# Patient Record
Sex: FEMALE | Race: WHITE | NOT HISPANIC OR LATINO | Employment: OTHER | URBAN - NONMETROPOLITAN AREA
[De-identification: names, ages, dates, MRNs, and addresses within clinical notes are randomized per-mention and may not be internally consistent; named-entity substitution may affect disease eponyms.]

---

## 2017-02-22 ENCOUNTER — AMBULATORY - GICH (OUTPATIENT)
Dept: LAB | Facility: OTHER | Age: 77
End: 2017-02-22

## 2017-02-22 DIAGNOSIS — Z79.899 OTHER LONG TERM (CURRENT) DRUG THERAPY: ICD-10-CM

## 2017-03-02 LAB — Lab: 75 NG/ML

## 2017-10-06 ENCOUNTER — AMBULATORY - GICH (OUTPATIENT)
Dept: LAB | Facility: OTHER | Age: 77
End: 2017-10-06

## 2017-10-06 ENCOUNTER — AMBULATORY - GICH (OUTPATIENT)
Dept: FAMILY MEDICINE | Facility: OTHER | Age: 77
End: 2017-10-06

## 2017-10-06 DIAGNOSIS — Z79.899 OTHER LONG TERM (CURRENT) DRUG THERAPY: ICD-10-CM

## 2017-10-06 DIAGNOSIS — Z23 ENCOUNTER FOR IMMUNIZATION: ICD-10-CM

## 2017-10-11 LAB — Lab: 67 NG/ML

## 2018-01-06 ENCOUNTER — COMMUNICATION - GICH (OUTPATIENT)
Dept: FAMILY MEDICINE | Facility: OTHER | Age: 78
End: 2018-01-06

## 2018-01-06 DIAGNOSIS — E03.9 HYPOTHYROIDISM: ICD-10-CM

## 2018-01-19 ENCOUNTER — COMMUNICATION - GICH (OUTPATIENT)
Dept: FAMILY MEDICINE | Facility: OTHER | Age: 78
End: 2018-01-19

## 2018-01-19 DIAGNOSIS — E78.5 HYPERLIPIDEMIA: ICD-10-CM

## 2018-01-24 ENCOUNTER — DOCUMENTATION ONLY (OUTPATIENT)
Dept: FAMILY MEDICINE | Facility: OTHER | Age: 78
End: 2018-01-24

## 2018-01-24 PROBLEM — E03.9 HYPOTHYROIDISM: Status: ACTIVE | Noted: 2018-01-24

## 2018-01-24 PROBLEM — J30.9 ALLERGIC RHINITIS: Status: ACTIVE | Noted: 2018-01-24

## 2018-01-24 PROBLEM — N95.2 VAGINITIS, ATROPHIC: Status: ACTIVE | Noted: 2018-01-24

## 2018-01-24 PROBLEM — F34.1 DYSTHYMIA: Status: ACTIVE | Noted: 2018-01-24

## 2018-01-24 PROBLEM — M89.9 DISORDER OF BONE AND CARTILAGE: Status: ACTIVE | Noted: 2018-01-24

## 2018-01-24 PROBLEM — M94.9 DISORDER OF BONE AND CARTILAGE: Status: ACTIVE | Noted: 2018-01-24

## 2018-01-24 PROBLEM — N95.1 SYMPTOMATIC MENOPAUSAL OR FEMALE CLIMACTERIC STATES: Status: ACTIVE | Noted: 2018-01-24

## 2018-01-24 PROBLEM — R73.09 ABNORMAL GLUCOSE: Status: ACTIVE | Noted: 2018-01-24

## 2018-01-24 PROBLEM — H91.90 HEARING LOSS: Status: ACTIVE | Noted: 2018-01-24

## 2018-01-24 PROBLEM — H34.9 RETINAL VASCULAR OCCLUSION: Status: ACTIVE | Noted: 2018-01-24

## 2018-01-24 PROBLEM — F32.A DEPRESSION: Status: ACTIVE | Noted: 2018-01-24

## 2018-01-24 RX ORDER — LEVOTHYROXINE SODIUM 75 UG/1
1 TABLET ORAL DAILY
COMMUNITY
Start: 2018-01-08 | End: 2018-05-10

## 2018-01-24 RX ORDER — SIMVASTATIN 10 MG
1 TABLET ORAL DAILY
COMMUNITY
Start: 2016-10-27 | End: 2018-04-23

## 2018-01-24 RX ORDER — NORTRIPTYLINE HCL 25 MG
CAPSULE ORAL
COMMUNITY
End: 2019-03-11

## 2018-01-24 RX ORDER — NORTRIPTYLINE HCL 25 MG
1 CAPSULE ORAL 2 TIMES DAILY
COMMUNITY
Start: 2016-08-20

## 2018-02-12 NOTE — TELEPHONE ENCOUNTER
Patient Information     Patient Name MRN Sex Princess Lizarraga [id number] Female 1940      Telephone Encounter by Mone Joy RN at 2018  2:49 PM     Author:  Mone Joy RN Service:  (none) Author Type:  NURS- Registered Nurse     Filed:  2018  2:52 PM Encounter Date:  2018 Status:  Signed     :  Mone Joy RN (NURS- Registered Nurse)            Hypothyroidism  Office visit in the past 12 months or per provider note.  Last visit with CADEN LEE was on: 10/27/2016 in [facility]  Next visit with CADEN LEE is on: No future appointment listed with this provider  Next visit with Family Practice is on: No future appointment listed in this department    Lab testing requirements:  TSH annually  TSH (uIU/mL)    Date Value   10/31/2016 1.60   Max refill for 12 months from last office visit or per provider note.  Due for exam.  Limited refill per protocol and letter mailed.  Mone Joy RN ........   2018    2:50 PM

## 2018-02-13 NOTE — TELEPHONE ENCOUNTER
Patient Information     Patient Name MRN Princess Glass [id number] Female 1940      Telephone Encounter by Lew Kingsley RN at 2018  3:43 PM     Author:  Lew Kingsley RN Service:  (none) Author Type:  NURS- Registered Nurse     Filed:  2018  3:50 PM Encounter Date:  2018 Status:  Signed     :  Lew Kingsley RN (NURS- Registered Nurse)            Statins    Office visit in the past 12 months.    Last visit with CADEN LEE was on: 10/27/2016 in [facility]  Next visit with CADEN LEE is on: No future appointment listed with this provider  Next visit with Family Practice is on: No future appointment listed in this department    Lab testing requirements:  Lipids annually.  Repeat lipids 6-8 weeks after dosage or drug change.    Last Lipids:  Chol: 160    10/31/2016  T    10/31/2016  HDL:   54    10/31/2016  LDL:  89    10/31/2016  LDL DIRECT:  No results found in past 5 years.    Max refills 12 months from last office visit.      Chart review shows that patient is overdue for an office visit with PCP. Was last seen by PCP on 10/27/16, and was sent a reminder letter/MyChart message with refill encounter dated 18. MyChart message was read by patient on 18, and no appointment is noted to be scheduled at this time. Writer will not send an additional reminder letter/MyChart message at this time, but will refill rx as requested for a limited supply.    Prescription refilled per RN Medication Refill Policy.................... Lew Kingsley RN ....................  2018   3:49 PM

## 2018-02-26 ENCOUNTER — OFFICE VISIT (OUTPATIENT)
Dept: FAMILY MEDICINE | Facility: OTHER | Age: 78
End: 2018-02-26
Attending: FAMILY MEDICINE
Payer: MEDICARE

## 2018-02-26 VITALS
WEIGHT: 171 LBS | SYSTOLIC BLOOD PRESSURE: 126 MMHG | DIASTOLIC BLOOD PRESSURE: 80 MMHG | HEART RATE: 76 BPM | BODY MASS INDEX: 31.47 KG/M2 | HEIGHT: 62 IN

## 2018-02-26 DIAGNOSIS — E55.9 VITAMIN D DEFICIENCY: ICD-10-CM

## 2018-02-26 DIAGNOSIS — Z12.31 ENCOUNTER FOR SCREENING MAMMOGRAM FOR HIGH-RISK PATIENT: ICD-10-CM

## 2018-02-26 DIAGNOSIS — E78.5 HYPERLIPIDEMIA, UNSPECIFIED HYPERLIPIDEMIA TYPE: ICD-10-CM

## 2018-02-26 DIAGNOSIS — Z82.79 FAMILY HISTORY OF BICUSPID AORTIC VALVE: ICD-10-CM

## 2018-02-26 DIAGNOSIS — E03.9 HYPOTHYROIDISM, UNSPECIFIED TYPE: Primary | ICD-10-CM

## 2018-02-26 LAB
ALBUMIN SERPL-MCNC: 4 G/DL (ref 3.5–5.7)
ALP SERPL-CCNC: 64 U/L (ref 34–104)
ALT SERPL W P-5'-P-CCNC: 18 U/L (ref 7–52)
ANION GAP SERPL CALCULATED.3IONS-SCNC: 8 MMOL/L (ref 3–14)
AST SERPL W P-5'-P-CCNC: 20 U/L (ref 13–39)
BILIRUB SERPL-MCNC: 0.5 MG/DL (ref 0.3–1)
BUN SERPL-MCNC: 16 MG/DL (ref 7–25)
CALCIUM SERPL-MCNC: 9.6 MG/DL (ref 8.6–10.3)
CHLORIDE SERPL-SCNC: 105 MMOL/L (ref 98–107)
CHOLEST SERPL-MCNC: 172 MG/DL
CO2 SERPL-SCNC: 28 MMOL/L (ref 21–31)
CREAT SERPL-MCNC: 1.16 MG/DL (ref 0.6–1.2)
DEPRECATED CALCIDIOL+CALCIFEROL SERPL-MC: 35.9 NG/ML
GFR SERPL CREATININE-BSD FRML MDRD: 45 ML/MIN/1.7M2
GLUCOSE SERPL-MCNC: 96 MG/DL (ref 70–105)
HDLC SERPL-MCNC: 51 MG/DL (ref 23–92)
LDLC SERPL CALC-MCNC: 98 MG/DL
NONHDLC SERPL-MCNC: 121 MG/DL
POTASSIUM SERPL-SCNC: 4.4 MMOL/L (ref 3.5–5.1)
PROT SERPL-MCNC: 8.1 G/DL (ref 6.4–8.9)
SODIUM SERPL-SCNC: 141 MMOL/L (ref 134–144)
TRIGL SERPL-MCNC: 115 MG/DL
TSH SERPL DL<=0.05 MIU/L-ACNC: 4.11 IU/ML (ref 0.34–5.6)

## 2018-02-26 PROCEDURE — 36415 COLL VENOUS BLD VENIPUNCTURE: CPT | Performed by: FAMILY MEDICINE

## 2018-02-26 PROCEDURE — 99213 OFFICE O/P EST LOW 20 MIN: CPT | Performed by: FAMILY MEDICINE

## 2018-02-26 PROCEDURE — 80061 LIPID PANEL: CPT | Performed by: FAMILY MEDICINE

## 2018-02-26 PROCEDURE — 84443 ASSAY THYROID STIM HORMONE: CPT | Performed by: FAMILY MEDICINE

## 2018-02-26 PROCEDURE — G0463 HOSPITAL OUTPT CLINIC VISIT: HCPCS

## 2018-02-26 PROCEDURE — 80053 COMPREHEN METABOLIC PANEL: CPT | Performed by: FAMILY MEDICINE

## 2018-02-26 PROCEDURE — 82306 VITAMIN D 25 HYDROXY: CPT | Performed by: FAMILY MEDICINE

## 2018-02-26 RX ORDER — ARIPIPRAZOLE 10 MG/1
5 TABLET ORAL DAILY
Qty: 30 TABLET | Refills: 1 | COMMUNITY
Start: 2018-02-26

## 2018-02-26 ASSESSMENT — ENCOUNTER SYMPTOMS
SHORTNESS OF BREATH: 0
FEVER: 0
UNEXPECTED WEIGHT CHANGE: 0
FATIGUE: 0

## 2018-02-26 NOTE — PROGRESS NOTES
SUBJECTIVE:   Princess Leone is a 77 year old female who presents to clinic today for the following health issues:    HPI   Her son had an operative to replace his aortic valve due to it being bicuspid.  Her son's son also has a bicuspid aortic valve, but hasn't had his replaced at all.  She had an echo in 2011 and was noted to have a trileaflet aortic valve.    She had been taking Vitamin D 5000 International Units daily for a while.  Her last supplement she picked up was a smaller dose (?1000 International Units).  She would like to have her level rechecked.    Patient Active Problem List    Diagnosis Date Noted     Allergic rhinitis 01/24/2018     Priority: Medium     Retinal vascular occlusion 01/24/2018     Priority: Medium     Overview:   Left eye subtotal central retinal vein occlusion.       Depression 01/24/2018     Priority: Medium     Dysthymia 01/24/2018     Priority: Medium     Hearing loss 01/24/2018     Priority: Medium     Overview:   Mild       Hypothyroidism 01/24/2018     Priority: Medium     Symptomatic menopausal or female climacteric states 01/24/2018     Priority: Medium     Disorder of bone and cartilage 01/24/2018     Priority: Medium     Abnormal glucose 01/24/2018     Priority: Medium     Vaginitis, atrophic 01/24/2018     Priority: Medium     CKD (chronic kidney disease) 11/02/2016     Priority: Medium     Vitamin D deficiency 03/19/2015     Priority: Medium     Cerebral lesion 05/17/2012     Priority: Medium     Intermittent atrial fibrillation (H) 05/17/2012     Priority: Medium     Other nonspecific abnormal result of function study of brain and central nervous system 04/24/2012     Priority: Medium     Cerebral artery occlusion with cerebral infarction (H) 04/06/2012     Priority: Medium     Viral upper respiratory tract infection 01/20/2012     Priority: Medium     Abnormal electrocardiogram 06/09/2011     Priority: Medium     Hyperlipidemia 05/03/2010     Priority: Medium      Carpal tunnel syndrome 2003     Priority: Medium     Past Medical History:   Diagnosis Date     Cerebral infarction (H)     12,left parietal CVA with expressive aphasia.  MRI of brain after head CT showed multiple enhancing lesions, concerning for metastatic illness.  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  Referred to ENT and oncology 12.     Hypothyroidism          Personal history of other medical treatment (CODE)     G3, P3-0-0-3 status post three NSVDs      Past Surgical History:   Procedure Laterality Date     COLONOSCOPY      approximately  in the [facility] - was normal.     OTHER SURGICAL HISTORY      .0,(IA) SURGICAL PROCEDURE,N/A     OTHER SURGICAL HISTORY      01782.0,PAST SURGICAL HISTORY,None.     Family History   Problem Relation Age of Onset     HEART DISEASE Mother      Heart Disease,D & C at 91/Heart Disease,pacemaker     Hypertension Mother      Hypertension     Other - See Comments Father       at 76.  He had peripheral vascular disease. Bypass./COPD     HEART DISEASE Brother      Heart Disease, carotid disease and has gone through stenting     Other - See Comments Brother      tinnitis     HEART DISEASE Brother      Heart Disease,CABG x 3     Breast Cancer No family hx of      Cancer-breast     Social History   Substance Use Topics     Smoking status: Never Smoker     Smokeless tobacco: Never Used     Alcohol use No     Social History     Social History Narrative    Retired from  teaching    3 grown kids and 5 grandkids    L[facility] with  in [facility]    Cafene 1    Calcium good    Water some    She is retired.  She and her  relocated to the [facility] from [place], [place].      Kirk Son, 4/10/64      Kyle    tiffanie Daughter, 67      Karen Daughter, 3/9/69       Brian -   "    Current Outpatient Prescriptions   Medication Sig Dispense Refill     ARIPiprazole (ABILIFY) 10 MG tablet Take 0.5 tablets (5 mg) by mouth daily 30 tablet 1     Aspirin Buf,CaCarb-MgCarb-MgO, (BUFFERED ASPIRIN) 325 MG TABS Take 1 tablet by mouth daily       cholecalciferol (D 5000) 5000 UNITS CAPS Take one by mouth daily.  Have lab level of vitamin d rechecked after completion of 90 days.       levothyroxine (SYNTHROID/LEVOTHROID) 75 MCG tablet Take 1 tablet by mouth daily       nortriptyline (PAMELOR) 25 MG capsule Take 1 capsule by mouth 2 times daily       nortriptyline (PAMELOR) 25 MG capsule        simvastatin (ZOCOR) 10 MG tablet Take 1 tablet by mouth daily       No Known Allergies    Review of Systems   Constitutional: Negative for fatigue, fever and unexpected weight change.   Respiratory: Negative for shortness of breath.    Cardiovascular: Negative for chest pain.   Psychiatric/Behavioral: Negative for mood changes.        OBJECTIVE:     /80 (BP Location: Right arm, Patient Position: Sitting, Cuff Size: Adult Large)  Pulse 76  Ht 5' 2\" (1.575 m)  Wt 171 lb (77.6 kg)  BMI 31.28 kg/m2  Body mass index is 31.28 kg/(m^2).  Physical Exam   Constitutional: She appears well-developed.   HENT:   Head: Normocephalic.   Eyes: Pupils are equal, round, and reactive to light.   Neck: Normal range of motion. Neck supple. No thyromegaly present.   Cardiovascular: Normal rate, regular rhythm and normal heart sounds.    No murmur heard.  Pulmonary/Chest: Effort normal and breath sounds normal. No respiratory distress. She has no wheezes. She has no rales.   Musculoskeletal: She exhibits no edema.   Lymphadenopathy:     She has no cervical adenopathy.   Psychiatric: She has a normal mood and affect.       PHQ-9 SCORE 3/19/2015 10/27/2016 2/26/2018   Total Score 0 0 0           Diagnostic Test Results:  Results for orders placed or performed in visit on 02/26/18 (from the past 24 hour(s))   Vitamin D Total GH "   Result Value Ref Range    Vitamin D Total 35.9 ng/mL   Lipid Profile (Chol, Trig, HDL, LDL calc) - NON FASTING   Result Value Ref Range    Cholesterol 172 <200 mg/dL    Triglycerides 115 <150 mg/dL    HDL Cholesterol 51 23 - 92 mg/dL    LDL Cholesterol Calculated 98 <100 mg/dL    Non HDL Cholesterol 121 <130 mg/dL   Comprehensive metabolic panel   Result Value Ref Range    Sodium 141 134 - 144 mmol/L    Potassium 4.4 3.5 - 5.1 mmol/L    Chloride 105 98 - 107 mmol/L    Carbon Dioxide 28 21 - 31 mmol/L    Anion Gap 8 3 - 14 mmol/L    Glucose 96 70 - 105 mg/dL    Urea Nitrogen 16 7 - 25 mg/dL    Creatinine 1.16 0.60 - 1.20 mg/dL    GFR Estimate 45 (L) >60 mL/min/1.7m2    GFR Estimate If Black 55 (L) >60 mL/min/1.7m2    Calcium 9.6 8.6 - 10.3 mg/dL    Bilirubin Total 0.5 0.3 - 1.0 mg/dL    Albumin 4.0 3.5 - 5.7 g/dL    Protein Total 8.1 6.4 - 8.9 g/dL    Alkaline Phosphatase 64 34 - 104 U/L    ALT 18 7 - 52 U/L    AST 20 13 - 39 U/L   TSH GH   Result Value Ref Range    Thyrotropin 4.11 0.34 - 5.60 IU/mL       ASSESSMENT/PLAN:       ICD-10-CM    1. Hypothyroidism, unspecified type E03.9 TSH GH   2. Vitamin D deficiency E55.9 Vitamin D Total GH   3. Hyperlipidemia, unspecified hyperlipidemia type E78.5 Lipid Profile (Chol, Trig, HDL, LDL calc) - NON FASTING     Comprehensive metabolic panel   4. Encounter for screening mammogram for high-risk patient Z12.31 MA Screening Digital Bilateral     1.  TSH today was normal as noted above.  She continues on levothyroxine.  2.  Recheck of vitamin D was in normal range.  3.  Nonfasting lipid profile today was normal.  She continues on simvastatin.  4.  Screening mammogram ordered.    Linda Moreno MD  [facility]

## 2018-02-26 NOTE — PATIENT INSTRUCTIONS
You had an echocardiogram in 2011.  They noted that your aortic valve had the normal 3 leaflets (not 2 like your son).

## 2018-02-26 NOTE — LETTER
February 26, 2018      Princess eLone  [address] [address]        Dear Princess,     Your labs showed that your vitamin D level was in good range.  Your lipids also were normal.  Your Comprehensive Metabolic Profile showed a mild decrease in your GFR, which reflects a mild decrease in your renal function.  This is stable.  Your TSH (thyroid) was normal.  If you have questions, please call [phone].      Sincerely,        Linda Moreno MD      Resulted Orders   Vitamin D Total GH   Result Value Ref Range    Vitamin D Total 35.9 ng/mL      Comment:      Comments:  Deficiency:             <10 ng/mL  Insufficiency:        10-29 ng/mL  Sufficiency:          ng/mL  Possible Toxicity:     >100 ng/mL     Lipid Profile (Chol, Trig, HDL, LDL calc) - NON FASTING   Result Value Ref Range    Cholesterol 172 <200 mg/dL    Triglycerides 115 <150 mg/dL    HDL Cholesterol 51 23 - 92 mg/dL    LDL Cholesterol Calculated 98 <100 mg/dL      Comment:      Desirable:       <100 mg/dl    Non HDL Cholesterol 121 <130 mg/dL   Comprehensive metabolic panel   Result Value Ref Range    Sodium 141 134 - 144 mmol/L    Potassium 4.4 3.5 - 5.1 mmol/L    Chloride 105 98 - 107 mmol/L    Carbon Dioxide 28 21 - 31 mmol/L    Anion Gap 8 3 - 14 mmol/L    Glucose 96 70 - 105 mg/dL    Urea Nitrogen 16 7 - 25 mg/dL    Creatinine 1.16 0.60 - 1.20 mg/dL    GFR Estimate 45 (L) >60 mL/min/1.7m2    GFR Estimate If Black 55 (L) >60 mL/min/1.7m2    Calcium 9.6 8.6 - 10.3 mg/dL    Bilirubin Total 0.5 0.3 - 1.0 mg/dL    Albumin 4.0 3.5 - 5.7 g/dL    Protein Total 8.1 6.4 - 8.9 g/dL    Alkaline Phosphatase 64 34 - 104 U/L    ALT 18 7 - 52 U/L    AST 20 13 - 39 U/L   TSH GH   Result Value Ref Range    Thyrotropin 4.11 0.34 - 5.60 IU/mL

## 2018-02-26 NOTE — MR AVS SNAPSHOT
"              After Visit Summary   2/26/2018    Princess Leone    MRN: [id number]           Patient Information     Date Of Birth          1940        Visit Information        Provider Department      2/26/2018 1:15 PM Linda Moreno MD [facility]        Today's Diagnoses     Hypothyroidism, unspecified type    -  1    Vitamin D deficiency        Hyperlipidemia, unspecified hyperlipidemia type        Encounter for screening mammogram for high-risk patient          Care Instructions    You had an echocardiogram in 2011.  They noted that your aortic valve had the normal 3 leaflets (not 2 like your son).          Follow-ups after your visit        Future tests that were ordered for you today     Open Future Orders        Priority Expected Expires Ordered    MA Screening Digital Bilateral Routine  2/26/2019 2/26/2018            Who to contact     If you have questions or need follow up information about today's clinic visit or your schedule please contact [facility] [place] [facility] directly at [phone].  Normal or non-critical lab and imaging results will be communicated to you by TV TubeXhart, letter or phone within 4 business days after the clinic has received the results. If you do not hear from us within 7 days, please contact the clinic through TV TubeXhart or phone. If you have a critical or abnormal lab result, we will notify you by phone as soon as possible.  Submit refill requests through Arch[employer] or call your pharmacy and they will forward the refill request to us. Please allow 3 business days for your refill to be completed.          Additional Information About Your Visit        MyChart Information     Archy lets you send messages to your doctor, view your test results, renew your prescriptions, schedule appointments and more. To sign up, go to [email or web address] . Click on \"Log in\" on the left side of the screen, which will take you to the Welcome page. Then " "click on \"Sign up Now\" on the right side of the page.     You will be asked to enter the access code listed below, as well as some personal information. Please follow the directions to create your username and password.     Your access code is: [email or web address]  Expires: 2018  2:17 PM     Your access code will  in 90 days. If you need help or a new code, please call your [facility] clinic or [phone].        Care EveryWhere ID     This is your Care EveryWhere ID. This could be used by other organizations to access your [facility] medical records  [id number]        Your Vitals Were     Pulse Height BMI (Body Mass Index)             76 5' 2\" (1.575 m) 31.28 kg/m2          Blood Pressure from Last 3 Encounters:   18 126/80   10/27/16 128/64   16 142/80    Weight from Last 3 Encounters:   18 171 lb (77.6 kg)   10/27/16 170 lb (77.1 kg)   16 165 lb (74.8 kg)              We Performed the Following     Comprehensive metabolic panel     Lipid Profile (Chol, Trig, HDL, LDL calc) - NON FASTING     TSH GH     Vitamin D Total GH        Primary Care Provider Office Phone # Fax #    Linda Juliette Moreno [phone] [address] [facility] [address]        Equal Access to Services     [facility]: Hadii alba tubbs hadasho Sowalter, waaxda luqadaha, qaybta kaalmada danielle, carol ann russo . So [facility] [phone].    ATENCIÓN: Si habla español, tiene a frazier disposición servicios gratuitos de asistencia lingüística. Llame al [phone].    We comply with applicable federal civil rights laws and [place] laws. We do not discriminate on the basis of race, color, national origin, age, disability, sex, sexual orientation, or gender identity.            Thank you!     Thank you for choosing [facility] AND hospitals  for your care. Our goal is always to provide you with excellent care. Hearing back from our patients is one way we can " continue to improve our services. Please take a few minutes to complete the written survey that you may receive in the mail after your visit with us. Thank you!             Your Updated Medication List - Protect others around you: Learn how to safely use, store and throw away your medicines at www.disposemymeds.org.          This list is accurate as of 2/26/18  2:17 PM.  Always use your most recent med list.                   Brand Name Dispense Instructions for use Diagnosis    ARIPiprazole 10 MG tablet    ABILIFY    30 tablet    Take 0.5 tablets (5 mg) by mouth daily        Buffered Aspirin 325 MG Tabs      Take 1 tablet by mouth daily        D 5000 5000 UNITS Caps   Generic drug:  cholecalciferol      Take one by mouth daily.  Have lab level of vitamin d rechecked after completion of 90 days.        levothyroxine 75 MCG tablet    SYNTHROID/LEVOTHROID     Take 1 tablet by mouth daily        * nortriptyline 25 MG capsule    PAMELOR          * nortriptyline 25 MG capsule    PAMELOR     Take 1 capsule by mouth 2 times daily        simvastatin 10 MG tablet    ZOCOR     Take 1 tablet by mouth daily        * Notice:  This list has 2 medication(s) that are the same as other medications prescribed for you. Read the directions carefully, and ask your doctor or other care provider to review them with you.

## 2018-02-27 ASSESSMENT — PATIENT HEALTH QUESTIONNAIRE - PHQ9: SUM OF ALL RESPONSES TO PHQ QUESTIONS 1-9: 0

## 2018-03-19 ENCOUNTER — HOSPITAL ENCOUNTER (OUTPATIENT)
Dept: MAMMOGRAPHY | Facility: OTHER | Age: 78
Discharge: HOME | End: 2018-03-19
Attending: FAMILY MEDICINE | Admitting: FAMILY MEDICINE
Payer: MEDICARE

## 2018-03-19 DIAGNOSIS — Z12.31 ENCOUNTER FOR SCREENING MAMMOGRAM FOR HIGH-RISK PATIENT: ICD-10-CM

## 2018-03-19 PROCEDURE — 77067 SCR MAMMO BI INCL CAD: CPT

## 2018-04-23 DIAGNOSIS — E78.5 HYPERLIPIDEMIA, UNSPECIFIED HYPERLIPIDEMIA TYPE: Primary | ICD-10-CM

## 2018-04-25 RX ORDER — SIMVASTATIN 10 MG
TABLET ORAL
Qty: 90 TABLET | Refills: 2 | Status: SHIPPED | OUTPATIENT
Start: 2018-04-25 | End: 2019-02-01

## 2018-04-25 NOTE — TELEPHONE ENCOUNTER
Zocor 10 mg  LOV and labs 02/26/2018  Prescription refilled per RN Medication RefillPolicy.................... Mone Joy ....................  4/25/2018   3:55 PM

## 2018-05-10 DIAGNOSIS — E03.9 HYPOTHYROIDISM, UNSPECIFIED TYPE: Primary | ICD-10-CM

## 2018-05-11 RX ORDER — LEVOTHYROXINE SODIUM 75 UG/1
TABLET ORAL
Qty: 90 TABLET | Refills: 2 | Status: SHIPPED | OUTPATIENT
Start: 2018-05-11 | End: 2019-03-11

## 2018-05-11 NOTE — TELEPHONE ENCOUNTER
Synthroid  LOV and TSH- 02/26/2018  Prescription refilled per RN Medication RefillPolkierany.................... Mone Joy ....................  5/11/2018   11:14 AM

## 2018-07-02 ENCOUNTER — TRANSFERRED RECORDS (OUTPATIENT)
Dept: HEALTH INFORMATION MANAGEMENT | Facility: OTHER | Age: 78
End: 2018-07-02

## 2018-07-24 NOTE — PROGRESS NOTES
Patient Information     Patient Name  Princess Leone MRN  [id number] Sex  Female   1940      Letter by Linda Moreno MD at      Author:  Linda Moreno MD Service:  (none) Author Type:  (none)    Filed:   Encounter Date:  2018 Status:  (Other)           Princess Leone  [address] [place] [facility] [address]          2018    Dear Ms. Leone:    A 90 day refill of Levothyroxine (SYNTHROID) 75 mcg tablet has been called into your pharmacy.    Additional refills require annual labs and an office visit with Linda Moreno MD.   Please call the clinic at [phone] to schedule your appointment.    If you should require additional refills before your scheduled appointment, please contact your pharmacy and we will refill your medication until that date.      Thank you,    The Refill Nurse  [facility]

## 2018-09-13 ENCOUNTER — MEDICAL CORRESPONDENCE (OUTPATIENT)
Dept: LAB | Facility: OTHER | Age: 78
End: 2018-09-13

## 2018-10-10 ENCOUNTER — ALLIED HEALTH/NURSE VISIT (OUTPATIENT)
Dept: FAMILY MEDICINE | Facility: OTHER | Age: 78
End: 2018-10-10
Attending: FAMILY MEDICINE
Payer: MEDICARE

## 2018-10-10 DIAGNOSIS — Z79.899 NEED FOR PROPHYLACTIC CHEMOTHERAPY: Primary | ICD-10-CM

## 2018-10-10 DIAGNOSIS — Z23 NEED FOR PROPHYLACTIC VACCINATION AND INOCULATION AGAINST INFLUENZA: Primary | ICD-10-CM

## 2018-10-10 PROCEDURE — 90662 IIV NO PRSV INCREASED AG IM: CPT

## 2018-10-10 PROCEDURE — 80335 ANTIDEPRESSANT TRICYCLIC 1/2: CPT

## 2018-10-10 PROCEDURE — 36415 COLL VENOUS BLD VENIPUNCTURE: CPT

## 2018-10-10 PROCEDURE — G0008 ADMIN INFLUENZA VIRUS VAC: HCPCS

## 2018-10-10 NOTE — PROGRESS NOTES

## 2018-10-10 NOTE — MR AVS SNAPSHOT
"              After Visit Summary   10/10/2018    Princess Leone    MRN: [id number]           Patient Information     Date Of Birth          1940        Visit Information        Provider Department      10/10/2018 2:00 PM GH FLU [facility]        Today's Diagnoses     Need for prophylactic vaccination and inoculation against influenza    -  1       Follow-ups after your visit        Who to contact     If you have questions or need follow up information about today's clinic visit or your schedule please contact [facility] directly at [phone].  Normal or non-critical lab and imaging results will be communicated to you by Head Held Highhart, letter or phone within 4 business days after the clinic has received the results. If you do not hear from us within 7 days, please contact the clinic through [employer] or phone. If you have a critical or abnormal lab result, we will notify you by phone as soon as possible.  Submit refill requests through [employer] or call your pharmacy and they will forward the refill request to us. Please allow 3 business days for your refill to be completed.          Additional Information About Your Visit        MyChart Information     [employer] lets you send messages to your doctor, view your test results, renew your prescriptions, schedule appointments and more. To sign up, go to [email or web address] . Click on \"Log in\" on the left side of the screen, which will take you to the Welcome page. Then click on \"Sign up Now\" on the right side of the page.     You will be asked to enter the access code listed below, as well as some personal information. Please follow the directions to create your username and password.     Your access code is: [email or web address]  Expires: 2019  3:23 PM     Your access code will  in 90 days. If you need help or a new code, please call your [facility] clinic or [phone].        Care EveryWhere ID     This is your Care " EveryWhere ID. This could be used by other organizations to access your [facility] medical records  [id number]         Blood Pressure from Last 3 Encounters:   02/26/18 126/80   10/27/16 128/64   04/08/16 142/80    Weight from Last 3 Encounters:   02/26/18 171 lb (77.6 kg)   10/27/16 170 lb (77.1 kg)   04/08/16 165 lb (74.8 kg)              We Performed the Following     HC FLU VACCINE, INCREASED ANTIGEN, PRESV FREE        Primary Care Provider Office Phone # Fax #    Linda Juliette Moreno [phone]       [address] [facility] [address]        Equal Access to Services     [facility]AL[facility] : Hadii alba Wooten, waaxda luvasuadaha, qaybta kaalmada adeyawyaleana, carol ann russo . So [facility] [phone].    ATENCIÓN: Si habla español, tiene a frazier disposición servicios gratuitos de asistencia lingüística. [facility] [phone].    We comply with applicable federal civil rights laws and [place] laws. We do not discriminate on the basis of race, color, national origin, age, disability, sex, sexual orientation, or gender identity.            Thank you!     Thank you for choosing [facility]  for your care. Our goal is always to provide you with excellent care. Hearing back from our patients is one way we can continue to improve our services. Please take a few minutes to complete the written survey that you may receive in the mail after your visit with us. Thank you!             Your Updated Medication List - Protect others around you: Learn how to safely use, store and throw away your medicines at www.disposemymeds.org.          This list is accurate as of 10/10/18  3:23 PM.  Always use your most recent med list.                   Brand Name Dispense Instructions for use Diagnosis    ARIPiprazole 10 MG tablet    ABILIFY    30 tablet    Take 0.5 tablets (5 mg) by mouth daily        Buffered Aspirin 325 MG Tabs      Take 1 tablet by mouth daily         D 5000 5000 units Caps   Generic drug:  cholecalciferol      Take one by mouth daily.  Have lab level of vitamin d rechecked after completion of 90 days.        levothyroxine 75 MCG tablet    SYNTHROID/LEVOTHROID    90 tablet    TAKE ONE TABLET BY MOUTH ONCE DAILY    Hypothyroidism, unspecified type       * nortriptyline 25 MG capsule    PAMELOR          * nortriptyline 25 MG capsule    PAMELOR     Take 1 capsule by mouth 2 times daily        simvastatin 10 MG tablet    ZOCOR    90 tablet    TAKE ONE TABLET BY MOUTH ONCE DAILY    Hyperlipidemia, unspecified hyperlipidemia type       * Notice:  This list has 2 medication(s) that are the same as other medications prescribed for you. Read the directions carefully, and ask your doctor or other care provider to review them with you.

## 2018-10-15 LAB — NORTRIP SERPL-MCNC: 77 NG/ML

## 2019-01-21 ENCOUNTER — DOCUMENTATION ONLY (OUTPATIENT)
Dept: OTHER | Facility: CLINIC | Age: 79
End: 2019-01-21

## 2019-02-01 DIAGNOSIS — E78.5 HYPERLIPIDEMIA, UNSPECIFIED HYPERLIPIDEMIA TYPE: ICD-10-CM

## 2019-02-01 RX ORDER — SIMVASTATIN 10 MG
TABLET ORAL
Qty: 90 TABLET | Refills: 0 | Status: SHIPPED | OUTPATIENT
Start: 2019-02-01 | End: 2019-03-11

## 2019-02-01 NOTE — TELEPHONE ENCOUNTER
"Requested Prescriptions   Pending Prescriptions Disp Refills     simvastatin (ZOCOR) 10 MG tablet [Pharmacy Med Name: SIMVASTATIN 10MG    TAB] 90 tablet 2     Sig: TAKE 1 TABLET BY MOUTH ONCE DAILY    Statins Protocol Passed - 2/1/2019 11:25 AM       Passed - LDL on file in past 12 months    Recent Labs   Lab Test 02/26/18  1424   LDL 98            Passed - No abnormal creatine kinase in past 12 months    No lab results found.            Passed - Recent (12 mo) or future (30 days) visit within the authorizing provider's specialty    Patient had office visit in the last 12 months or has a visit in the next 30 days with authorizing provider or within the authorizing provider's specialty.  See \"Patient Info\" tab in inbasket, or \"Choose Columns\" in Meds & Orders section of the refill encounter.             Passed - Medication is active on med list       Passed - Patient is age 18 or older       Passed - No active pregnancy on record       Passed - No positive pregnancy test in past 12 months        LOV-02/26/2018    Due for exam.  Limited refill per protocol and letter mailed.  Mone Joy ........   2/1/2019    11:28 AM      "

## 2019-02-01 NOTE — LETTER
February 1, 2019      Princess Leone  [address] [place] [address]      Dear Princess,     This is to remind you that you are coming due for your annual office visit.  Your last visit was on 02/26/2018.     Additional refills of your medication require you to complete this visit.    Please call [phone] to schedule your appointment.    Thank you for choosing [facility] and [facility] for your health care needs.    Sincerely,      Refill RN  [facility]

## 2019-02-12 ENCOUNTER — TRANSFERRED RECORDS (OUTPATIENT)
Dept: HEALTH INFORMATION MANAGEMENT | Facility: OTHER | Age: 79
End: 2019-02-12

## 2019-03-11 ENCOUNTER — OFFICE VISIT (OUTPATIENT)
Dept: FAMILY MEDICINE | Facility: OTHER | Age: 79
End: 2019-03-11
Attending: FAMILY MEDICINE
Payer: COMMERCIAL

## 2019-03-11 VITALS
HEART RATE: 88 BPM | DIASTOLIC BLOOD PRESSURE: 80 MMHG | WEIGHT: 180 LBS | HEIGHT: 63 IN | SYSTOLIC BLOOD PRESSURE: 130 MMHG | TEMPERATURE: 97.8 F | BODY MASS INDEX: 31.89 KG/M2 | RESPIRATION RATE: 22 BRPM

## 2019-03-11 DIAGNOSIS — Z23 NEED FOR SHINGLES VACCINE: ICD-10-CM

## 2019-03-11 DIAGNOSIS — E03.9 HYPOTHYROIDISM, UNSPECIFIED TYPE: Primary | ICD-10-CM

## 2019-03-11 DIAGNOSIS — Z13.6 SCREENING FOR AAA (ABDOMINAL AORTIC ANEURYSM): ICD-10-CM

## 2019-03-11 DIAGNOSIS — Z00.00 HEALTH CARE MAINTENANCE: ICD-10-CM

## 2019-03-11 DIAGNOSIS — E78.5 HYPERLIPIDEMIA, UNSPECIFIED HYPERLIPIDEMIA TYPE: ICD-10-CM

## 2019-03-11 DIAGNOSIS — Z78.0 POSTMENOPAUSAL STATUS: ICD-10-CM

## 2019-03-11 DIAGNOSIS — F32.A DEPRESSION, UNSPECIFIED DEPRESSION TYPE: ICD-10-CM

## 2019-03-11 DIAGNOSIS — Z13.0 SCREENING, ANEMIA, DEFICIENCY, IRON: ICD-10-CM

## 2019-03-11 PROCEDURE — G0463 HOSPITAL OUTPT CLINIC VISIT: HCPCS

## 2019-03-11 PROCEDURE — 99214 OFFICE O/P EST MOD 30 MIN: CPT | Performed by: FAMILY MEDICINE

## 2019-03-11 RX ORDER — ARIPIPRAZOLE 10 MG/1
5 TABLET ORAL DAILY
Qty: 45 TABLET | Refills: 3 | Status: CANCELLED | OUTPATIENT
Start: 2019-03-11

## 2019-03-11 RX ORDER — LEVOTHYROXINE SODIUM 75 UG/1
75 TABLET ORAL DAILY
Qty: 90 TABLET | Refills: 3 | Status: SHIPPED | OUTPATIENT
Start: 2019-03-11 | End: 2019-03-25

## 2019-03-11 RX ORDER — SIMVASTATIN 10 MG
10 TABLET ORAL DAILY
Qty: 90 TABLET | Refills: 3 | Status: SHIPPED | OUTPATIENT
Start: 2019-03-11 | End: 2020-06-09

## 2019-03-11 RX ORDER — NORTRIPTYLINE HCL 25 MG
25 CAPSULE ORAL 2 TIMES DAILY
Qty: 180 CAPSULE | Refills: 3 | Status: CANCELLED | OUTPATIENT
Start: 2019-03-11

## 2019-03-11 ASSESSMENT — ENCOUNTER SYMPTOMS
COUGH: 0
NERVOUS/ANXIOUS: 0
UNEXPECTED WEIGHT CHANGE: 0
FATIGUE: 0

## 2019-03-11 ASSESSMENT — PAIN SCALES - GENERAL: PAINLEVEL: NO PAIN (0)

## 2019-03-11 ASSESSMENT — PATIENT HEALTH QUESTIONNAIRE - PHQ9: SUM OF ALL RESPONSES TO PHQ QUESTIONS 1-9: 0

## 2019-03-11 ASSESSMENT — MIFFLIN-ST. JEOR: SCORE: 1265.6

## 2019-03-11 NOTE — PROGRESS NOTES
SUBJECTIVE:   There are no exam notes on file for this visit.    Princess Leone is a 78 year old female who presents to clinic today for refill of medications.  She has been doing well.  Her depression has been well controlled on Abilify and Nortriptyline.  She continues to follow with Dr. Cooper every 6 months.      HPI    I personally reviewed medications/allergies/history listed below:    Patient Active Problem List    Diagnosis Date Noted     Allergic rhinitis 01/24/2018     Priority: Medium     Retinal vascular occlusion 01/24/2018     Priority: Medium     Overview:   Left eye subtotal central retinal vein occlusion.       Depression 01/24/2018     Priority: Medium     Dysthymia 01/24/2018     Priority: Medium     Hearing loss 01/24/2018     Priority: Medium     Overview:   Mild       Hypothyroidism 01/24/2018     Priority: Medium     Symptomatic menopausal or female climacteric states 01/24/2018     Priority: Medium     Disorder of bone and cartilage 01/24/2018     Priority: Medium     Abnormal glucose 01/24/2018     Priority: Medium     Vaginitis, atrophic 01/24/2018     Priority: Medium     CKD (chronic kidney disease) 11/02/2016     Priority: Medium     Vitamin D deficiency 03/19/2015     Priority: Medium     Cerebral lesion 05/17/2012     Priority: Medium     Intermittent atrial fibrillation (H) 05/17/2012     Priority: Medium     Other nonspecific abnormal result of function study of brain and central nervous system 04/24/2012     Priority: Medium     Cerebral artery occlusion with cerebral infarction (H) 04/06/2012     Priority: Medium     Viral upper respiratory tract infection 01/20/2012     Priority: Medium     Abnormal electrocardiogram 06/09/2011     Priority: Medium     Hyperlipidemia 05/03/2010     Priority: Medium     Carpal tunnel syndrome 11/24/2003     Priority: Medium     Past Medical History:   Diagnosis Date     Cerebral infarction (H)     4/5/12,left parietal CVA with expressive  aphasia.  MRI of brain after head CT showed multiple enhancing lesions, concerning for metastatic illness.  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  Referred to ENT and oncology 12.     Hypothyroidism          Personal history of other medical treatment (CODE)     G3, P3-0-0-3 status post three NSVDs      Past Surgical History:   Procedure Laterality Date     COLONOSCOPY      approximately  in the [facility] - was normal.     OTHER SURGICAL HISTORY      .0,(IA) SURGICAL PROCEDURE,N/A     OTHER SURGICAL HISTORY      63075.0,PAST SURGICAL HISTORY,None.     Family History   Problem Relation Age of Onset     Heart Disease Mother         Heart Disease,D & C at 91/Heart Disease,pacemaker     Hypertension Mother         Hypertension     Other - See Comments Father          at 76.  He had peripheral vascular disease. Bypass./COPD     Heart Disease Brother         Heart Disease, carotid disease and has gone through stenting     Other - See Comments Brother         tinnitis     Heart Disease Brother         Heart Disease,CABG x 3     Heart Surgery Son         Aortic Valve replacement due to bicuspid aortic valve.     Breast Cancer No family hx of         Cancer-breast     Social History     Tobacco Use     Smoking status: Never Smoker     Smokeless tobacco: Never Used   Substance Use Topics     Alcohol use: No     Social History     Social History Narrative    Retired from  teaching    3 grown kids and 5 grandkids    Lives with  in [facility]    Calcium good    Water some    She is retired.  She and her  relocated to the [facility] from [place], [place].      Kirk Son, 4/10/64      Kyle    tiffanie Daughter, 67      Karen Daughter, 3/9/69       Brian -      Current Outpatient Medications   Medication Sig  "Dispense Refill     ARIPiprazole (ABILIFY) 10 MG tablet Take 0.5 tablets (5 mg) by mouth daily 30 tablet 1     Aspirin Buf,CaCarb-MgCarb-MgO, (BUFFERED ASPIRIN) 325 MG TABS Take 1 tablet by mouth daily       cholecalciferol (D 5000) 5000 UNITS CAPS Take one by mouth daily.  Have lab level of vitamin d rechecked after completion of 90 days.       levothyroxine (SYNTHROID/LEVOTHROID) 75 MCG tablet Take 1 tablet (75 mcg) by mouth daily 90 tablet 3     nortriptyline (PAMELOR) 25 MG capsule Take 1 capsule by mouth 2 times daily       other medical supplies Shingrix.  Diagnosis:  Z23. 1 each 1     simvastatin (ZOCOR) 10 MG tablet Take 1 tablet (10 mg) by mouth daily 90 tablet 3     No Known Allergies    Review of Systems   Constitutional: Negative for fatigue and unexpected weight change.   Respiratory: Negative for cough.    Cardiovascular: Negative for peripheral edema.   Psychiatric/Behavioral: Negative for mood changes. The patient is not nervous/anxious.         OBJECTIVE:     /80 (BP Location: Right arm, Patient Position: Sitting, Cuff Size: Adult Large)   Pulse 88   Temp 97.8  F (36.6  C) (Tympanic)   Resp 22   Ht 1.6 m (5' 3\")   Wt 81.6 kg (180 lb)   BMI 31.89 kg/m    Body mass index is 31.89 kg/m .  Physical Exam   Constitutional: She is oriented to person, place, and time. She appears well-developed and well-nourished. No distress.   HENT:   Head: Normocephalic.   Right Ear: Tympanic membrane and external ear normal.   Left Ear: Tympanic membrane and external ear normal.   Nose: Nose normal.   Mouth/Throat: Oropharynx is clear and moist. No oropharyngeal exudate.   Eyes: Conjunctivae are normal. Pupils are equal, round, and reactive to light. Right eye exhibits no discharge. Left eye exhibits no discharge.   Neck: Neck supple. No tracheal deviation present. No thyromegaly present.   Cardiovascular: Normal rate, regular rhythm, S1 normal, S2 normal, normal heart sounds, intact distal pulses and normal " pulses. Exam reveals no gallop, no S3, no S4 and no friction rub.   No murmur heard.  Pulmonary/Chest: Effort normal and breath sounds normal. No respiratory distress. She has no wheezes. She has no rales.   Breast exam:  Declined per patient.   Abdominal: Soft. Bowel sounds are normal. She exhibits no distension and no mass. There is no hepatosplenomegaly. There is no tenderness.   Genitourinary: No breast tenderness or discharge.   Genitourinary Comments: Pelvic/Rectal exams deferred per patient.   Musculoskeletal: Normal range of motion. She exhibits no edema.   Lymphadenopathy:     She has no cervical adenopathy.   Neurological: She is alert and oriented to person, place, and time. She has normal strength and normal reflexes. She exhibits normal muscle tone.   Skin: Skin is warm and dry. No rash noted.   Psychiatric: She has a normal mood and affect. Judgment and thought content normal. Cognition and memory are normal.         PHQ-9 SCORE 10/27/2016 2/26/2018 3/11/2019   PHQ-9 Total Score 0 0 0       I personally reviewed results withpatient as listed below:   Diagnostic Test Results:  none     ASSESSMENT/PLAN:       ICD-10-CM    1. Hypothyroidism, unspecified type E03.9 levothyroxine (SYNTHROID/LEVOTHROID) 75 MCG tablet     Thyrotropin GH   2. Hyperlipidemia, unspecified hyperlipidemia type E78.5 simvastatin (ZOCOR) 10 MG tablet     Comprehensive Metabolic Panel     Lipid Panel   3. Depression, unspecified depression type F32.9    4. Screening, anemia, deficiency, iron Z13.0 CBC and Differential   5. Postmenopausal status Z78.0 DX Hip/Pelvis/Spine   6. Need for shingles vaccine Z23 other medical supplies   7. Screening for AAA (abdominal aortic aneurysm) Z13.6  Aorta Medicare AAA Screening   8. Health care maintenance Z00.00        1.  Levothyroxine refilled as above.  TSH as above.  2.  Simvastatin refilled.  Labs as above.  3.  Stable.  Follows with psychiatry as above.  4.  Complete Blood Count ordered as  noted.  5.  DEXA ordered as above.  6.  Prescription given to obtain Shingrix at outside pharmacy.  7.  Ultrasound to screen for AAA ordered.  8.  DEXA and Aortic ultrasound as above.  She will call to schedule mammogram.  No further colonoscopy or Pap Smear due to age.  Vaccines are otherwise up to date.    Linda Moreno MD  [facility]

## 2019-03-20 ENCOUNTER — HOSPITAL ENCOUNTER (OUTPATIENT)
Dept: BONE DENSITY | Facility: OTHER | Age: 79
End: 2019-03-20
Attending: FAMILY MEDICINE
Payer: MEDICARE

## 2019-03-20 ENCOUNTER — HOSPITAL ENCOUNTER (OUTPATIENT)
Dept: ULTRASOUND IMAGING | Facility: OTHER | Age: 79
Discharge: HOME | End: 2019-03-20
Attending: FAMILY MEDICINE | Admitting: FAMILY MEDICINE
Payer: MEDICARE

## 2019-03-20 DIAGNOSIS — Z13.6 SCREENING FOR AAA (ABDOMINAL AORTIC ANEURYSM): ICD-10-CM

## 2019-03-20 DIAGNOSIS — E03.9 HYPOTHYROIDISM, UNSPECIFIED TYPE: ICD-10-CM

## 2019-03-20 DIAGNOSIS — E78.5 HYPERLIPIDEMIA, UNSPECIFIED HYPERLIPIDEMIA TYPE: ICD-10-CM

## 2019-03-20 DIAGNOSIS — Z78.0 POSTMENOPAUSAL STATUS: ICD-10-CM

## 2019-03-20 DIAGNOSIS — Z13.0 SCREENING, ANEMIA, DEFICIENCY, IRON: ICD-10-CM

## 2019-03-20 LAB
ALBUMIN SERPL-MCNC: 4.1 G/DL (ref 3.5–5.7)
ALP SERPL-CCNC: 62 U/L (ref 34–104)
ALT SERPL W P-5'-P-CCNC: 20 U/L (ref 7–52)
ANION GAP SERPL CALCULATED.3IONS-SCNC: 6 MMOL/L (ref 3–14)
AST SERPL W P-5'-P-CCNC: 23 U/L (ref 13–39)
BASOPHILS # BLD AUTO: 0.1 10E9/L (ref 0–0.2)
BASOPHILS NFR BLD AUTO: 1 %
BILIRUB SERPL-MCNC: 0.5 MG/DL (ref 0.3–1)
BUN SERPL-MCNC: 15 MG/DL (ref 7–25)
CALCIUM SERPL-MCNC: 9.4 MG/DL (ref 8.6–10.3)
CHLORIDE SERPL-SCNC: 104 MMOL/L (ref 98–107)
CHOLEST SERPL-MCNC: 164 MG/DL
CO2 SERPL-SCNC: 29 MMOL/L (ref 21–31)
CREAT SERPL-MCNC: 1.12 MG/DL (ref 0.6–1.2)
DIFFERENTIAL METHOD BLD: NORMAL
EOSINOPHIL # BLD AUTO: 0.2 10E9/L (ref 0–0.7)
EOSINOPHIL NFR BLD AUTO: 3.5 %
ERYTHROCYTE [DISTWIDTH] IN BLOOD BY AUTOMATED COUNT: 12.7 % (ref 10–15)
GFR SERPL CREATININE-BSD FRML MDRD: 47 ML/MIN/{1.73_M2}
GLUCOSE SERPL-MCNC: 115 MG/DL (ref 70–105)
HCT VFR BLD AUTO: 44.6 % (ref 35–47)
HDLC SERPL-MCNC: 51 MG/DL (ref 23–92)
HGB BLD-MCNC: 14.8 G/DL (ref 11.7–15.7)
IMM GRANULOCYTES # BLD: 0 10E9/L (ref 0–0.4)
IMM GRANULOCYTES NFR BLD: 0.1 %
LDLC SERPL CALC-MCNC: 94 MG/DL
LYMPHOCYTES # BLD AUTO: 2.9 10E9/L (ref 0.8–5.3)
LYMPHOCYTES NFR BLD AUTO: 41.3 %
MCH RBC QN AUTO: 30.3 PG (ref 26.5–33)
MCHC RBC AUTO-ENTMCNC: 33.2 G/DL (ref 31.5–36.5)
MCV RBC AUTO: 91 FL (ref 78–100)
MONOCYTES # BLD AUTO: 0.5 10E9/L (ref 0–1.3)
MONOCYTES NFR BLD AUTO: 7.1 %
NEUTROPHILS # BLD AUTO: 3.3 10E9/L (ref 1.6–8.3)
NEUTROPHILS NFR BLD AUTO: 47 %
NONHDLC SERPL-MCNC: 113 MG/DL
PLATELET # BLD AUTO: 279 10E9/L (ref 150–450)
POTASSIUM SERPL-SCNC: 4.1 MMOL/L (ref 3.5–5.1)
PROT SERPL-MCNC: 7.8 G/DL (ref 6.4–8.9)
RBC # BLD AUTO: 4.89 10E12/L (ref 3.8–5.2)
SODIUM SERPL-SCNC: 139 MMOL/L (ref 134–144)
TRIGL SERPL-MCNC: 95 MG/DL
TSH SERPL DL<=0.05 MIU/L-ACNC: 5.61 IU/ML (ref 0.34–5.6)
WBC # BLD AUTO: 6.9 10E9/L (ref 4–11)

## 2019-03-20 PROCEDURE — 85025 COMPLETE CBC W/AUTO DIFF WBC: CPT | Performed by: FAMILY MEDICINE

## 2019-03-20 PROCEDURE — 77080 DXA BONE DENSITY AXIAL: CPT

## 2019-03-20 PROCEDURE — 76706 US ABDL AORTA SCREEN AAA: CPT

## 2019-03-20 PROCEDURE — 84443 ASSAY THYROID STIM HORMONE: CPT | Performed by: FAMILY MEDICINE

## 2019-03-20 PROCEDURE — 80053 COMPREHEN METABOLIC PANEL: CPT | Performed by: FAMILY MEDICINE

## 2019-03-20 PROCEDURE — 80061 LIPID PANEL: CPT | Performed by: FAMILY MEDICINE

## 2019-03-20 PROCEDURE — 36415 COLL VENOUS BLD VENIPUNCTURE: CPT | Performed by: FAMILY MEDICINE

## 2019-03-25 DIAGNOSIS — E03.9 HYPOTHYROIDISM, UNSPECIFIED TYPE: ICD-10-CM

## 2019-03-25 RX ORDER — LEVOTHYROXINE SODIUM 88 UG/1
88 TABLET ORAL DAILY
Qty: 90 TABLET | Refills: 3 | Status: SHIPPED | OUTPATIENT
Start: 2019-03-25 | End: 2020-06-09

## 2019-04-09 ENCOUNTER — TRANSFERRED RECORDS (OUTPATIENT)
Dept: HEALTH INFORMATION MANAGEMENT | Facility: OTHER | Age: 79
End: 2019-04-09

## 2019-06-24 ENCOUNTER — OFFICE VISIT (OUTPATIENT)
Dept: FAMILY MEDICINE | Facility: OTHER | Age: 79
End: 2019-06-24
Attending: FAMILY MEDICINE
Payer: MEDICARE

## 2019-06-24 VITALS
RESPIRATION RATE: 20 BRPM | SYSTOLIC BLOOD PRESSURE: 120 MMHG | DIASTOLIC BLOOD PRESSURE: 80 MMHG | OXYGEN SATURATION: 93 % | HEIGHT: 63 IN | HEART RATE: 88 BPM | BODY MASS INDEX: 32.07 KG/M2 | TEMPERATURE: 97.6 F | WEIGHT: 181 LBS

## 2019-06-24 DIAGNOSIS — M85.80 OSTEOPENIA, UNSPECIFIED LOCATION: ICD-10-CM

## 2019-06-24 DIAGNOSIS — E03.9 HYPOTHYROIDISM, UNSPECIFIED TYPE: Primary | ICD-10-CM

## 2019-06-24 PROBLEM — M94.9 DISORDER OF BONE AND CARTILAGE: Status: RESOLVED | Noted: 2018-01-24 | Resolved: 2019-06-24

## 2019-06-24 PROBLEM — M89.9 DISORDER OF BONE AND CARTILAGE: Status: RESOLVED | Noted: 2018-01-24 | Resolved: 2019-06-24

## 2019-06-24 LAB — TSH SERPL DL<=0.05 MIU/L-ACNC: 4.32 IU/ML (ref 0.34–5.6)

## 2019-06-24 PROCEDURE — 36415 COLL VENOUS BLD VENIPUNCTURE: CPT | Mod: ZL | Performed by: FAMILY MEDICINE

## 2019-06-24 PROCEDURE — G0463 HOSPITAL OUTPT CLINIC VISIT: HCPCS

## 2019-06-24 PROCEDURE — 84443 ASSAY THYROID STIM HORMONE: CPT | Mod: ZL | Performed by: FAMILY MEDICINE

## 2019-06-24 PROCEDURE — 99213 OFFICE O/P EST LOW 20 MIN: CPT | Performed by: FAMILY MEDICINE

## 2019-06-24 ASSESSMENT — ANXIETY QUESTIONNAIRES

## 2019-06-24 ASSESSMENT — MIFFLIN-ST. JEOR: SCORE: 1265.14

## 2019-06-24 ASSESSMENT — ENCOUNTER SYMPTOMS
DIARRHEA: 0
NERVOUS/ANXIOUS: 0
CONSTIPATION: 0
FATIGUE: 0
FEVER: 0

## 2019-06-24 ASSESSMENT — PATIENT HEALTH QUESTIONNAIRE - PHQ9
SUM OF ALL RESPONSES TO PHQ QUESTIONS 1-9: 0
5. POOR APPETITE OR OVEREATING: NOT AT ALL

## 2019-06-24 ASSESSMENT — PAIN SCALES - GENERAL: PAINLEVEL: NO PAIN (0)

## 2019-06-24 NOTE — LETTER
Princess Leone  [address] [place] [address]    6/24/2019      Dear Ms. Leone,      I wanted to let you know about your recent results.  Your result are normal. Please contact us at [phone] with any questions or concerns that you have.    I attached your lab results for your records.        Sincerely,         Linda Moreno     Resulted Orders   TSH Reflex GH   Result Value Ref Range    TSH Reflex 4.32 0.34 - 5.60 IU/mL

## 2019-06-24 NOTE — NURSING NOTE
"Chief Complaint   Patient presents with     Recheck Medication       Initial /80 (BP Location: Right arm, Patient Position: Sitting, Cuff Size: Adult Regular)   Pulse 88   Temp 97.6  F (36.4  C) (Tympanic)   Resp 20   Ht 1.6 m (5' 3\")   Wt 82.1 kg (181 lb)   SpO2 93%   BMI 32.06 kg/m   Estimated body mass index is 32.06 kg/m  as calculated from the following:    Height as of this encounter: 1.6 m (5' 3\").    Weight as of this encounter: 82.1 kg (181 lb).  Medication Reconciliation: complete    Cely Wiseman LPN  "

## 2019-06-24 NOTE — PROGRESS NOTES
"  SUBJECTIVE:   Nursing Notes:   Cely Wiseman LPN  6/24/2019  9:08 AM  Sign at exiting of workspace  Chief Complaint   Patient presents with     Recheck Medication       Initial /80 (BP Location: Right arm, Patient Position: Sitting, Cuff Size: Adult Regular)   Pulse 88   Temp 97.6  F (36.4  C) (Tympanic)   Resp 20   Ht 1.6 m (5' 3\")   Wt 82.1 kg (181 lb)   SpO2 93%   BMI 32.06 kg/m    Estimated body mass index is 32.06 kg/m  as calculated from the following:    Height as of this encounter: 1.6 m (5' 3\").    Weight as of this encounter: 82.1 kg (181 lb).  Medication Reconciliation: complete    Cely Wiseman LPN    Princess Leone is a 79 year old female who presents to clinic today for follow up of her hypothyroidism.  She had been to see me in March.  She had a TSH at that time, which was mildly increased at 5.61.  We had increased her Levothyroxine to 88 mcg daily and recommended follow up for repeat lab.    She had a DEXA scan in March as well, which showed she had osteopenia.  Her risks were 17.3% and 5.6% for other fractures and hip fractures over the next 10 years respectively.      HPI    I personally reviewed medications/allergies/history listed below:    Patient Active Problem List    Diagnosis Date Noted     Osteopenia, unspecified location 06/24/2019     Priority: Medium     Allergic rhinitis 01/24/2018     Priority: Medium     Retinal vascular occlusion 01/24/2018     Priority: Medium     Overview:   Left eye subtotal central retinal vein occlusion.       Depression 01/24/2018     Priority: Medium     Dysthymia 01/24/2018     Priority: Medium     Hearing loss 01/24/2018     Priority: Medium     Overview:   Mild       Hypothyroidism 01/24/2018     Priority: Medium     Symptomatic menopausal or female climacteric states 01/24/2018     Priority: Medium     Abnormal glucose 01/24/2018     Priority: Medium     Vaginitis, atrophic 01/24/2018     Priority: Medium     CKD (chronic kidney " disease) 2016     Priority: Medium     Vitamin D deficiency 2015     Priority: Medium     Cerebral lesion 2012     Priority: Medium     Intermittent atrial fibrillation (H) 2012     Priority: Medium     Other nonspecific abnormal result of function study of brain and central nervous system 2012     Priority: Medium     Cerebral artery occlusion with cerebral infarction (H) 2012     Priority: Medium     Viral upper respiratory tract infection 2012     Priority: Medium     Abnormal electrocardiogram 2011     Priority: Medium     Hyperlipidemia 2010     Priority: Medium     Carpal tunnel syndrome 2003     Priority: Medium     Past Medical History:   Diagnosis Date     Cerebral infarction (H)     12,left parietal CVA with expressive aphasia.  MRI of brain after head CT showed multiple enhancing lesions, concerning for metastatic illness.  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  Referred to ENT and oncology 12.     Disorder of bone and cartilage 2018     Hypothyroidism     1993     Personal history of other medical treatment (CODE)     G3, P3-0-0-3 status post three NSVDs      Past Surgical History:   Procedure Laterality Date     COLONOSCOPY      approximately  in the [facility] - was normal.     OTHER SURGICAL HISTORY      .0,(IA) SURGICAL PROCEDURE,N/A     OTHER SURGICAL HISTORY      61870.0,PAST SURGICAL HISTORY,None.     Family History   Problem Relation Age of Onset     Heart Disease Mother         Heart Disease,D & C at 91/Heart Disease,pacemaker     Hypertension Mother         Hypertension     Other - See Comments Father          at 76.  He had peripheral vascular disease. Bypass./COPD     Heart Disease Brother         Heart Disease, carotid disease and has gone through  stenting     Other - See Comments Brother         tinnitis     Heart Disease Brother         Heart Disease,CABG x 3     Heart Surgery Son         Aortic Valve replacement due to bicuspid aortic valve.     Breast Cancer No family hx of         Cancer-breast     Social History     Tobacco Use     Smoking status: Never Smoker     Smokeless tobacco: Never Used   Substance Use Topics     Alcohol use: No     Social History     Social History Narrative    Retired from [employer]    3 grown kids and 5 grandkids    Lives with  in [facility]    [place] [address]    Calcium good    Water some    She is retired.  She and her  relocated to the [facility] from [place], [place].      Kirk Son, 4/10/64      Sh    tiffanie Daughter, 9/8/67      Karen Daughter, 3/9/69       Brian -      Current Outpatient Medications   Medication Sig Dispense Refill     ARIPiprazole (ABILIFY) 10 MG tablet Take 0.5 tablets (5 mg) by mouth daily 30 tablet 1     Aspirin Buf,CaCarb-MgCarb-MgO, (BUFFERED ASPIRIN) 325 MG TABS Take 1 tablet by mouth daily       cholecalciferol (D 5000) 5000 UNITS CAPS Take one by mouth daily.  Have lab level of vitamin d rechecked after completion of 90 days.       levothyroxine (SYNTHROID/LEVOTHROID) 88 MCG tablet Take 1 tablet (88 mcg) by mouth daily 90 tablet 3     nortriptyline (PAMELOR) 25 MG capsule Take 1 capsule by mouth 2 times daily       other medical supplies Shingrix.  Diagnosis:  Z23. 1 each 1     simvastatin (ZOCOR) 10 MG tablet Take 1 tablet (10 mg) by mouth daily 90 tablet 3     No Known Allergies    Review of Systems   Constitutional: Negative for fatigue and fever.   Gastrointestinal: Negative for constipation and diarrhea.   Endocrine: Negative for cold intolerance and heat intolerance.   Psychiatric/Behavioral: Negative for mood changes. The patient is not nervous/anxious.         OBJECTIVE:     /80 (BP Location: Right arm, Patient Position: Sitting, Cuff Size: Adult  "Regular)   Pulse 88   Temp 97.6  F (36.4  C) (Tympanic)   Resp 20   Ht 1.6 m (5' 3\")   Wt 82.1 kg (181 lb)   SpO2 93%   BMI 32.06 kg/m    Body mass index is 32.06 kg/m .  Physical Exam   Constitutional: She appears well-developed.   HENT:   Head: Normocephalic.   Eyes: Pupils are equal, round, and reactive to light.   Neck: Normal range of motion. Neck supple. No thyromegaly present.   Cardiovascular: Normal rate, regular rhythm, normal heart sounds and intact distal pulses.   No murmur heard.  Pulmonary/Chest: Effort normal and breath sounds normal. She has no wheezes. She has no rales.   Musculoskeletal: She exhibits no edema.   Lymphadenopathy:     She has no cervical adenopathy.         PHQ-9 SCORE 2/26/2018 3/11/2019 6/24/2019   PHQ-9 Total Score 0 0 0         ANGIE-7 SCORE 6/24/2019   Total Score 0             I personally reviewed results withpatient as listed below:   Diagnostic Test Results:  Results for orders placed or performed in visit on 06/24/19 (from the past 24 hour(s))   TSH Reflex GH   Result Value Ref Range    TSH Reflex 4.32 0.34 - 5.60 IU/mL       ASSESSMENT/PLAN:       ICD-10-CM    1. Hypothyroidism, unspecified type E03.9 TSH Reflex GH     TSH Reflex GH   2. Osteopenia, unspecified location M85.80        1.  TSH was completed today as above and is now in normal range.  Continue current dose of levothyroxine 88 mcg daily.  Follow-up as needed.  2.  Discussed risks and benefits of treatment with bisphosphonate medication such as Fosamax.  For the time being, she wishes to hold off on treatment with medication such as this.  Continue following her DEXA scans periodically.  She will be next due in 2021.    Linda Moreno MD  [facility]  "

## 2019-06-25 ASSESSMENT — ANXIETY QUESTIONNAIRES: GAD7 TOTAL SCORE: 0

## 2019-10-28 ENCOUNTER — HOSPITAL ENCOUNTER (OUTPATIENT)
Dept: MAMMOGRAPHY | Facility: OTHER | Age: 79
Discharge: HOME | End: 2019-10-28
Attending: FAMILY MEDICINE | Admitting: FAMILY MEDICINE
Payer: MEDICARE

## 2019-10-28 DIAGNOSIS — Z12.31 VISIT FOR SCREENING MAMMOGRAM: ICD-10-CM

## 2019-10-28 PROCEDURE — 77063 BREAST TOMOSYNTHESIS BI: CPT

## 2020-02-29 ENCOUNTER — MYC MEDICAL ADVICE (OUTPATIENT)
Dept: FAMILY MEDICINE | Facility: OTHER | Age: 80
End: 2020-02-29

## 2020-03-11 ENCOUNTER — HEALTH MAINTENANCE LETTER (OUTPATIENT)
Age: 80
End: 2020-03-11

## 2020-03-11 ENCOUNTER — MYC MEDICAL ADVICE (OUTPATIENT)
Dept: FAMILY MEDICINE | Facility: OTHER | Age: 80
End: 2020-03-11

## 2020-03-12 NOTE — TELEPHONE ENCOUNTER
See question below. It appears in last note it said to make a yearly appointment soon. Please advise. Cely Wiseman LPN ....................3/12/2020  8:23 AM

## 2020-05-18 ENCOUNTER — TRANSFERRED RECORDS (OUTPATIENT)
Dept: HEALTH INFORMATION MANAGEMENT | Facility: OTHER | Age: 80
End: 2020-05-18

## 2020-06-07 DIAGNOSIS — E78.5 HYPERLIPIDEMIA, UNSPECIFIED HYPERLIPIDEMIA TYPE: ICD-10-CM

## 2020-06-08 DIAGNOSIS — E03.9 HYPOTHYROIDISM, UNSPECIFIED TYPE: ICD-10-CM

## 2020-06-09 RX ORDER — LEVOTHYROXINE SODIUM 88 UG/1
TABLET ORAL
Qty: 90 TABLET | Refills: 0 | Status: SHIPPED | OUTPATIENT
Start: 2020-06-09 | End: 2020-07-16

## 2020-06-09 RX ORDER — SIMVASTATIN 10 MG
TABLET ORAL
Qty: 90 TABLET | Refills: 0 | Status: SHIPPED | OUTPATIENT
Start: 2020-06-09 | End: 2020-07-16

## 2020-06-09 NOTE — TELEPHONE ENCOUNTER
Simvastatin (ZOCOR) 10 MG tablet  Last Written Prescription Date: 03/11/2019  Last Fill Quantity: 90,   # refills: 3  Last Office Visit: 06/24/2019  Future Office visit:    Next 5 appointments (look out 90 days)    Jul 16, 2020  2:00 PM CDT  PHYSICAL with Linda Moreno MD  [facility] ([facility]) [address]  [place] [id number] [phone]           Routing refill request to provider for review/approval because:  Due for annual lipids.      Lab Test 03/20/19  0922   LDL 94       Unable to complete prescription refill per RNMedication Refill Policy.................... Mone Joy RN ....................  6/9/2020   11:21 AM

## 2020-06-09 NOTE — TELEPHONE ENCOUNTER
Levothyroxine (SYNTHROID/LEVOTHROID) 88 MCG tablet   Last Written Prescription Date: 03/25/2019  Last Fill Quantity: 90,   # refills: 3  Last Office Visit: 06/24/2019  Future Office visit:    Next 5 appointments (look out 90 days)    Jul 16, 2020  2:00 PM CDT  PHYSICAL with Linda Moreno MD  [facility] ([facility]) [address]  [place] [address]  [phone]           Routing refill request to provider for review/approval because:  Due for annual labs.     Unable to complete prescription refill per RNMedication Refill Policy.................... Mone Joy RN ....................  6/9/2020   11:58 AM

## 2020-07-16 ENCOUNTER — OFFICE VISIT (OUTPATIENT)
Dept: FAMILY MEDICINE | Facility: OTHER | Age: 80
End: 2020-07-16
Attending: FAMILY MEDICINE
Payer: COMMERCIAL

## 2020-07-16 VITALS
WEIGHT: 180.2 LBS | HEIGHT: 64 IN | HEART RATE: 108 BPM | OXYGEN SATURATION: 100 % | SYSTOLIC BLOOD PRESSURE: 140 MMHG | BODY MASS INDEX: 30.77 KG/M2 | DIASTOLIC BLOOD PRESSURE: 78 MMHG | TEMPERATURE: 98.7 F | RESPIRATION RATE: 16 BRPM

## 2020-07-16 DIAGNOSIS — Z13.0 SCREENING FOR DEFICIENCY ANEMIA: ICD-10-CM

## 2020-07-16 DIAGNOSIS — E78.5 HYPERLIPIDEMIA, UNSPECIFIED HYPERLIPIDEMIA TYPE: ICD-10-CM

## 2020-07-16 DIAGNOSIS — Z23 NEED FOR SHINGLES VACCINE: ICD-10-CM

## 2020-07-16 DIAGNOSIS — Z00.00 ENCOUNTER FOR MEDICARE ANNUAL WELLNESS EXAM: Primary | ICD-10-CM

## 2020-07-16 DIAGNOSIS — E03.9 HYPOTHYROIDISM, UNSPECIFIED TYPE: ICD-10-CM

## 2020-07-16 LAB
ALBUMIN SERPL-MCNC: 4.2 G/DL (ref 3.5–5.7)
ALP SERPL-CCNC: 56 U/L (ref 34–104)
ALT SERPL W P-5'-P-CCNC: 18 U/L (ref 7–52)
ANION GAP SERPL CALCULATED.3IONS-SCNC: 6 MMOL/L (ref 3–14)
AST SERPL W P-5'-P-CCNC: 18 U/L (ref 13–39)
BASOPHILS # BLD AUTO: 0.1 10E9/L (ref 0–0.2)
BASOPHILS NFR BLD AUTO: 0.8 %
BILIRUB SERPL-MCNC: 0.5 MG/DL (ref 0.3–1)
BUN SERPL-MCNC: 16 MG/DL (ref 7–25)
CALCIUM SERPL-MCNC: 9.5 MG/DL (ref 8.6–10.3)
CHLORIDE SERPL-SCNC: 105 MMOL/L (ref 98–107)
CHOLEST SERPL-MCNC: 158 MG/DL
CO2 SERPL-SCNC: 29 MMOL/L (ref 21–31)
CREAT SERPL-MCNC: 1.2 MG/DL (ref 0.6–1.2)
DIFFERENTIAL METHOD BLD: NORMAL
EOSINOPHIL # BLD AUTO: 0.2 10E9/L (ref 0–0.7)
EOSINOPHIL NFR BLD AUTO: 3.1 %
ERYTHROCYTE [DISTWIDTH] IN BLOOD BY AUTOMATED COUNT: 12.4 % (ref 10–15)
GFR SERPL CREATININE-BSD FRML MDRD: 43 ML/MIN/{1.73_M2}
GLUCOSE SERPL-MCNC: 128 MG/DL (ref 70–105)
HCT VFR BLD AUTO: 46 % (ref 35–47)
HDLC SERPL-MCNC: 50 MG/DL (ref 23–92)
HGB BLD-MCNC: 14.7 G/DL (ref 11.7–15.7)
IMM GRANULOCYTES # BLD: 0 10E9/L (ref 0–0.4)
IMM GRANULOCYTES NFR BLD: 0.3 %
LDLC SERPL CALC-MCNC: 82 MG/DL
LYMPHOCYTES # BLD AUTO: 2.7 10E9/L (ref 0.8–5.3)
LYMPHOCYTES NFR BLD AUTO: 36.6 %
MCH RBC QN AUTO: 30.1 PG (ref 26.5–33)
MCHC RBC AUTO-ENTMCNC: 32 G/DL (ref 31.5–36.5)
MCV RBC AUTO: 94 FL (ref 78–100)
MONOCYTES # BLD AUTO: 0.6 10E9/L (ref 0–1.3)
MONOCYTES NFR BLD AUTO: 8.2 %
NEUTROPHILS # BLD AUTO: 3.8 10E9/L (ref 1.6–8.3)
NEUTROPHILS NFR BLD AUTO: 51 %
NONHDLC SERPL-MCNC: 108 MG/DL
PLATELET # BLD AUTO: 293 10E9/L (ref 150–450)
POTASSIUM SERPL-SCNC: 4 MMOL/L (ref 3.5–5.1)
PROT SERPL-MCNC: 7.5 G/DL (ref 6.4–8.9)
RBC # BLD AUTO: 4.89 10E12/L (ref 3.8–5.2)
SODIUM SERPL-SCNC: 140 MMOL/L (ref 134–144)
TRIGL SERPL-MCNC: 128 MG/DL
TSH SERPL DL<=0.05 MIU/L-ACNC: 4.91 IU/ML (ref 0.34–5.6)
WBC # BLD AUTO: 7.5 10E9/L (ref 4–11)

## 2020-07-16 PROCEDURE — G0463 HOSPITAL OUTPT CLINIC VISIT: HCPCS

## 2020-07-16 PROCEDURE — G0439 PPPS, SUBSEQ VISIT: HCPCS | Performed by: FAMILY MEDICINE

## 2020-07-16 PROCEDURE — 80061 LIPID PANEL: CPT | Mod: ZL | Performed by: FAMILY MEDICINE

## 2020-07-16 PROCEDURE — 85025 COMPLETE CBC W/AUTO DIFF WBC: CPT | Mod: ZL | Performed by: FAMILY MEDICINE

## 2020-07-16 PROCEDURE — 36415 COLL VENOUS BLD VENIPUNCTURE: CPT | Mod: ZL | Performed by: FAMILY MEDICINE

## 2020-07-16 PROCEDURE — 84443 ASSAY THYROID STIM HORMONE: CPT | Mod: ZL | Performed by: FAMILY MEDICINE

## 2020-07-16 PROCEDURE — 80053 COMPREHEN METABOLIC PANEL: CPT | Mod: ZL | Performed by: FAMILY MEDICINE

## 2020-07-16 RX ORDER — SIMVASTATIN 10 MG
10 TABLET ORAL DAILY
Qty: 90 TABLET | Refills: 3 | Status: SHIPPED | OUTPATIENT
Start: 2020-07-16 | End: 2021-10-18

## 2020-07-16 RX ORDER — LEVOTHYROXINE SODIUM 88 UG/1
88 TABLET ORAL DAILY
Qty: 90 TABLET | Refills: 3 | Status: SHIPPED | OUTPATIENT
Start: 2020-07-16 | End: 2022-01-26

## 2020-07-16 ASSESSMENT — PAIN SCALES - GENERAL: PAINLEVEL: NO PAIN (0)

## 2020-07-16 ASSESSMENT — MIFFLIN-ST. JEOR: SCORE: 1268.89

## 2020-07-16 ASSESSMENT — PATIENT HEALTH QUESTIONNAIRE - PHQ9: SUM OF ALL RESPONSES TO PHQ QUESTIONS 1-9: 0

## 2020-07-16 NOTE — PROGRESS NOTES
"SUBJECTIVE:   Princess Leone is a 80 year old female who presents for Preventive Visit.    Are you in the first 12 months of your Medicare Part B coverage?  No    Physical Health:    In general, how would you rate your overall physical health? excellent    Outside of work, how many days during the week do you exercise? 2-3 days/week    Outside of work, approximately how many minutes a day do you exercise?15-30 minutes    If you drink alcohol do you typically have >3 drinks per day or >7 drinks per week? No    Do you usually eat at least 4 servings of fruit and vegetables a day, include whole grains & fiber and avoid regularly eating high fat or \"junk\" foods? No    Do you have any problems taking medications regularly?  No    Do you have any side effects from medications? none    Needs assistance for the following daily activities: no assistance needed    Which of the following safety concerns are present in your home?  throw rugs in the hallway and lack of grab bars in the bathroom     Hearing impairment: No    In the past 6 months, have you been bothered by leaking of urine? no    Mental Health:    In general, how would you rate your overall mental or emotional health? excellent  PHQ-2 Score: 0    Do you feel safe in your environment? Yes    Have you ever done Advance Care Planning? (For example, a Health Directive, POLST, or a discussion with a medical provider or your loved ones about your wishes): Yes, advance care planning is on file.    Additional concerns to address?  No    Fall risk:   No falls.     Cognitive Screenin) Repeat 3 items (Leader, Season, Table)    2) Clock draw: NORMAL  3) 3 item recall: Recalls 1 object   Results: NORMAL clock, 1-2 items recalled: COGNITIVE IMPAIRMENT LESS LIKELY    Mini-CogTM Copyright NISHI Carlton. Licensed by the author for use in [facility]; reprinted with permission (kirby@.[facility]). All rights reserved.      Do you have sleep apnea, excessive snoring or " "daytime drowsiness?: no    Reviewed and updated as needed this visit by clinical staff  Tobacco  Allergies  Meds         Reviewed and updated as needed this visit by Provider        Social History     Tobacco Use     Smoking status: Never Smoker     Smokeless tobacco: Never Used   Substance Use Topics     Alcohol use: No                           Current providers sharing in care for this patient include:   Patient Care Team:  Linda Moreno MD as PCP - General (Family Practice)  Linda Moreno MD as Assigned PCP    The following health maintenance items are reviewed in Epic and correct as of today:  Health Maintenance   Topic Date Due     MEDICARE ANNUAL WELLNESS VISIT  04/02/2005     PHQ-9  12/24/2019     FALL RISK ASSESSMENT  06/24/2020     ZOSTER IMMUNIZATION (3 of 3) 11/01/2019     INFLUENZA VACCINE (1) 09/01/2020     DTAP/TDAP/TD IMMUNIZATION (2 - Tdap) 06/09/2021     ADVANCE CARE PLANNING  01/21/2024     LIPID  03/20/2024     DEXA  Completed     DEPRESSION ACTION PLAN  Completed     PNEUMOCOCCAL IMMUNIZATION 65+ LOW/MEDIUM RISK  Completed     IPV IMMUNIZATION  Aged Out     MENINGITIS IMMUNIZATION  Aged Out     HEPATITIS B IMMUNIZATION  Aged Out     Lab work is in process      ROS:  Constitutional, HEENT, cardiovascular, pulmonary, gi and gu systems are negative, except as otherwise noted.    OBJECTIVE:   BP (!) 140/78 (BP Location: Right arm, Patient Position: Sitting, Cuff Size: Adult Regular)   Pulse 108   Temp 98.7  F (37.1  C) (Tympanic)   Resp 16   Ht 1.62 m (5' 3.78\")   Wt 81.7 kg (180 lb 3.2 oz)   SpO2 100%   BMI 31.15 kg/m   Estimated body mass index is 31.15 kg/m  as calculated from the following:    Height as of this encounter: 1.62 m (5' 3.78\").    Weight as of this encounter: 81.7 kg (180 lb 3.2 oz).  EXAM:   GENERAL APPEARANCE: healthy, alert and no distress  EYES: Eyes grossly normal to inspection, PERRL and conjunctivae and sclerae normal  HENT: ear canals and " TM's normal, nose and mouth without ulcers or lesions, oropharynx clear and oral mucous membranes moist  NECK: no adenopathy, no asymmetry, masses, or scars and thyroid normal to palpation  RESP: lungs clear to auscultation - no rales, rhonchi or wheezes  BREAST: deferred per patient.  CV: regular rate and rhythm, normal S1 S2, no S3 or S4, no murmur, click or rub, no peripheral edema and peripheral pulses strong  ABDOMEN: soft, nontender, no hepatosplenomegaly, no masses and bowel sounds normal   (female): deferred per patient.  MS: no musculoskeletal defects are noted and gait is age appropriate without ataxia  SKIN: no suspicious lesions or rashes  NEURO: Normal strength and tone, sensory exam grossly normal, mentation intact and speech normal  PSYCH: mentation appears normal and affect normal/bright    Diagnostic Test Results:  Labs reviewed in Epic    ASSESSMENT / PLAN:       ICD-10-CM    1. Encounter for Medicare annual wellness exam  Z00.00    2. Hypothyroidism, unspecified type  E03.9 levothyroxine (SYNTHROID/LEVOTHROID) 88 MCG tablet     TSH Reflex GH     TSH Reflex GH   3. Hyperlipidemia, unspecified hyperlipidemia type  E78.5 simvastatin (ZOCOR) 10 MG tablet     Lipid Panel     Comprehensive metabolic panel     Comprehensive metabolic panel     Lipid Panel   4. Need for shingles vaccine  Z23 other medical supplies   5. Screening for deficiency anemia  Z13.0 CBC and Differential     CBC and Differential     1.  Mammogram last completed on 10/28/2019 and was normal.  Last DEXA scan was on 3/8/2019 and showed osteopenia.  Colonoscopy no longer indicated due to age.  Pap also not indicated due to age.  She had screening for abdominal aortic aneurysm on 3/20/2019.  Ultrasound was normal at that time.  Vaccines reviewed.  Other than Shingrix No. 2, she is otherwise up-to-date on her vaccines.  2.  Levothyroxine refilled.  TSH obtained as above.  3.  Simvastatin refilled.  Labs obtained as above.  She wanted  "to do these today while she is here.  She is nonfasting.  4.  Prescription given to obtain Shingrix No. 2 at outside pharmacy.  5.  CBC ordered as above.     COUNSELING:  Reviewed preventive health counseling, as reflected in patient instructions       Regular exercise       Healthy diet/nutrition       Vision screening       Hearing screening       Fall risk prevention       Osteoporosis Prevention/Bone Health    Estimated body mass index is 31.15 kg/m  as calculated from the following:    Height as of this encounter: 1.62 m (5' 3.78\").    Weight as of this encounter: 81.7 kg (180 lb 3.2 oz).    Weight management plan: Discussed healthy diet and exercise guidelines     reports that she has never smoked. She has never used smokeless tobacco.      Appropriate preventive services were discussed with this patient, including applicable screening as appropriate for cardiovascular disease, diabetes, osteopenia/osteoporosis, and glaucoma.  As appropriate for age/gender, discussed screening for colorectal cancer, prostate cancer, breast cancer, and cervical cancer. Checklist reviewing preventive services available has been given to the patient.    Reviewed patients plan of care and provided an AVS. The Basic Care Plan (routine screening as documented in Health Maintenance) for Princess meets the Care Plan requirement. This Care Plan has been established and reviewed with the Patient.    Counseling Resources:  ATP IV Guidelines  Pooled Cohorts Equation Calculator  Breast Cancer Risk Calculator  FRAX Risk Assessment  ICSI Preventive Guidelines  Dietary Guidelines for Americans, 2010  USDA's MyPlate  ASA Prophylaxis  Lung CA Screening    Linda Moreno MD  [facility] AND [facility]  "

## 2020-07-16 NOTE — NURSING NOTE
Patient here for yearly physical.   Medication Reconciliation: complete    Aneta Garcia LPN  7/16/2020 2:04 PM

## 2020-07-16 NOTE — PATIENT INSTRUCTIONS
Patient Education   Personalized Prevention Plan  You are due for the preventive services outlined below.  Your care team is available to assist you in scheduling these services.  If you have already completed any of these items, please share that information with your care team to update in your medical record.  Health Maintenance Due   Topic Date Due     Annual Wellness Visit  04/02/2005     Depression Assessment  12/24/2019     FALL RISK ASSESSMENT  06/24/2020     Zoster (Shingles) Vaccine (3 of 3) 11/01/2019

## 2020-09-01 ENCOUNTER — OFFICE VISIT (OUTPATIENT)
Dept: FAMILY MEDICINE | Facility: OTHER | Age: 80
End: 2020-09-01
Attending: FAMILY MEDICINE
Payer: MEDICARE

## 2020-09-01 ENCOUNTER — HOSPITAL ENCOUNTER (OUTPATIENT)
Dept: GENERAL RADIOLOGY | Facility: OTHER | Age: 80
End: 2020-09-01
Attending: FAMILY MEDICINE
Payer: MEDICARE

## 2020-09-01 VITALS
BODY MASS INDEX: 31.28 KG/M2 | TEMPERATURE: 97.9 F | HEART RATE: 86 BPM | SYSTOLIC BLOOD PRESSURE: 130 MMHG | RESPIRATION RATE: 16 BRPM | WEIGHT: 181 LBS | OXYGEN SATURATION: 93 % | DIASTOLIC BLOOD PRESSURE: 86 MMHG

## 2020-09-01 DIAGNOSIS — M75.41 IMPINGEMENT SYNDROME, SHOULDER, RIGHT: Primary | ICD-10-CM

## 2020-09-01 DIAGNOSIS — G89.29 CHRONIC RIGHT SHOULDER PAIN: ICD-10-CM

## 2020-09-01 DIAGNOSIS — M25.511 CHRONIC RIGHT SHOULDER PAIN: ICD-10-CM

## 2020-09-01 PROCEDURE — G0463 HOSPITAL OUTPT CLINIC VISIT: HCPCS | Mod: 25

## 2020-09-01 PROCEDURE — 73030 X-RAY EXAM OF SHOULDER: CPT | Mod: RT

## 2020-09-01 PROCEDURE — 99213 OFFICE O/P EST LOW 20 MIN: CPT | Performed by: FAMILY MEDICINE

## 2020-09-01 PROCEDURE — G0463 HOSPITAL OUTPT CLINIC VISIT: HCPCS

## 2020-09-01 ASSESSMENT — PAIN SCALES - GENERAL: PAINLEVEL: NO PAIN (0)

## 2020-09-01 ASSESSMENT — ENCOUNTER SYMPTOMS
COUGH: 0
FEVER: 0
ARTHRALGIAS: 1
CHILLS: 0
NERVOUS/ANXIOUS: 0

## 2020-09-01 NOTE — NURSING NOTE
"Patient here for pain in right arm. She states she has felt this for a month now. She states any time she tries to lift or do things it \"gives me problems.\" She states it is more in the shoulder area. She states the pain is not constant but comes and goes.   She denies injuring it.    Medication Reconciliation: varghese Garcia LPN  9/1/2020 1:04 PM  "

## 2020-09-01 NOTE — PATIENT INSTRUCTIONS
For your right shoulder pain, I referred you to Physical Therapy.  You may also try icing it 2-3 times per day.  You may also take over the counter aleve 1-2 tablets (220 mg each) twice daily x 1-2 weeks.    If your shoulder does not improve with Physical Therapy, I would recommend an MRI as a next step.

## 2020-09-01 NOTE — PROGRESS NOTES
"  SUBJECTIVE:   Nursing Notes:   Aneta Garcia LPN  9/1/2020  1:06 PM  Sign at exiting of workspace  Patient here for pain in right arm. She states she has felt this for a month now. She states any time she tries to lift or do things it \"gives me problems.\" She states it is more in the shoulder area. She states the pain is not constant but comes and goes.   She denies injuring it.    Medication Reconciliation: varghese Garcia LPN  9/1/2020 1:04 PM    Princess Leone is a 80 year old female who presents to clinic today for right shoulder and upper arm pain.  Hurts to reach overhead.  Hurts to lift anything in front of her.  No injuries or recent overuse activities.  Hurts to reach behind her or to brush her hair.  Has to use her left hand to brush hair.  She is left handed.  Doesn't hurt to lay on her right side.  Doesn't wake her at night.    HPI    I personally reviewed medications/allergies/history listed below:    Patient Active Problem List    Diagnosis Date Noted     Osteopenia, unspecified location 06/24/2019     Priority: Medium     Allergic rhinitis 01/24/2018     Priority: Medium     Retinal vascular occlusion 01/24/2018     Priority: Medium     Overview:   Left eye subtotal central retinal vein occlusion.       Depression 01/24/2018     Priority: Medium     Dysthymia 01/24/2018     Priority: Medium     Hearing loss 01/24/2018     Priority: Medium     Overview:   Mild       Hypothyroidism 01/24/2018     Priority: Medium     Symptomatic menopausal or female climacteric states 01/24/2018     Priority: Medium     Abnormal glucose 01/24/2018     Priority: Medium     Vaginitis, atrophic 01/24/2018     Priority: Medium     CKD (chronic kidney disease) 11/02/2016     Priority: Medium     Vitamin D deficiency 03/19/2015     Priority: Medium     Cerebral lesion 05/17/2012     Priority: Medium     Intermittent atrial fibrillation (H) 05/17/2012     Priority: Medium     Other nonspecific " abnormal result of function study of brain and central nervous system 2012     Priority: Medium     Cerebral artery occlusion with cerebral infarction (H) 2012     Priority: Medium     Viral upper respiratory tract infection 2012     Priority: Medium     Abnormal electrocardiogram 2011     Priority: Medium     Hyperlipidemia 2010     Priority: Medium     Carpal tunnel syndrome 2003     Priority: Medium     Past Medical History:   Diagnosis Date     Cerebral infarction (H)     12,left parietal CVA with expressive aphasia.  MRI of brain after head CT showed multiple enhancing lesions, concerning for metastatic illness.  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  Referred to ENT and oncology 12.     Disorder of bone and cartilage 2018     Hypothyroidism          Personal history of other medical treatment (CODE)     G3, P3-0-0-3 status post three NSVDs      Past Surgical History:   Procedure Laterality Date     COLONOSCOPY      approximately  in the [facility] - was normal.     OTHER SURGICAL HISTORY      .0,(IA) SURGICAL PROCEDURE,N/A     OTHER SURGICAL HISTORY      06446.0,PAST SURGICAL HISTORY,None.     Family History   Problem Relation Age of Onset     Heart Disease Mother         Heart Disease,D & C at 91/Heart Disease,pacemaker     Hypertension Mother         Hypertension     Other - See Comments Father          at 76.  He had peripheral vascular disease. Bypass./COPD     Heart Disease Brother         Heart Disease, carotid disease and has gone through stenting     Other - See Comments Brother         tinnitis     Heart Disease Brother         Heart Disease,CABG x 3     Heart Surgery Son         Aortic Valve replacement due to bicuspid aortic valve.     Breast Cancer No family hx of          Cancer-breast     Social History     Tobacco Use     Smoking status: Never Smoker     Smokeless tobacco: Never Used   Substance Use Topics     Alcohol use: No     Social History     Social History Narrative    Retired from  teaching    3 grown kids and 5 grandkids    Lives with  in [facility]    Caffiene 1    Calcium good    Water some    She is retired.  She and her  relocated to the [facility] from [place], [place].      Kirk Son, 4/10/64      Sh    tiffanie Daughter, 9/8/67      Karen Daughter, 3/9/69       Brian -      Current Outpatient Medications   Medication Sig Dispense Refill     ARIPiprazole (ABILIFY) 10 MG tablet Take 0.5 tablets (5 mg) by mouth daily 30 tablet 1     Aspirin Buf,CaCarb-MgCarb-MgO, (BUFFERED ASPIRIN) 325 MG TABS Take 1 tablet by mouth daily       cholecalciferol (D 5000) 5000 UNITS CAPS Take one by mouth daily.  Have lab level of vitamin d rechecked after completion of 90 days.       levothyroxine (SYNTHROID/LEVOTHROID) 88 MCG tablet Take 1 tablet (88 mcg) by mouth daily 90 tablet 3     nortriptyline (PAMELOR) 25 MG capsule Take 1 capsule by mouth 2 times daily       simvastatin (ZOCOR) 10 MG tablet Take 1 tablet (10 mg) by mouth daily 90 tablet 3     other medical supplies Shingrix.  Diagnosis:  Z23. 1 each 0     No Known Allergies    Review of Systems   Constitutional: Negative for chills and fever.   Respiratory: Negative for cough.    Musculoskeletal: Positive for arthralgias.   Psychiatric/Behavioral: Negative for mood changes. The patient is not nervous/anxious.         OBJECTIVE:     /86 (BP Location: Right arm, Patient Position: Sitting, Cuff Size: Adult Large)   Pulse 86   Temp 97.9  F (36.6  C) (Tympanic)   Resp 16   Wt 82.1 kg (181 lb)   SpO2 93%   BMI 31.28 kg/m    Body mass index is 31.28 kg/m .  Physical Exam  Constitutional:       Appearance: Normal appearance.   HENT:      Head: Normocephalic.   Eyes:      Pupils: Pupils are  equal, round, and reactive to light.   Musculoskeletal:      Comments: Right shoulder:  She can only abduct her shoulder to 90 degrees both actively and passively.  Positive Neer's and Hawkin's signs.  Empty can test negative.  Limited range of motion with internal and external rotation.   Neurological:      Mental Status: She is alert.   Psychiatric:         Mood and Affect: Mood normal.             PHQ-2 Score:     PHQ-2 ( 1999 Pfizer) 9/1/2020 7/16/2020   Q1: Little interest or pleasure in doing things 0 0   Q2: Feeling down, depressed or hopeless 0 0   PHQ-2 Score 0 0       ANGIE-7 SCORE 6/24/2019   Total Score 0           I personally reviewed results withpatient as listed below:   Diagnostic Test Results:  Results for orders placed or performed during the hospital encounter of 09/01/20   XR Shoulder Right G/E 3 Views     Status: None    Narrative    PROCEDURE:  XR SHOULDER RT G/E 3 VW    HISTORY: Chronic right shoulder pain; Chronic right shoulder pain    COMPARISON:  None.    TECHNIQUE:  3 views of the right shoulder were obtained.    FINDINGS:  No fracture or dislocation is identified. The joint spaces  are preserved.  Clavicle scapula and proximal humerus are intact.      Impression    IMPRESSION: Normal right shoulder      KB LAINEZ MD        ASSESSMENT/PLAN:       ICD-10-CM    1. Impingement syndrome, shoulder, right  M75.41 PHYSICAL THERAPY REFERRAL   2. Chronic right shoulder pain  M25.511 XR Shoulder Right G/E 3 Views    G89.29        1.  Minimal degenerative changes noted on x-ray.  Offered trial of Physical Therapy vs MRI.  She is interested in trying Physical Therapy to see if she has any benefit.  See other recommendations as noted below.  2.  See #1.    For your right shoulder pain, I referred you to Physical Therapy.  You may also try icing it 2-3 times per day.  You may also take over the counter aleve 1-2 tablets (220 mg each) twice daily x 1-2 weeks.    If your shoulder does not improve  with Physical Therapy, I would recommend an MRI as a next step.      Linda Moreno MD  [facility] AND [facility]    Portions of this dictation were created using the Dragon Nuance voice recognition system. Proofreading was completed but there may be errors in text.

## 2020-09-23 ENCOUNTER — HOSPITAL ENCOUNTER (OUTPATIENT)
Dept: PHYSICAL THERAPY | Facility: OTHER | Age: 80
Setting detail: THERAPIES SERIES
End: 2020-09-23
Attending: FAMILY MEDICINE
Payer: MEDICARE

## 2020-09-23 DIAGNOSIS — M75.41 IMPINGEMENT SYNDROME, SHOULDER, RIGHT: ICD-10-CM

## 2020-09-23 PROCEDURE — 97110 THERAPEUTIC EXERCISES: CPT | Mod: GP

## 2020-09-23 PROCEDURE — 97161 PT EVAL LOW COMPLEX 20 MIN: CPT | Mod: GP

## 2020-09-23 NOTE — PROGRESS NOTES
[facility]          OUTPATIENT PHYSICAL THERAPY ORTHOPEDIC EVALUATION  PLAN OF TREATMENT FOR OUTPATIENT REHABILITATION  (COMPLETE FOR INITIAL CLAIMS ONLY)  Patient's Last Name, First Name, M.I.  YOB: 1940  Princess Leone    Provider s Name:  [facility]   Medical Record No.  [id number]   Start of Care Date:  09/23/20   Onset Date:  08/01/20   Type:     _X__PT   ___OT   ___SLP Medical Diagnosis:  (P) Impingement syndrome, shoulder, right M75.41      PT Diagnosis:  (P) Impingement syndrome, shoulder, right M75.41    Visits from SOC:  1      _________________________________________________________________________________  Plan of Treatment/Functional Goals:  joint mobilization, manual therapy, neuromuscular re-education, ROM, strengthening, stretching     Ultrasound     Goals  Goal Identifier: (P) Pain   Goal Description: (P) Report pain at 2/10 or less to allow lifting a coffee cup without difficulty   Target Date: (P) 11/18/20    Goal Identifier: (P) ROM   Goal Description: (P) Demonstrate right shoulder flexion to 140 degrees to allow reaching to an overhead shelf without difficulty.   Target Date: (P) 11/18/20    Goal Identifier: (P) Weakness  Goal Description: (P) Improve strength to 4/5 shoulder flexion to allow lifting a one pound object to an overhead shelf  Target Date: (P) 11/18/20                                                           Therapy Frequency:  (P) (16 visits )  Predicted Duration of Therapy Intervention:  (P) 8 weeks     Roe Rasheed, PT                 I CERTIFY THE NEED FOR THESE SERVICES FURNISHED UNDER        THIS PLAN OF TREATMENT AND WHILE UNDER MY CARE     (Physician co-signature of this document indicates review and certification of the therapy plan).                       Certification Date From:  (P) 09/23/20   Certification Date To:  (P) 11/18/20    Referring Provider:  Dr. Moreno    Initial Assessment        See Epic  Evaluation Start of Care Date: 09/23/20

## 2020-09-29 ENCOUNTER — HOSPITAL ENCOUNTER (OUTPATIENT)
Dept: PHYSICAL THERAPY | Facility: OTHER | Age: 80
Setting detail: THERAPIES SERIES
End: 2020-09-29
Attending: FAMILY MEDICINE
Payer: MEDICARE

## 2020-09-29 PROCEDURE — 97140 MANUAL THERAPY 1/> REGIONS: CPT | Mod: GP

## 2020-09-29 PROCEDURE — 97110 THERAPEUTIC EXERCISES: CPT | Mod: GP

## 2020-10-05 ENCOUNTER — HOSPITAL ENCOUNTER (OUTPATIENT)
Dept: PHYSICAL THERAPY | Facility: OTHER | Age: 80
Setting detail: THERAPIES SERIES
End: 2020-10-05
Attending: FAMILY MEDICINE
Payer: MEDICARE

## 2020-10-05 PROCEDURE — 97110 THERAPEUTIC EXERCISES: CPT | Mod: GP

## 2020-10-05 PROCEDURE — 97140 MANUAL THERAPY 1/> REGIONS: CPT | Mod: GP

## 2020-10-07 ENCOUNTER — HOSPITAL ENCOUNTER (OUTPATIENT)
Dept: PHYSICAL THERAPY | Facility: OTHER | Age: 80
Setting detail: THERAPIES SERIES
End: 2020-10-07
Attending: FAMILY MEDICINE
Payer: MEDICARE

## 2020-10-07 PROCEDURE — 97140 MANUAL THERAPY 1/> REGIONS: CPT | Mod: GP

## 2020-10-07 PROCEDURE — 97110 THERAPEUTIC EXERCISES: CPT | Mod: GP

## 2020-10-21 ENCOUNTER — MYC MEDICAL ADVICE (OUTPATIENT)
Dept: FAMILY MEDICINE | Facility: OTHER | Age: 80
End: 2020-10-21

## 2020-10-22 NOTE — TELEPHONE ENCOUNTER
This patient needs to be seen for evaluation.  Please call them to schedule an appointment.  Linda Moreno MD

## 2020-10-22 NOTE — TELEPHONE ENCOUNTER
Notified patient of response per Linda Moreno MD and she verbally understood.  Assisted in scheduling office visit for tomorrow 10/23/20 at 1:40 to be seen for dizziness.    Katie Raman LPN............10/22/2020 3:06 PM

## 2020-10-23 ENCOUNTER — OFFICE VISIT (OUTPATIENT)
Dept: FAMILY MEDICINE | Facility: OTHER | Age: 80
End: 2020-10-23
Attending: FAMILY MEDICINE
Payer: MEDICARE

## 2020-10-23 VITALS
HEIGHT: 64 IN | WEIGHT: 169.5 LBS | DIASTOLIC BLOOD PRESSURE: 84 MMHG | SYSTOLIC BLOOD PRESSURE: 132 MMHG | BODY MASS INDEX: 28.94 KG/M2 | TEMPERATURE: 97.8 F | RESPIRATION RATE: 16 BRPM | HEART RATE: 100 BPM

## 2020-10-23 DIAGNOSIS — R42 DIZZINESS: Primary | ICD-10-CM

## 2020-10-23 DIAGNOSIS — Z23 NEEDS FLU SHOT: ICD-10-CM

## 2020-10-23 DIAGNOSIS — M25.511 RIGHT SHOULDER PAIN, UNSPECIFIED CHRONICITY: ICD-10-CM

## 2020-10-23 DIAGNOSIS — R29.6 RECURRENT FALLS: ICD-10-CM

## 2020-10-23 LAB
ALBUMIN SERPL-MCNC: 4 G/DL (ref 3.5–5.7)
ALP SERPL-CCNC: 59 U/L (ref 34–104)
ALT SERPL W P-5'-P-CCNC: 15 U/L (ref 7–52)
ANION GAP SERPL CALCULATED.3IONS-SCNC: 7 MMOL/L (ref 3–14)
AST SERPL W P-5'-P-CCNC: 17 U/L (ref 13–39)
BASOPHILS # BLD AUTO: 0 10E9/L (ref 0–0.2)
BASOPHILS NFR BLD AUTO: 0.5 %
BILIRUB SERPL-MCNC: 0.4 MG/DL (ref 0.3–1)
BUN SERPL-MCNC: 16 MG/DL (ref 7–25)
CALCIUM SERPL-MCNC: 9.9 MG/DL (ref 8.6–10.3)
CHLORIDE SERPL-SCNC: 104 MMOL/L (ref 98–107)
CO2 SERPL-SCNC: 30 MMOL/L (ref 21–31)
CREAT SERPL-MCNC: 1.15 MG/DL (ref 0.6–1.2)
DIFFERENTIAL METHOD BLD: NORMAL
EOSINOPHIL # BLD AUTO: 0.2 10E9/L (ref 0–0.7)
EOSINOPHIL NFR BLD AUTO: 2.5 %
ERYTHROCYTE [DISTWIDTH] IN BLOOD BY AUTOMATED COUNT: 12.4 % (ref 10–15)
GFR SERPL CREATININE-BSD FRML MDRD: 45 ML/MIN/{1.73_M2}
GLUCOSE SERPL-MCNC: 113 MG/DL (ref 70–105)
HCT VFR BLD AUTO: 45.2 % (ref 35–47)
HGB BLD-MCNC: 14.7 G/DL (ref 11.7–15.7)
IMM GRANULOCYTES # BLD: 0 10E9/L (ref 0–0.4)
IMM GRANULOCYTES NFR BLD: 0.2 %
LYMPHOCYTES # BLD AUTO: 3.1 10E9/L (ref 0.8–5.3)
LYMPHOCYTES NFR BLD AUTO: 36.7 %
MCH RBC QN AUTO: 30.1 PG (ref 26.5–33)
MCHC RBC AUTO-ENTMCNC: 32.5 G/DL (ref 31.5–36.5)
MCV RBC AUTO: 92 FL (ref 78–100)
MONOCYTES # BLD AUTO: 0.7 10E9/L (ref 0–1.3)
MONOCYTES NFR BLD AUTO: 8.2 %
NEUTROPHILS # BLD AUTO: 4.4 10E9/L (ref 1.6–8.3)
NEUTROPHILS NFR BLD AUTO: 51.9 %
PLATELET # BLD AUTO: 315 10E9/L (ref 150–450)
POTASSIUM SERPL-SCNC: 4.1 MMOL/L (ref 3.5–5.1)
PROT SERPL-MCNC: 7.2 G/DL (ref 6.4–8.9)
RBC # BLD AUTO: 4.89 10E12/L (ref 3.8–5.2)
SODIUM SERPL-SCNC: 141 MMOL/L (ref 134–144)
TSH SERPL DL<=0.05 MIU/L-ACNC: 4.77 IU/ML (ref 0.34–5.6)
WBC # BLD AUTO: 8.4 10E9/L (ref 4–11)

## 2020-10-23 PROCEDURE — 36415 COLL VENOUS BLD VENIPUNCTURE: CPT | Mod: ZL | Performed by: FAMILY MEDICINE

## 2020-10-23 PROCEDURE — G0463 HOSPITAL OUTPT CLINIC VISIT: HCPCS | Mod: 25

## 2020-10-23 PROCEDURE — 90662 IIV NO PRSV INCREASED AG IM: CPT

## 2020-10-23 PROCEDURE — 99214 OFFICE O/P EST MOD 30 MIN: CPT | Performed by: FAMILY MEDICINE

## 2020-10-23 PROCEDURE — 85025 COMPLETE CBC W/AUTO DIFF WBC: CPT | Mod: ZL | Performed by: FAMILY MEDICINE

## 2020-10-23 PROCEDURE — 80053 COMPREHEN METABOLIC PANEL: CPT | Mod: ZL | Performed by: FAMILY MEDICINE

## 2020-10-23 PROCEDURE — 84443 ASSAY THYROID STIM HORMONE: CPT | Mod: ZL | Performed by: FAMILY MEDICINE

## 2020-10-23 ASSESSMENT — ENCOUNTER SYMPTOMS
COUGH: 0
NERVOUS/ANXIOUS: 0
FEVER: 0
CHILLS: 0
SHORTNESS OF BREATH: 0

## 2020-10-23 ASSESSMENT — MIFFLIN-ST. JEOR: SCORE: 1215.91

## 2020-10-23 ASSESSMENT — PAIN SCALES - GENERAL: PAINLEVEL: MILD PAIN (3)

## 2020-10-23 NOTE — PROGRESS NOTES
SUBJECTIVE:   Nursing Notes:   Katie Raman LPN  10/23/2020  1:38 PM  Sign at exiting of workspace  Patient presents to clinic with her  experiencing dizziness for past 2 weeks.  This occurs when standing up from sitting position and rolling over in bed.  Also, right arm pain.  Medication Reconciliation: complete    Katie Raman LPN        Princess Leone is a 80 year old female who presents to clinic today for a complaint of dizziness.    Has been very dizzy for the past couple of weeks.  She had fallen and had hit her right arm.  Has been going to Physical Therapy for that.  Last week, she was dizzy again while in the bathroom on her right upper arm.  Had a bad bruise in this area.  She fell into the side of her bathtub and landed again on her right arm, in the same area that she had injured previously.  According to our scale, she has lost 12 lb since her last visit on 9/1/2020.  X-ray of her right shoulder at that time was negative.  Doesn't have any palpitations, chest pain or shortness of breath.  No loss of consciousness with her falls.  Has even rolled over in bed and felt dizzy at times.  Other position changes don't seem to precipitate the dizziness.  Sometimes when she stands up quickly, needs to wait a moment before walking.  She has been trying to watch her diet.    HPI    I personally reviewed medications/allergies/history listed below:    Patient Active Problem List    Diagnosis Date Noted     Osteopenia, unspecified location 06/24/2019     Priority: Medium     Allergic rhinitis 01/24/2018     Priority: Medium     Retinal vascular occlusion 01/24/2018     Priority: Medium     Overview:   Left eye subtotal central retinal vein occlusion.       Depression 01/24/2018     Priority: Medium     Dysthymia 01/24/2018     Priority: Medium     Hearing loss 01/24/2018     Priority: Medium     Overview:   Mild       Hypothyroidism 01/24/2018     Priority: Medium     Symptomatic menopausal or  female climacteric states 01/24/2018     Priority: Medium     Abnormal glucose 01/24/2018     Priority: Medium     Vaginitis, atrophic 01/24/2018     Priority: Medium     CKD (chronic kidney disease) 11/02/2016     Priority: Medium     Vitamin D deficiency 03/19/2015     Priority: Medium     Cerebral lesion 05/17/2012     Priority: Medium     Intermittent atrial fibrillation (H) 05/17/2012     Priority: Medium     Other nonspecific abnormal result of function study of brain and central nervous system 04/24/2012     Priority: Medium     Cerebral artery occlusion with cerebral infarction (H) 04/06/2012     Priority: Medium     Viral upper respiratory tract infection 01/20/2012     Priority: Medium     Abnormal electrocardiogram 06/09/2011     Priority: Medium     Hyperlipidemia 05/03/2010     Priority: Medium     Carpal tunnel syndrome 11/24/2003     Priority: Medium     Past Medical History:   Diagnosis Date     Cerebral infarction (H)     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  MRI of brain after head CT showed multiple enhancing lesions, concerning for metastatic illness.  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  Referred to ENT and oncology 5/1/12.     Disorder of bone and cartilage 1/24/2018     Hypothyroidism     1993     Personal history of other medical treatment (CODE)     G3, P3-0-0-3 status post three NSVDs      Past Surgical History:   Procedure Laterality Date     COLONOSCOPY      approximately 2005 in the [facility] - was normal.     OTHER SURGICAL HISTORY      .0,(IA) SURGICAL PROCEDURE,N/A     OTHER SURGICAL HISTORY      55818.0,PAST SURGICAL HISTORY,None.     Family History   Problem Relation Age of Onset     Heart Disease Mother         Heart Disease,D & C at 91/Heart Disease,pacemaker     Hypertension Mother         Hypertension      Other - See Comments Father          at 76.  He had peripheral vascular disease. Bypass./COPD     Heart Disease Brother         Heart Disease, carotid disease and has gone through stenting     Other - See Comments Brother         tinnitis     Heart Disease Brother         Heart Disease,CABG x 3     Heart Surgery Son         Aortic Valve replacement due to bicuspid aortic valve.     Breast Cancer No family hx of         Cancer-breast     Social History     Tobacco Use     Smoking status: Never Smoker     Smokeless tobacco: Never Used   Substance Use Topics     Alcohol use: No     Social History     Social History Narrative    Retired from  teaching    3 grown kids and 5 grandkids    Lives with  in [facility]    Caffiene 1    Calcium good    Water some    She is retired.  She and her  relocated to the [facility] from [place], [place].      Kirk Son, 4/10/64      Sh    tiffanie Daughter, 67      Karen Daughter, 3/9/69       Brian -      Current Outpatient Medications   Medication Sig Dispense Refill     ARIPiprazole (ABILIFY) 10 MG tablet Take 0.5 tablets (5 mg) by mouth daily 30 tablet 1     Aspirin Buf,CaCarb-MgCarb-MgO, (BUFFERED ASPIRIN) 325 MG TABS Take 1 tablet by mouth daily       cholecalciferol (D 5000) 5000 UNITS CAPS Take one by mouth daily.  Have lab level of vitamin d rechecked after completion of 90 days.       levothyroxine (SYNTHROID/LEVOTHROID) 88 MCG tablet Take 1 tablet (88 mcg) by mouth daily 90 tablet 3     nortriptyline (PAMELOR) 25 MG capsule Take 1 capsule by mouth 2 times daily       other medical supplies Shingrix.  Diagnosis:  Z23. 1 each 0     simvastatin (ZOCOR) 10 MG tablet Take 1 tablet (10 mg) by mouth daily 90 tablet 3     No Known Allergies    Review of Systems   Constitutional: Negative for chills and fever.   Respiratory: Negative for cough and shortness of breath.    Cardiovascular: Negative for peripheral edema.   Psychiatric/Behavioral:  "Negative for mood changes. The patient is not nervous/anxious.         OBJECTIVE:     /84 (BP Location: Right arm, Patient Position: Sitting, Cuff Size: Adult Regular)   Pulse 100   Temp 97.8  F (36.6  C) (Tympanic)   Resp 16   Ht 1.613 m (5' 3.5\")   Wt 76.9 kg (169 lb 8 oz)   LMP  (LMP Unknown)   Breastfeeding No   BMI 29.55 kg/m    Body mass index is 29.55 kg/m .  Physical Exam  Constitutional:       Appearance: Normal appearance.   HENT:      Head: Normocephalic and atraumatic.      Ears:      Comments: TMs are retracted bilaterally.  Eyes:      Extraocular Movements: Extraocular movements intact.      Pupils: Pupils are equal, round, and reactive to light.   Neck:      Musculoskeletal: Normal range of motion and neck supple.   Cardiovascular:      Rate and Rhythm: Normal rate and regular rhythm.      Pulses: Normal pulses.      Heart sounds: No murmur.   Pulmonary:      Effort: Pulmonary effort is normal.      Breath sounds: Normal breath sounds. No wheezing, rhonchi or rales.   Musculoskeletal:      Right lower leg: No edema.      Left lower leg: No edema.      Comments: Right shoulder:  There is bruising of her upper arm.  She is able to abduct to 90 degrees actively, 180 degrees passively.  Neer's negative, Hawkin's negative.     Lymphadenopathy:      Cervical: No cervical adenopathy.   Neurological:      Mental Status: She is alert.   Psychiatric:         Mood and Affect: Mood normal.           PHQ-9 SCORE 3/11/2019 6/24/2019 7/16/2020   PHQ-9 Total Score 0 0 0       PHQ-2 Score:     PHQ-2 ( 1999 [employer]) 9/1/2020 7/16/2020   Q1: Little interest or pleasure in doing things 0 0   Q2: Feeling down, depressed or hopeless 0 0   PHQ-2 Score 0 0       ANGIE-7 SCORE 6/24/2019   Total Score 0           I personally reviewed results withpatient as listed below:   Diagnostic Test Results:  none     ASSESSMENT/PLAN:       ICD-10-CM    1. Dizziness  R42 CTA Head Neck with Contrast     TSH Reflex GH     " Comprehensive metabolic panel     CBC with platelets differential     Echocardiogram Complete     Zio Patch Holter Adult Pediatric Greater than 48 hrs     TSH Reflex GH     Comprehensive metabolic panel     CBC with platelets differential     MR Brain w/o & w Contrast     CANCELED: MR Brain w/o Contrast   2. Recurrent falls  R29.6 Comprehensive metabolic panel     CBC with platelets differential     Echocardiogram Complete     Comprehensive metabolic panel     CBC with platelets differential   3. Right shoulder pain, unspecified chronicity  M25.511    4. Needs flu shot  Z23 GH IMM-  FLU VACCINE, INCREASED ANTIGEN, PRESV FREE       1.  Labs completed as above.  Zio patch, CT angiogram of the head and neck, MRI brain and echo ordered to evaluate for possible brain related and cardiac causes for her dizziness and falls.  Also could consider physical therapy for vestibular rehab as well.  2.  See #1.  3.  Shoulder appears to be bruised.  She has fairly good range of motion.  She is already going to physical therapy.  Continue with this.  Consider MRI if stalling in progress.  4.  High dose flu shot given today.    Linda Moreno MD  [facility]    Portions of this dictation were created using the Dragon Nuance voice recognition system. Proofreading was completed but there may be errors in text.

## 2020-10-23 NOTE — NURSING NOTE
Patient presents to clinic with her  experiencing dizziness for past 2 weeks.  This occurs when standing up from sitting position and rolling over in bed.  Also, right arm pain.  Medication Reconciliation: complete    Katie Raman LPN

## 2020-10-24 ENCOUNTER — HOSPITAL ENCOUNTER (OUTPATIENT)
Dept: MRI IMAGING | Facility: OTHER | Age: 80
Discharge: HOME | End: 2020-10-24
Attending: FAMILY MEDICINE | Admitting: FAMILY MEDICINE
Payer: MEDICARE

## 2020-10-24 DIAGNOSIS — R42 DIZZINESS: ICD-10-CM

## 2020-10-24 PROCEDURE — 70553 MRI BRAIN STEM W/O & W/DYE: CPT

## 2020-10-24 PROCEDURE — A9575 INJ GADOTERATE MEGLUMI 0.1ML: HCPCS | Performed by: FAMILY MEDICINE

## 2020-10-24 PROCEDURE — 255N000002 HC RX 255 OP 636: Performed by: FAMILY MEDICINE

## 2020-10-24 RX ADMIN — GADOTERATE MEGLUMINE 16 ML: 376.9 INJECTION INTRAVENOUS at 13:15

## 2020-10-26 ENCOUNTER — HOSPITAL ENCOUNTER (OUTPATIENT)
Dept: PHYSICAL THERAPY | Facility: OTHER | Age: 80
Setting detail: THERAPIES SERIES
End: 2020-10-26
Attending: FAMILY MEDICINE
Payer: MEDICARE

## 2020-10-26 PROCEDURE — 97110 THERAPEUTIC EXERCISES: CPT | Mod: GP

## 2020-10-28 ENCOUNTER — HOSPITAL ENCOUNTER (OUTPATIENT)
Dept: PHYSICAL THERAPY | Facility: OTHER | Age: 80
Setting detail: THERAPIES SERIES
End: 2020-10-28
Attending: FAMILY MEDICINE
Payer: MEDICARE

## 2020-10-28 PROCEDURE — 97110 THERAPEUTIC EXERCISES: CPT | Mod: GP

## 2020-10-29 ENCOUNTER — TELEPHONE (OUTPATIENT)
Dept: FAMILY MEDICINE | Facility: OTHER | Age: 80
End: 2020-10-29

## 2020-10-29 DIAGNOSIS — Z86.73 HISTORY OF CVA (CEREBROVASCULAR ACCIDENT): ICD-10-CM

## 2020-10-29 DIAGNOSIS — R29.6 RECURRENT FALLS: Primary | ICD-10-CM

## 2020-10-29 NOTE — TELEPHONE ENCOUNTER
The DX of dizziness is not a covered dx for the echo you ordered per her insurance.  Please send me a covered dx.     Thank You,  Samantha Del Rio on 10/29/2020 at 3:01 PM

## 2020-10-30 NOTE — TELEPHONE ENCOUNTER
She has had a history of CVA in the past.  I reordered the echocardiogram and looks like it went through with that diagnosis.  Linda Moreno MD

## 2020-11-03 ENCOUNTER — TELEPHONE (OUTPATIENT)
Dept: FAMILY MEDICINE | Facility: OTHER | Age: 80
End: 2020-11-03

## 2020-11-03 DIAGNOSIS — Z01.812 PRE-PROCEDURE LAB EXAM: Primary | ICD-10-CM

## 2020-11-03 NOTE — TELEPHONE ENCOUNTER
Patient has CT with contrast scheduled for 11/10. Unsure what else needs to be ordered. Patient just has labs completed on 10/23/20.     Rosa Reese LPN on 11/3/2020 at 2:04 PM

## 2020-11-04 ENCOUNTER — MEDICAL CORRESPONDENCE (OUTPATIENT)
Dept: HEALTH INFORMATION MANAGEMENT | Facility: OTHER | Age: 80
End: 2020-11-04

## 2020-11-04 DIAGNOSIS — Z79.899 NEED FOR PROPHYLACTIC CHEMOTHERAPY: Primary | ICD-10-CM

## 2020-11-04 DIAGNOSIS — Z01.812 PRE-PROCEDURE LAB EXAM: ICD-10-CM

## 2020-11-04 LAB
CREAT SERPL-MCNC: 1.18 MG/DL (ref 0.6–1.2)
GFR SERPL CREATININE-BSD FRML MDRD: 44 ML/MIN/{1.73_M2}

## 2020-11-04 PROCEDURE — 36415 COLL VENOUS BLD VENIPUNCTURE: CPT | Mod: ZL | Performed by: FAMILY MEDICINE

## 2020-11-04 PROCEDURE — 82565 ASSAY OF CREATININE: CPT | Mod: ZL | Performed by: FAMILY MEDICINE

## 2020-11-04 PROCEDURE — 80335 ANTIDEPRESSANT TRICYCLIC 1/2: CPT | Mod: ZL

## 2020-11-10 ENCOUNTER — HOSPITAL ENCOUNTER (OUTPATIENT)
Dept: CARDIOLOGY | Facility: OTHER | Age: 80
Discharge: HOME | End: 2020-11-10
Attending: FAMILY MEDICINE | Admitting: FAMILY MEDICINE
Payer: MEDICARE

## 2020-11-10 DIAGNOSIS — R42 DIZZINESS: ICD-10-CM

## 2020-11-10 PROCEDURE — 0296T LEADLESS EKG MONITOR 3 TO 14 DAYS: CPT

## 2020-11-10 PROCEDURE — 999N000157 HC STATISTIC RCP TIME EA 10 MIN

## 2020-11-10 PROCEDURE — 0298T LEADLESS EKG MONITOR 3 TO 14 DAYS: CPT | Performed by: INTERNAL MEDICINE

## 2020-11-10 NOTE — PATIENT INSTRUCTIONS
Patient instructed not to:   -take baths, swim, sauna   -remove device prior to 14 days   -use electric blankets   -shower or sweat excessively within first 24 hours of device application  Patient instructed to:   -shower as needed   -be carefull when toweling off and dressing   -press button when cardiac symptoms occur   -document symptoms in log book   -remove and return device (send via mail to [facility])   -Call [employer] with any questions

## 2020-11-11 LAB — NORTRIP SERPL-MCNC: 69 NG/ML (ref 50–150)

## 2020-12-07 ENCOUNTER — TELEPHONE (OUTPATIENT)
Dept: FAMILY MEDICINE | Facility: OTHER | Age: 80
End: 2020-12-07

## 2020-12-08 ENCOUNTER — HOSPITAL ENCOUNTER (OUTPATIENT)
Dept: PHYSICAL THERAPY | Facility: OTHER | Age: 80
Setting detail: THERAPIES SERIES
End: 2020-12-08
Attending: FAMILY MEDICINE
Payer: MEDICARE

## 2020-12-08 PROCEDURE — 97110 THERAPEUTIC EXERCISES: CPT | Mod: GP

## 2020-12-09 NOTE — PROGRESS NOTES
[facility]      OUTPATIENT PHYSICAL THERAPY  PLAN OF TREATMENT FOR OUTPATIENT REHABILITATION    Patient's Last Name, First Name, M.I.                YOB: 1940  Princess Leone                        Provider's Name  [facility] Medical Record No.  [id number]                               Onset Date: 8/1/2020   Start of Care Date: 9/23/2020   Type:     _X_PT   ___OT   ___SLP Medical Diagnosis: Impingement syndrome, shoulder, right M75.41                        PT Diagnosis: Impingement syndrome, shoulder, right M75.41       _________________________________________________________________________________  Plan of Treatment:    Frequency/Duration: 1-2 visits x 8 weeks     Goals:  Goal Identifier Pain    Goal Description Report pain at 2/10 or less to allow lifting a coffee cup without difficulty    Target Date 11/18/20   Date Met  12/08/20   Progress:  Goal Met       Goal Identifier ROM    Goal Description Demonstrate right shoulder flexion to 140 degrees to allow reaching to an overhead shelf without difficulty.    Target Date 11/18/20   Date Met   12/8/2020    Progress: Goal Met       Goal Identifier Weakness   Goal Description Improve strength to 4/5 shoulder flexion to allow lifting a one pound object to an overhead shelf   Target Date 11/18/20   Date Met  12/08/20   Progress: Goal met       Progress Toward Goals:   Progress this reporting period: Patient has currently met PT goals. She will proceed with independent HEP and management.     Plan:  Continue therapy per current plan of care.  She request to keep her chart open for 3 weeks, in case she requires follow up for HEP progression.       Certification date from 11/8/2020 to 1/3/2020 .    Roe Rasheed, PT          I CERTIFY THE NEED FOR THESE SERVICES FURNISHED UNDER        THIS PLAN OF TREATMENT AND WHILE UNDER MY CARE      (Physician co-signature of this document indicates review and certification of the therapy plan).                Referring Provider: Dr. Moreno

## 2020-12-09 NOTE — PROGRESS NOTES
Outpatient Physical Therapy Progress Note     Patient: Princess Leone  : 1940    Beginning/End Dates of Reporting Period:  2020 to 2020    Referring Provider: Dr. Moreno    Therapy Diagnosis: Impingement syndrome, shoulder, right M75.41      Client Self Report: Patient states the right shoulder is 80% better.  She reports that she can use the right shoulder for all ADL's and house tasks at this time.  She would liek to begin a trial of independent HEP and management.       Objective Measurements:  Objective Measure: Right shoulder pain   Details: 0/10     Objective Measure: Right shoulder ROM  Details:   Active flexion= 140,    Vptrciorw=670,    Reach behind the head to C7,   Reach behind the back to L1.     Objective Measure: Right shoulder strength  Details:   Flexion=4+/5,    Abduction=4/5,    IR=5/5,    ER=4+/5    Goals:  Goal Identifier Pain    Goal Description Report pain at 2/10 or less to allow lifting a coffee cup without difficulty    Target Date 20   Date Met  20   Progress:  Goal Met      Goal Identifier ROM    Goal Description Demonstrate right shoulder flexion to 140 degrees to allow reaching to an overhead shelf without difficulty.    Target Date 20   Date Met   2020    Progress: Goal Met      Goal Identifier Weakness   Goal Description Improve strength to 4/5 shoulder flexion to allow lifting a one pound object to an overhead shelf   Target Date 20   Date Met  20   Progress: Goal met      Progress Toward Goals:   Progress this reporting period: Patient has currently met PT goals. She will proceed with independent HEP and management.    Plan:  Continue therapy per current plan of care.  She request to keep her chart open for 3 weeks, in case she requires follow up for HEP progression.

## 2021-02-17 ENCOUNTER — HOSPITAL ENCOUNTER (OUTPATIENT)
Dept: MAMMOGRAPHY | Facility: OTHER | Age: 81
Discharge: HOME | End: 2021-02-17
Attending: FAMILY MEDICINE | Admitting: FAMILY MEDICINE
Payer: MEDICARE

## 2021-02-17 DIAGNOSIS — Z12.31 VISIT FOR SCREENING MAMMOGRAM: ICD-10-CM

## 2021-02-17 PROCEDURE — 77063 BREAST TOMOSYNTHESIS BI: CPT

## 2021-03-26 NOTE — PROGRESS NOTES
Outpatient Physical Therapy Discharge Note     Patient: Princess Leone  : 1940    Beginning/End Dates of Reporting Period:  2020 to 2020    Referring Provider: Dr. Moreno     Therapy Diagnosis: Impingement syndrome, shoulder, right M75.41      Client Self Report on 2020: Patient states the right shoulder is 80% better.  She reports that she can use the right shoulder for all ADL's and house tasks at this time.  She would liek to begin a trial of independent HEP and management.       Objective Measurements: see progress note on 2020 for status at time of final visit.     Goals: see progress note on 2020 for status at time of final visit.       Plan:  Discharge from therapy.    Discharge:    Reason for Discharge: Patient has met all goals.      Discharge Plan: Patient to continue home program.

## 2021-04-20 ENCOUNTER — TRANSFERRED RECORDS (OUTPATIENT)
Dept: HEALTH INFORMATION MANAGEMENT | Facility: OTHER | Age: 81
End: 2021-04-20

## 2021-06-02 ENCOUNTER — MYC MEDICAL ADVICE (OUTPATIENT)
Dept: FAMILY MEDICINE | Facility: OTHER | Age: 81
End: 2021-06-02

## 2021-06-02 ENCOUNTER — NURSE TRIAGE (OUTPATIENT)
Dept: FAMILY MEDICINE | Facility: OTHER | Age: 81
End: 2021-06-02

## 2021-06-02 NOTE — TELEPHONE ENCOUNTER
Call to patient, verified name/.     S-(situation): minimal rectal bleeding, about 4 months    B-(background):  80 yo female.   My chart message received from spouse.   Spouse and pt requesting a colonoscopy.     A-(assessment):   Onset: about 4 month, noticing red blood streaks on toilet tissue following most bowl movements.   BM is about 5 days or longer.     Baby ASA. (+)    Denies: abdominal pain, dizziness, CP, SOB,fever, blood clots, black tarry stool. Denies visible blood in toilet bowel, vomiting, diarrhea.   Denies history of hemorrhoids, AAA.     No medication for constipation on file.    R-(recommendations): per protocol, OV within 2 weeks. Pt agrees with plan. Transferred to scheduled.  Reviewed to contact clinic or go to ER if bleeding worsens, developed, dizziness, SOB, CP.     * Call from scheduling soonest appointment is 21, pt does not want to see anyone else. Pt added to schedule with encounter routed to provider for recommendations.       Reason for Disposition    Rectal bleeding is minimal (e.g., blood just on toilet paper, a few drops in toilet bowl)    Additional Information    Negative: Passed out (i.e., fainted, collapsed and was not responding)    Negative: Shock suspected (e.g., cold/pale/clammy skin, too weak to stand, low BP, rapid pulse)    Negative: Vomiting red blood or black (coffee ground) material    Negative: Sounds like a life-threatening emergency to the triager    Negative: Diarrhea is the main symptom    Negative: Rectal symptoms    Negative: SEVERE rectal bleeding (large blood clots; on and off, or constant bleeding)    Negative: SEVERE dizziness (e.g., unable to stand, requires support to walk, feels like passing out now)    Negative: MODERATE rectal bleeding (small blood clots, passing blood without stool, or toilet water turns red) more than once a day    Negative: Bloody, black, or tarry bowel movements (Exception: chronic-unchanged  black-grey bowel movements and  "is taking iron pills or Pepto-bismol)    Negative: High-risk adult (e.g., prior surgery on aorta, abdominal aortic aneurysm)    Negative: Rectal foreign body (inserted or swallowed)    Negative: SEVERE abdominal pain (e.g., excruciating)    Negative: Constant abdominal pain lasting > 2 hours    Negative: Pale skin (pallor) of new onset or worsening    Negative: Patient sounds very sick or weak to the triager    Negative: MODERATE rectal bleeding (small blood clots, passing blood without stool, or toilet water turns red)    Negative: Taking Coumadin (warfarin) or other strong blood thinner, or known bleeding disorder (e.g., thrombocytopenia)    Negative: Colonoscopy in past 72 hours    Negative: Known cirrhosis of the liver (or history of liver failure or ascites)    Negative: Patient wants to be seen    Negative: MILD rectal bleeding (more than just a few drops or streaks)    Negative: Cancer of rectum or intestines (colon)    Negative: Radiation therapy to lower abdomen or pelvis    Negative: Normal formed BM with a few streaks or drops of blood on surface of BM    Answer Assessment - Initial Assessment Questions  1. APPEARANCE of BLOOD: \"What color is it?\" \"Is it passed separately, on the surface of the stool, or mixed in with the stool?\"       Wiping, toilet tissue.   2. AMOUNT: \"How much blood was passed?\"       Streaks on toilet tissue.    3. FREQUENCY: \"How many times has blood been passed with the stools?\"       After bowel movement, -just about every BM  4. ONSET: \"When was the blood first seen in the stools?\" (Days or weeks)       About 4 months ago.   5. DIARRHEA: \"Is there also some diarrhea?\" If so, ask: \"How many diarrhea stools were passed in past 24 hours?        denies  6. CONSTIPATION: \"Do you have constipation?\" If so, \"How bad is it?\"      Denies straining, 5 days between BM- or longer   7. RECURRENT SYMPTOMS: \"Have you had blood in your stools before?\" If so, ask: \"When was the last time?\" and " "\"What happened that time?\"       Just started noticing.   8. BLOOD THINNERS: \"Do you take any blood thinners?\" (e.g., Coumadin/warfarin, Pradaxa/dabigatran, aspirin)      Aspirin, Baby ASA   9. OTHER SYMPTOMS: \"Do you have any other symptoms?\"  (e.g., abdominal pain, vomiting, dizziness, fever, CP, SOB)      denies  10. PREGNANCY: \"Is there any chance you are pregnant?\" \"When was your last menstrual period?\"        n/a    Protocols used: RECTAL BLEEDING-ANUM-MALDONADO Rodrigues RN ,....................  6/2/2021   11:07 AM    "

## 2021-06-02 NOTE — TELEPHONE ENCOUNTER
Refer to triage encounter, same day.  Hodan Rodrigues RN ,....................  6/2/2021   10:57 AM

## 2021-06-03 NOTE — TELEPHONE ENCOUNTER
Noted.  Appointment is scheduled on 6/28/2021.  You may offer her a hold spot if she would like to be seen sooner.  Linda Moreno MD

## 2021-06-03 NOTE — TELEPHONE ENCOUNTER
Will see patient on June 8 th at 11:20 am 20 minutes for rectal bleeding . Cely Wiseman LPN ....................6/3/2021  1:41 PM

## 2021-06-08 ENCOUNTER — OFFICE VISIT (OUTPATIENT)
Dept: FAMILY MEDICINE | Facility: OTHER | Age: 81
End: 2021-06-08
Attending: FAMILY MEDICINE
Payer: MEDICARE

## 2021-06-08 VITALS
DIASTOLIC BLOOD PRESSURE: 74 MMHG | RESPIRATION RATE: 16 BRPM | WEIGHT: 176.8 LBS | SYSTOLIC BLOOD PRESSURE: 130 MMHG | OXYGEN SATURATION: 98 % | HEART RATE: 94 BPM | BODY MASS INDEX: 30.83 KG/M2 | TEMPERATURE: 97.8 F

## 2021-06-08 DIAGNOSIS — K64.4 EXTERNAL HEMORRHOIDS: ICD-10-CM

## 2021-06-08 DIAGNOSIS — F32.A DEPRESSION, UNSPECIFIED DEPRESSION TYPE: ICD-10-CM

## 2021-06-08 DIAGNOSIS — K62.5 RECTAL BLEEDING: Primary | ICD-10-CM

## 2021-06-08 DIAGNOSIS — R94.31 ABNORMAL ELECTROCARDIOGRAM: Primary | ICD-10-CM

## 2021-06-08 DIAGNOSIS — E03.9 HYPOTHYROIDISM, UNSPECIFIED TYPE: ICD-10-CM

## 2021-06-08 DIAGNOSIS — K62.9 RECTAL LESION: ICD-10-CM

## 2021-06-08 LAB
ANION GAP SERPL CALCULATED.3IONS-SCNC: 8 MMOL/L (ref 3–14)
BASOPHILS # BLD AUTO: 0.1 10E9/L (ref 0–0.2)
BASOPHILS NFR BLD AUTO: 1 %
BUN SERPL-MCNC: 18 MG/DL (ref 7–25)
CALCIUM SERPL-MCNC: 9.3 MG/DL (ref 8.6–10.3)
CHLORIDE SERPL-SCNC: 106 MMOL/L (ref 98–107)
CO2 SERPL-SCNC: 27 MMOL/L (ref 21–31)
CREAT SERPL-MCNC: 1.24 MG/DL (ref 0.6–1.2)
DIFFERENTIAL METHOD BLD: NORMAL
EOSINOPHIL # BLD AUTO: 0.2 10E9/L (ref 0–0.7)
EOSINOPHIL NFR BLD AUTO: 3.4 %
ERYTHROCYTE [DISTWIDTH] IN BLOOD BY AUTOMATED COUNT: 12.5 % (ref 10–15)
GFR SERPL CREATININE-BSD FRML MDRD: 42 ML/MIN/{1.73_M2}
GLUCOSE SERPL-MCNC: 116 MG/DL (ref 70–105)
HCT VFR BLD AUTO: 44.8 % (ref 35–47)
HGB BLD-MCNC: 14.8 G/DL (ref 11.7–15.7)
IMM GRANULOCYTES # BLD: 0 10E9/L (ref 0–0.4)
IMM GRANULOCYTES NFR BLD: 0.3 %
INR PPP: 0.97 (ref 0.86–1.14)
INTERPRETATION ECG - MUSE: NORMAL
LYMPHOCYTES # BLD AUTO: 2.7 10E9/L (ref 0.8–5.3)
LYMPHOCYTES NFR BLD AUTO: 37.3 %
MCH RBC QN AUTO: 30.8 PG (ref 26.5–33)
MCHC RBC AUTO-ENTMCNC: 33 G/DL (ref 31.5–36.5)
MCV RBC AUTO: 93 FL (ref 78–100)
MONOCYTES # BLD AUTO: 0.5 10E9/L (ref 0–1.3)
MONOCYTES NFR BLD AUTO: 6.6 %
NEUTROPHILS # BLD AUTO: 3.7 10E9/L (ref 1.6–8.3)
NEUTROPHILS NFR BLD AUTO: 51.4 %
PLATELET # BLD AUTO: 301 10E9/L (ref 150–450)
POTASSIUM SERPL-SCNC: 3.8 MMOL/L (ref 3.5–5.1)
RBC # BLD AUTO: 4.81 10E12/L (ref 3.8–5.2)
SODIUM SERPL-SCNC: 141 MMOL/L (ref 134–144)
TSH SERPL DL<=0.05 MIU/L-ACNC: 5.33 IU/ML (ref 0.34–5.6)
WBC # BLD AUTO: 7.2 10E9/L (ref 4–11)

## 2021-06-08 PROCEDURE — 99214 OFFICE O/P EST MOD 30 MIN: CPT | Performed by: FAMILY MEDICINE

## 2021-06-08 PROCEDURE — G0463 HOSPITAL OUTPT CLINIC VISIT: HCPCS

## 2021-06-08 PROCEDURE — 93010 ELECTROCARDIOGRAM REPORT: CPT | Performed by: INTERNAL MEDICINE

## 2021-06-08 PROCEDURE — 999N001035 HC STATISTIC THROMBIN TIME NC: Mod: ZL | Performed by: FAMILY MEDICINE

## 2021-06-08 PROCEDURE — 85732 THROMBOPLASTIN TIME PARTIAL: CPT | Mod: ZL | Performed by: FAMILY MEDICINE

## 2021-06-08 PROCEDURE — 84443 ASSAY THYROID STIM HORMONE: CPT | Mod: ZL | Performed by: FAMILY MEDICINE

## 2021-06-08 PROCEDURE — 85610 PROTHROMBIN TIME: CPT | Mod: ZL | Performed by: FAMILY MEDICINE

## 2021-06-08 PROCEDURE — 80048 BASIC METABOLIC PNL TOTAL CA: CPT | Mod: ZL | Performed by: FAMILY MEDICINE

## 2021-06-08 PROCEDURE — 85025 COMPLETE CBC W/AUTO DIFF WBC: CPT | Mod: ZL | Performed by: FAMILY MEDICINE

## 2021-06-08 PROCEDURE — 36415 COLL VENOUS BLD VENIPUNCTURE: CPT | Mod: ZL | Performed by: FAMILY MEDICINE

## 2021-06-08 PROCEDURE — 93005 ELECTROCARDIOGRAM TRACING: CPT

## 2021-06-08 ASSESSMENT — ENCOUNTER SYMPTOMS
FEVER: 0
ANAL BLEEDING: 1
ABDOMINAL PAIN: 0
FATIGUE: 0
CONSTIPATION: 0
DIARRHEA: 0
HEMATOCHEZIA: 0
COUGH: 0
SHORTNESS OF BREATH: 0
RECTAL PAIN: 0

## 2021-06-08 ASSESSMENT — PAIN SCALES - GENERAL: PAINLEVEL: NO PAIN (0)

## 2021-06-08 ASSESSMENT — PATIENT HEALTH QUESTIONNAIRE - PHQ9: SUM OF ALL RESPONSES TO PHQ QUESTIONS 1-9: 3

## 2021-06-08 NOTE — NURSING NOTE
"Patient presents to clinic for follow up of rectal bleeding.  Chief Complaint   Patient presents with     RECHECK     Rectal bleeding       Initial /74 (BP Location: Right arm, Patient Position: Sitting, Cuff Size: Adult Regular)   Pulse 94   Temp 97.8  F (36.6  C) (Tympanic)   Resp 16   Wt 80.2 kg (176 lb 12.8 oz)   SpO2 98%   Breastfeeding No   BMI 30.83 kg/m   Estimated body mass index is 30.83 kg/m  as calculated from the following:    Height as of 10/23/20: 1.613 m (5' 3.5\").    Weight as of this encounter: 80.2 kg (176 lb 12.8 oz).  Medication Reconciliation: complete    Silva Noland LPN    "

## 2021-06-08 NOTE — PROGRESS NOTES
SUBJECTIVE:   There are no exam notes on file for this visit.    Princess Leone is a 81 year old female who presents to clinic today for discussion of rectal bleeding.  The bleeding is happening after bowel movements.  Only happens with wiping.  No blood coming out in the toilet.  Had hemorrhoids years ago after childbirth, but doesn't think she has had these lately.  No belly pain.  No constipation or diarrhea.  Normal appetite.  No nausea/vomiting.  No weight loss.  She has noticed this with every bowel movement.  This has been going on for 3-4 months.    She had seen her Psychiatrist, Dr. Cooper recently.  Due to the medications she is on, he had wanted her to have an EKG and have the results faxed to him.    HPI    I personally reviewed medications/allergies/history listed below:    Patient Active Problem List    Diagnosis Date Noted     Osteopenia, unspecified location 06/24/2019     Priority: Medium     Allergic rhinitis 01/24/2018     Priority: Medium     Retinal vascular occlusion 01/24/2018     Priority: Medium     Overview:   Left eye subtotal central retinal vein occlusion.       Depression 01/24/2018     Priority: Medium     Dysthymia 01/24/2018     Priority: Medium     Hearing loss 01/24/2018     Priority: Medium     Overview:   Mild       Hypothyroidism 01/24/2018     Priority: Medium     Symptomatic menopausal or female climacteric states 01/24/2018     Priority: Medium     Abnormal glucose 01/24/2018     Priority: Medium     Vaginitis, atrophic 01/24/2018     Priority: Medium     CKD (chronic kidney disease) 11/02/2016     Priority: Medium     Vitamin D deficiency 03/19/2015     Priority: Medium     Cerebral lesion 05/17/2012     Priority: Medium     Intermittent atrial fibrillation (H) 05/17/2012     Priority: Medium     Other nonspecific abnormal result of function study of brain and central nervous system 04/24/2012     Priority: Medium     Cerebral artery occlusion with cerebral infarction  (H) 2012     Priority: Medium     Viral upper respiratory tract infection 2012     Priority: Medium     Abnormal electrocardiogram 2011     Priority: Medium     Hyperlipidemia 2010     Priority: Medium     Carpal tunnel syndrome 2003     Priority: Medium     Past Medical History:   Diagnosis Date     Cerebral infarction (H)     12,left parietal CVA with expressive aphasia.  MRI of brain after head CT showed multiple enhancing lesions, concerning for metastatic illness.  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  Referred to ENT and oncology 12.     Disorder of bone and cartilage 2018     Hypothyroidism          Personal history of other medical treatment (CODE)     G3, P3-0-0-3 status post three NSVDs      Past Surgical History:   Procedure Laterality Date     [place] [facility] [place]     OTHER SURGICAL HISTORY      .0,(IA) SURGICAL PROCEDURE,N/A     OTHER SURGICAL HISTORY      22869.0,PAST SURGICAL HISTORY,None.     Family History   Problem Relation Age of Onset     Heart Disease Mother         Heart Disease,D & C at 91/Heart Disease,pacemaker     Hypertension Mother         Hypertension     Other - See Comments Father          at 76.  He had peripheral vascular disease. Bypass./COPD     Heart Disease Brother         Heart Disease, carotid disease and has gone through stenting     Other - See Comments Brother         tinnitis     Heart Disease Brother         Heart Disease,CABG x 3     Heart Surgery Son         Aortic Valve replacement due to bicuspid aortic valve.     Breast Cancer No family hx of         Cancer-breast     Social History     Tobacco Use     Smoking status: Never Smoker     Smokeless tobacco: Never Used   Substance Use Topics     Alcohol use: No      Social History     Social History Narrative    Retired from  teaching    3 grown kids and 5 grandkids    Lives with  in [facility]    Caffiene 1    Calcium good    Water some    She is retired.  She and her  relocated to the [facility] from [place], [place].      Kirk Son, 4/10/64      Sh    tiffanie Daughter, 9/8/67      Karen Daughter, 3/9/69       Brian -      Current Outpatient Medications   Medication Sig Dispense Refill     ARIPiprazole (ABILIFY) 10 MG tablet Take 0.5 tablets (5 mg) by mouth daily 30 tablet 1     Aspirin Buf,CaCarb-MgCarb-MgO, (BUFFERED ASPIRIN) 325 MG TABS Take 1 tablet by mouth daily       cholecalciferol (D 5000) 5000 UNITS CAPS Take one by mouth daily.  Have lab level of vitamin d rechecked after completion of 90 days.       levothyroxine (SYNTHROID/LEVOTHROID) 88 MCG tablet Take 1 tablet (88 mcg) by mouth daily 90 tablet 3     nortriptyline (PAMELOR) 25 MG capsule Take 1 capsule by mouth 2 times daily       other medical supplies Shingrix.  Diagnosis:  Z23. 1 each 0     simvastatin (ZOCOR) 10 MG tablet Take 1 tablet (10 mg) by mouth daily 90 tablet 3     No Known Allergies    Review of Systems   Constitutional: Negative for fatigue and fever.   Respiratory: Negative for cough and shortness of breath.    Cardiovascular: Negative for peripheral edema.   Gastrointestinal: Positive for anal bleeding. Negative for abdominal pain, constipation, diarrhea, hematochezia and rectal pain.        OBJECTIVE:     /74 (BP Location: Right arm, Patient Position: Sitting, Cuff Size: Adult Regular)   Pulse 94   Temp 97.8  F (36.6  C) (Tympanic)   Resp 16   Wt 80.2 kg (176 lb 12.8 oz)   SpO2 98%   Breastfeeding No   BMI 30.83 kg/m    Body mass index is 30.83 kg/m .  Physical Exam  Constitutional:       Appearance: Normal appearance.   HENT:      Head: Normocephalic.   Eyes:      Extraocular Movements: Extraocular movements intact.      Pupils: Pupils are  equal, round, and reactive to light.   Neck:      Musculoskeletal: Normal range of motion and neck supple.   Cardiovascular:      Rate and Rhythm: Normal rate and regular rhythm.      Pulses: Normal pulses.      Heart sounds: Normal heart sounds. No murmur.   Pulmonary:      Effort: Pulmonary effort is normal.      Breath sounds: Normal breath sounds. No wheezing, rhonchi or rales.   Abdominal:      General: Abdomen is flat. Bowel sounds are normal.      Tenderness: There is no abdominal tenderness. There is no guarding or rebound.   Genitourinary:     Comments: Anus:  She does have extensive external hemorrhoids and looks to have a possible fissure around 11:00.  In addition, she does have a red lesion at about 12-1:00 protruding slightly from the anus.  Musculoskeletal:      Right lower leg: No edema.   Lymphadenopathy:      Cervical: No cervical adenopathy.   Neurological:      Mental Status: She is alert.           PHQ-9 SCORE 6/24/2019 7/16/2020 6/8/2021   PHQ-9 Total Score 0 0 3         ANGIE-7 SCORE 6/24/2019   Total Score 0         I personally reviewed results withpatient as listed below:   Diagnostic Test Results:  none     ASSESSMENT/PLAN:       ICD-10-CM    1. Rectal bleeding  K62.5 CBC with platelets differential     PTT mixing studies     INR     Basic Metabolic Panel     GASTROENTEROLOGY ADULT REF PROCEDURE ONLY     Basic Metabolic Panel     INR     PTT mixing studies     CBC with platelets differential   2. External hemorrhoids  K64.4    3. Rectal lesion  K62.9 GASTROENTEROLOGY ADULT REF PROCEDURE ONLY   4. Depression, unspecified depression type  F32.9 EKG 12-lead, tracing only (Same Day)   5. Hypothyroidism, unspecified type  E03.9 TSH Reflex GH     TSH Reflex GH       1.  She vale have some hemorrhoids and possible fissure as well.  In addition, there is a red lesion at the anus.  Will refer to general surgery for colonoscopy/further evaluation.  2.  See #1.  3.  See #1.  4.  EKG completed today as  above and will be faxed to Dr. Cooper.  5.  TSH today as above.    Linda Moreno MD  [facility]

## 2021-06-09 ENCOUNTER — TELEPHONE (OUTPATIENT)
Dept: SURGERY | Facility: OTHER | Age: 81
End: 2021-06-09

## 2021-06-09 DIAGNOSIS — K62.5 RECTAL BLEEDING: Primary | ICD-10-CM

## 2021-06-09 LAB
APTT IMM NP PPP: 45 SEC (ref 31–45)
APTT IMM NP PPP: NORMAL SEC (ref 31–45)

## 2021-06-09 RX ORDER — POLYETHYLENE GLYCOL 3350, SODIUM CHLORIDE, SODIUM BICARBONATE, POTASSIUM CHLORIDE 420; 11.2; 5.72; 1.48 G/4L; G/4L; G/4L; G/4L
4000 POWDER, FOR SOLUTION ORAL ONCE
Qty: 4000 ML | Refills: 0 | Status: SHIPPED | OUTPATIENT
Start: 2021-06-09 | End: 2021-06-09

## 2021-06-09 RX ORDER — BISACODYL 5 MG
TABLET, DELAYED RELEASE (ENTERIC COATED) ORAL
Qty: 2 TABLET | Refills: 0 | Status: ON HOLD | OUTPATIENT
Start: 2021-06-09 | End: 2021-06-28

## 2021-06-09 NOTE — TELEPHONE ENCOUNTER
Screening Questions for the Scheduling of Screening Colonoscopies   (If Colonoscopy is diagnostic, Provider should review the chart before scheduling.)  Are you younger than 50 or older than 80?   YES   Do you take aspirin or fish oil?  YES - BOTH   (if yes, tell patient to stop 1 week prior to Colonoscopy)  Do you take warfarin (Coumadin), clopidogrel (Plavix), apixaban (Eliquis), dabigatram (Pradaxa), rivaroxaban (Xarelto) or any blood thinner? NO   Do you use oxygen at home?  NO   Do you have kidney disease? NO   Are you on dialysis? NO   Have you had a stroke or heart attack in the last year? NO   Have you had a stent in your heart or any blood vessel in the last year? NO   Have you had a transplant of any organ? NO   Have you had a colonoscopy or upper endoscopy (EGD) before? YES          When?  OVER 10 YRS   Date of scheduled Colonoscopy. 06/28/2021  Provider ERICH   Pharmacy [facility]

## 2021-06-09 NOTE — TELEPHONE ENCOUNTER
Patient referred by Dr. Rodolfo Stern for a diagnostic colonoscopy ,   Diagnosis is rectal bleeding and rectal lesion.   Please advise if ok to schedule.   Thank you.Selina Watson on 6/9/2021 at 8:21 AM

## 2021-06-22 PROBLEM — K62.5 RECTAL BLEEDING: Status: ACTIVE | Noted: 2021-06-22

## 2021-06-23 ENCOUNTER — OFFICE VISIT (OUTPATIENT)
Dept: INTERNAL MEDICINE | Facility: OTHER | Age: 81
End: 2021-06-23
Attending: FAMILY MEDICINE
Payer: COMMERCIAL

## 2021-06-23 VITALS
WEIGHT: 175 LBS | TEMPERATURE: 98.1 F | BODY MASS INDEX: 30.51 KG/M2 | HEART RATE: 72 BPM | SYSTOLIC BLOOD PRESSURE: 128 MMHG | DIASTOLIC BLOOD PRESSURE: 84 MMHG | RESPIRATION RATE: 18 BRPM | OXYGEN SATURATION: 98 %

## 2021-06-23 DIAGNOSIS — R94.31 ABNORMAL ELECTROCARDIOGRAM: Primary | ICD-10-CM

## 2021-06-23 DIAGNOSIS — E78.5 HYPERLIPIDEMIA, UNSPECIFIED HYPERLIPIDEMIA TYPE: ICD-10-CM

## 2021-06-23 PROCEDURE — G0463 HOSPITAL OUTPT CLINIC VISIT: HCPCS

## 2021-06-23 PROCEDURE — 99214 OFFICE O/P EST MOD 30 MIN: CPT | Performed by: INTERNAL MEDICINE

## 2021-06-23 ASSESSMENT — ENCOUNTER SYMPTOMS
CONSTITUTIONAL NEGATIVE: 1
ALLERGIC/IMMUNOLOGIC NEGATIVE: 1
ENDOCRINE NEGATIVE: 1
HEMATOLOGIC/LYMPHATIC NEGATIVE: 1

## 2021-06-23 ASSESSMENT — PAIN SCALES - GENERAL: PAINLEVEL: NO PAIN (0)

## 2021-06-23 NOTE — NURSING NOTE
"Chief Complaint   Patient presents with     Other     Consult Abnormal Results       Initial /84   Pulse 72   Temp 98.1  F (36.7  C) (Tympanic)   Resp 18   Wt 79.4 kg (175 lb)   SpO2 98%   Breastfeeding No   BMI 30.51 kg/m   Estimated body mass index is 30.51 kg/m  as calculated from the following:    Height as of 10/23/20: 1.613 m (5' 3.5\").    Weight as of this encounter: 79.4 kg (175 lb).  Medication Reconciliation: complete    Jean-Pierre Curiel LPN    "

## 2021-06-23 NOTE — PROGRESS NOTES
Chief Complaint:  Abnormal EKG.    HPI: Consultation is requested by Dr. Colby Stern.  This patient has an abnormal EKG.  This was done as part of routine monitoring of her psychiatric medications.  It showed a left axis deviation, possible left atrial enlargement, PVCs and a possible anterior infarct.  She tells me that she has never had any heart problems that she is aware of.  As I review her chart, her EKG is very little change compared to an EKG done in 2014.  In 2012 she had question of a possible stroke at which time an EKG was similar and an echocardiogram showed no wall motion abnormalities.    She has a history of treated depression as well as treated hypothyroidism.  She also has a history of hyperlipidemia and is on low intensity statin therapy.  She takes an aspirin daily.  She describes no episodes of chest pain, pressure, palpitations or dyspnea on exertion.    She does not smoke.  She is overweight.  She does not have diabetes.  Family history is as outlined below.  Recent Zio patch monitor showed some PVCs and nonsustained SVT otherwise unremarkable.    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  MRI of brain after head CT showed multiple enhancing lesions, concerning for metastatic illness.  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  Referred to ENT and oncology 5/1/12.     Depression      Hypothyroidism     1993     Other hyperlipidemia      Personal history of other medical treatment (CODE)     G3, P3-0-0-3 status post three NSVDs       Past Surgical History:   Procedure Laterality Date     COLONOSCOPY  2005    approximately 2005 in the [facility] - was normal.       No Known Allergies    Current Outpatient Medications   Medication Sig Dispense Refill     ARIPiprazole  (ABILIFY) 10 MG tablet Take 0.5 tablets (5 mg) by mouth daily 30 tablet 1     Aspirin Buf,CaCarb-MgCarb-MgO, (BUFFERED ASPIRIN) 325 MG TABS Take 1 tablet by mouth daily       bisacodyl (DULCOLAX) 5 MG EC tablet Use as directed per colonoscopy prep. 2 tablet 0     cholecalciferol (D 5000) 5000 UNITS CAPS Take one by mouth daily.  Have lab level of vitamin d rechecked after completion of 90 days.       levothyroxine (SYNTHROID/LEVOTHROID) 88 MCG tablet Take 1 tablet (88 mcg) by mouth daily 90 tablet 3     nortriptyline (PAMELOR) 25 MG capsule Take 1 capsule by mouth 2 times daily       simvastatin (ZOCOR) 10 MG tablet Take 1 tablet (10 mg) by mouth daily 90 tablet 3       Social History     Socioeconomic History     Marital status:      Spouse name: Not on file     Number of children: Not on file     Years of education: Not on file     Highest education level: Not on file   Occupational History     Not on file   Social Needs     Financial resource strain: Not on file     Food insecurity     Worry: Not on file     Inability: Not on file     Transportation needs     Medical: Not on file     Non-medical: Not on file   Tobacco Use     Smoking status: Never Smoker     Smokeless tobacco: Never Used   Substance and Sexual Activity     Alcohol use: No     Drug use: Never     Comment: Drug use: No     Sexual activity: Not Currently     Partners: Male     Comment: Birth control method: Birth control method: Menopausal   Lifestyle     Physical activity     Days per week: Not on file     Minutes per session: Not on file     Stress: Not on file   Relationships     Social connections     Talks on phone: Not on file     Gets together: Not on file     Attends Christianity service: Not on file     Active member of club or organization: Not on file     Attends meetings of clubs or organizations: Not on file     Relationship status: Not on file     Intimate partner violence     Fear of current or ex partner: Not on file      Emotionally abused: Not on file     Physically abused: Not on file     Forced sexual activity: Not on file   Other Topics Concern     Parent/sibling w/ CABG, MI or angioplasty before 65F 55M? Not Asked   Social History Narrative    Retired from  teaching    3 grown kids and 5 grandkids    Lives with  in [facility]    Caffiene 1    Calcium good    Water some    She is retired.  She and her  relocated to the [facility] from [place], [place].      Kirk Son, 4/10/64      Sh    tiffanie Daughter, 9/8/67      Karen Daughter, 3/9/69       Brian -        Review of Systems   Constitutional: Negative.    Endocrine: Negative.    Skin: Negative.    Allergic/Immunologic: Negative.    Hematological: Negative.        Physical Exam  Vitals signs and nursing note reviewed.   Constitutional:       General: She is not in acute distress.     Appearance: Normal appearance. She is not ill-appearing, toxic-appearing or diaphoretic.   Neck:      Vascular: No carotid bruit.   Cardiovascular:      Rate and Rhythm: Normal rate and regular rhythm.      Heart sounds: Normal heart sounds.   Pulmonary:      Effort: Pulmonary effort is normal.      Breath sounds: Normal breath sounds.   Abdominal:      Palpations: Abdomen is soft.      Tenderness: There is no abdominal tenderness.   Musculoskeletal:      Right lower leg: No edema.      Left lower leg: No edema.   Lymphadenopathy:      Cervical: No cervical adenopathy.   Skin:     General: Skin is warm and dry.   Neurological:      Mental Status: She is alert.         Assessment:      ICD-10-CM    1. Abnormal electrocardiogram  R94.31 NM Lexiscan stress test   2. Hyperlipidemia, unspecified hyperlipidemia type  E78.5        Plan: She has an abnormal EKG suggesting previous anterior infarct.  This is somewhat longstanding and I think it is probably more of variant than actual pathology.  Nonetheless, it would take further investigation to prove this.  I think it  is worth doing a stress test for further evaluation.  If this testing is normal, nothing further needs to be done.  She is in agreement.  Lexiscan stress test will be scheduled in the near future.

## 2021-06-24 ENCOUNTER — ALLIED HEALTH/NURSE VISIT (OUTPATIENT)
Dept: FAMILY MEDICINE | Facility: OTHER | Age: 81
End: 2021-06-24
Attending: SURGERY
Payer: MEDICARE

## 2021-06-24 DIAGNOSIS — K62.5 RECTAL BLEEDING: ICD-10-CM

## 2021-06-24 DIAGNOSIS — Z20.822 COVID-19 RULED OUT: Primary | ICD-10-CM

## 2021-06-24 LAB
LABORATORY COMMENT REPORT: NORMAL
SARS-COV-2 RNA RESP QL NAA+PROBE: NEGATIVE
SARS-COV-2 RNA RESP QL NAA+PROBE: NORMAL
SPECIMEN SOURCE: NORMAL
SPECIMEN SOURCE: NORMAL

## 2021-06-24 PROCEDURE — U0003 INFECTIOUS AGENT DETECTION BY NUCLEIC ACID (DNA OR RNA); SEVERE ACUTE RESPIRATORY SYNDROME CORONAVIRUS 2 (SARS-COV-2) (CORONAVIRUS DISEASE [COVID-19]), AMPLIFIED PROBE TECHNIQUE, MAKING USE OF HIGH THROUGHPUT TECHNOLOGIES AS DESCRIBED BY CMS-2020-01-R: HCPCS | Mod: ZL | Performed by: SURGERY

## 2021-06-24 PROCEDURE — C9803 HOPD COVID-19 SPEC COLLECT: HCPCS

## 2021-06-24 PROCEDURE — U0005 INFEC AGEN DETEC AMPLI PROBE: HCPCS | Mod: ZL | Performed by: SURGERY

## 2021-06-24 NOTE — NURSING NOTE
Patient swabbed for COVID-19 testing.  Andra Mcclelland LPN on 6/24/2021 at 11:52 AM    Pre-procedure

## 2021-06-28 ENCOUNTER — ANESTHESIA (OUTPATIENT)
Dept: SURGERY | Facility: OTHER | Age: 81
End: 2021-06-28
Payer: MEDICARE

## 2021-06-28 ENCOUNTER — HOSPITAL ENCOUNTER (OUTPATIENT)
Facility: OTHER | Age: 81
Discharge: HOME | End: 2021-06-28
Attending: SURGERY | Admitting: SURGERY
Payer: MEDICARE

## 2021-06-28 ENCOUNTER — ANESTHESIA EVENT (OUTPATIENT)
Dept: SURGERY | Facility: OTHER | Age: 81
End: 2021-06-28
Payer: MEDICARE

## 2021-06-28 ENCOUNTER — MYC MEDICAL ADVICE (OUTPATIENT)
Dept: FAMILY MEDICINE | Facility: OTHER | Age: 81
End: 2021-06-28

## 2021-06-28 ENCOUNTER — OFFICE VISIT (OUTPATIENT)
Dept: FAMILY MEDICINE | Facility: OTHER | Age: 81
End: 2021-06-28
Attending: FAMILY MEDICINE
Payer: MEDICARE

## 2021-06-28 VITALS
OXYGEN SATURATION: 94 % | RESPIRATION RATE: 20 BRPM | HEART RATE: 94 BPM | TEMPERATURE: 96.4 F | SYSTOLIC BLOOD PRESSURE: 120 MMHG | WEIGHT: 175 LBS | BODY MASS INDEX: 29.88 KG/M2 | HEIGHT: 64 IN | DIASTOLIC BLOOD PRESSURE: 80 MMHG

## 2021-06-28 VITALS
TEMPERATURE: 97.7 F | DIASTOLIC BLOOD PRESSURE: 95 MMHG | SYSTOLIC BLOOD PRESSURE: 144 MMHG | HEIGHT: 64 IN | BODY MASS INDEX: 29.88 KG/M2 | WEIGHT: 175 LBS | OXYGEN SATURATION: 97 % | HEART RATE: 98 BPM | RESPIRATION RATE: 20 BRPM

## 2021-06-28 DIAGNOSIS — K62.5 RECTAL BLEEDING: Primary | ICD-10-CM

## 2021-06-28 DIAGNOSIS — K64.5 THROMBOSED HEMORRHOIDS: Primary | ICD-10-CM

## 2021-06-28 PROBLEM — K57.30 SIGMOID DIVERTICULOSIS: Status: ACTIVE | Noted: 2021-06-28

## 2021-06-28 PROBLEM — K63.5 COLON POLYPS: Status: ACTIVE | Noted: 2021-06-28

## 2021-06-28 PROCEDURE — 258N000003 HC RX IP 258 OP 636: Performed by: SURGERY

## 2021-06-28 PROCEDURE — 250N000009 HC RX 250: Performed by: NURSE ANESTHETIST, CERTIFIED REGISTERED

## 2021-06-28 PROCEDURE — 45384 COLONOSCOPY W/LESION REMOVAL: CPT | Mod: XU | Performed by: SURGERY

## 2021-06-28 PROCEDURE — 99100 ANES PT EXTEME AGE<1 YR&>70: CPT | Performed by: NURSE ANESTHETIST, CERTIFIED REGISTERED

## 2021-06-28 PROCEDURE — 45385 COLONOSCOPY W/LESION REMOVAL: CPT | Performed by: NURSE ANESTHETIST, CERTIFIED REGISTERED

## 2021-06-28 PROCEDURE — 250N000011 HC RX IP 250 OP 636: Performed by: NURSE ANESTHETIST, CERTIFIED REGISTERED

## 2021-06-28 PROCEDURE — 88305 TISSUE EXAM BY PATHOLOGIST: CPT

## 2021-06-28 PROCEDURE — 45385 COLONOSCOPY W/LESION REMOVAL: CPT

## 2021-06-28 PROCEDURE — 99212 OFFICE O/P EST SF 10 MIN: CPT | Performed by: FAMILY MEDICINE

## 2021-06-28 PROCEDURE — 45385 COLONOSCOPY W/LESION REMOVAL: CPT | Performed by: SURGERY

## 2021-06-28 PROCEDURE — G0463 HOSPITAL OUTPT CLINIC VISIT: HCPCS | Mod: 25 | Performed by: FAMILY MEDICINE

## 2021-06-28 PROCEDURE — 45380 COLONOSCOPY AND BIOPSY: CPT | Performed by: SURGERY

## 2021-06-28 RX ORDER — NALOXONE HYDROCHLORIDE 0.4 MG/ML
0.4 INJECTION, SOLUTION INTRAMUSCULAR; INTRAVENOUS; SUBCUTANEOUS
Status: DISCONTINUED | OUTPATIENT
Start: 2021-06-28 | End: 2021-06-28 | Stop reason: HOSPADM

## 2021-06-28 RX ORDER — PROPOFOL 10 MG/ML
INJECTION, EMULSION INTRAVENOUS PRN
Status: DISCONTINUED | OUTPATIENT
Start: 2021-06-28 | End: 2021-06-28

## 2021-06-28 RX ORDER — FLUMAZENIL 0.1 MG/ML
0.2 INJECTION, SOLUTION INTRAVENOUS
Status: DISCONTINUED | OUTPATIENT
Start: 2021-06-28 | End: 2021-06-28 | Stop reason: HOSPADM

## 2021-06-28 RX ORDER — HYDROCORTISONE ACETATE 25 MG/1
25 SUPPOSITORY RECTAL 2 TIMES DAILY
Qty: 24 SUPPOSITORY | Refills: 0 | Status: SHIPPED | OUTPATIENT
Start: 2021-06-28 | End: 2021-07-10

## 2021-06-28 RX ORDER — LIDOCAINE HYDROCHLORIDE 20 MG/ML
INJECTION, SOLUTION INFILTRATION; PERINEURAL PRN
Status: DISCONTINUED | OUTPATIENT
Start: 2021-06-28 | End: 2021-06-28

## 2021-06-28 RX ORDER — PROPOFOL 10 MG/ML
INJECTION, EMULSION INTRAVENOUS CONTINUOUS PRN
Status: DISCONTINUED | OUTPATIENT
Start: 2021-06-28 | End: 2021-06-28

## 2021-06-28 RX ORDER — NALOXONE HYDROCHLORIDE 0.4 MG/ML
0.2 INJECTION, SOLUTION INTRAMUSCULAR; INTRAVENOUS; SUBCUTANEOUS
Status: DISCONTINUED | OUTPATIENT
Start: 2021-06-28 | End: 2021-06-28 | Stop reason: HOSPADM

## 2021-06-28 RX ORDER — SODIUM CHLORIDE, SODIUM LACTATE, POTASSIUM CHLORIDE, CALCIUM CHLORIDE 600; 310; 30; 20 MG/100ML; MG/100ML; MG/100ML; MG/100ML
INJECTION, SOLUTION INTRAVENOUS CONTINUOUS
Status: DISCONTINUED | OUTPATIENT
Start: 2021-06-28 | End: 2021-06-28 | Stop reason: HOSPADM

## 2021-06-28 RX ORDER — LIDOCAINE 40 MG/G
CREAM TOPICAL
Status: DISCONTINUED | OUTPATIENT
Start: 2021-06-28 | End: 2021-06-28 | Stop reason: HOSPADM

## 2021-06-28 RX ORDER — DIBUCAINE 1 G/100G
OINTMENT TOPICAL 3 TIMES DAILY PRN
Qty: 28 G | Refills: 1 | Status: SHIPPED | OUTPATIENT
Start: 2021-06-28 | End: 2021-12-20

## 2021-06-28 RX ORDER — ONDANSETRON 2 MG/ML
4 INJECTION INTRAMUSCULAR; INTRAVENOUS
Status: DISCONTINUED | OUTPATIENT
Start: 2021-06-28 | End: 2021-06-28 | Stop reason: HOSPADM

## 2021-06-28 RX ADMIN — PROPOFOL 140 MCG/KG/MIN: 10 INJECTION, EMULSION INTRAVENOUS at 13:15

## 2021-06-28 RX ADMIN — LIDOCAINE HYDROCHLORIDE 60 MG: 20 INJECTION, SOLUTION INFILTRATION; PERINEURAL at 13:15

## 2021-06-28 RX ADMIN — SODIUM CHLORIDE, POTASSIUM CHLORIDE, SODIUM LACTATE AND CALCIUM CHLORIDE: 600; 310; 30; 20 INJECTION, SOLUTION INTRAVENOUS at 12:56

## 2021-06-28 RX ADMIN — PROPOFOL 70 MG: 10 INJECTION, EMULSION INTRAVENOUS at 13:15

## 2021-06-28 ASSESSMENT — ENCOUNTER SYMPTOMS
COUGH: 0
NERVOUS/ANXIOUS: 0
CHILLS: 0
SHORTNESS OF BREATH: 0
FEVER: 0
DYSRHYTHMIAS: 1

## 2021-06-28 ASSESSMENT — MIFFLIN-ST. JEOR
SCORE: 1235.85
SCORE: 1235.85

## 2021-06-28 ASSESSMENT — PAIN SCALES - GENERAL: PAINLEVEL: NO PAIN (0)

## 2021-06-28 NOTE — INTERVAL H&P NOTE
The history and physical has been reviewed and the patient has been examined.  There are no interim changes to the patient's history or physical condition.  Plan: Colonoscopy for rectal bleeding. Risks of bleeding, perforation, incomplete examination discussed and wishes to proceed. MAC needed for age.  David Carter MD

## 2021-06-28 NOTE — ANESTHESIA POSTPROCEDURE EVALUATION
Patient: Princess Leone    Procedure(s):  COLONOSCOPY, WITH POLYPECTOMY AND BIOPSY    Diagnosis:Rectal bleeding [K62.5]  Diagnosis Additional Information: No value filed.    Anesthesia Type:  MAC    Note:  Disposition: Outpatient   Postop Pain Control:            Sign Out: Well controlled pain   PONV: No   Neuro/Psych:             Sign Out: Acceptable/Baseline neuro status   Airway/Respiratory:             Sign Out: Acceptable/Baseline resp. status   CV/Hemodynamics:             Sign Out: Acceptable CV status   Other NRE: NONE   DID A NON-ROUTINE EVENT OCCUR? No           Last vitals:  Vitals:    06/28/21 1340 06/28/21 1345 06/28/21 1400   BP: 113/73 113/73 136/75   Pulse: 94  88   Resp:      Temp: 97.7  F (36.5  C)     SpO2: 98% 95% 100%       Last vitals prior to Anesthesia Care Transfer:  CRNA VITALS  6/28/2021 1306 - 6/28/2021 1406      6/28/2021             Resp Rate (set):  10          Electronically Signed By: David Kellerman, APRN CRNA  June 28, 2021  2:08 PM

## 2021-06-28 NOTE — ANESTHESIA PREPROCEDURE EVALUATION
Anesthesia Pre-Procedure Evaluation    Patient: Princess Leone   MRN: [id number] : 1940        Preoperative Diagnosis: Rectal bleeding [K62.5]   Procedure : Procedure(s):  COLONOSCOPY     Past Medical History:   Diagnosis Date     Cerebral infarction (H)     12,left parietal CVA with expressive aphasia.  MRI of brain after head CT showed multiple enhancing lesions, concerning for metastatic illness.  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  Referred to ENT and oncology 12.     Depression      Hypothyroidism          Other hyperlipidemia      Personal history of other medical treatment (CODE)     G3, P3-0-0-3 status post three NSVDs      Past Surgical History:   Procedure Laterality Date     COLONOSCOPY      approximately  in [facility] - was normal.      No Known Allergies   Social History     Tobacco Use     Smoking status: Never Smoker     Smokeless tobacco: Never Used   Substance Use Topics     Alcohol use: No      Wt Readings from Last 1 Encounters:   21 79.4 kg (175 lb)        Anesthesia Evaluation   Pt has had prior anesthetic.         ROS/MED HX  ENT/Pulmonary:  - neg pulmonary ROS     Neurologic: Comment: Cognitive delays or altered mental status, answers questions well, but is confused with some question, like why is she at the hospital and what procedure she was having done today.    (+) CVA, TIA,     Cardiovascular:     (+) Dyslipidemia -----dysrhythmias, a-fib,     METS/Exercise Tolerance: 3 - Able to walk 1-2 blocks without stopping    Hematologic:  - neg hematologic  ROS     Musculoskeletal:   (+) arthritis,     GI/Hepatic: Comment: Rectal bleeding      Renal/Genitourinary:     (+) renal disease,     Endo:     (+) thyroid problem, hypothyroidism,     Psychiatric/Substance Use:     (+) psychiatric history depression      Infectious Disease:       Malignancy:       Other:  - neg other ROS          Physical Exam    Airway        Mallampati: III   TM distance: > 3 FB   Neck ROM: limited   Mouth opening: < 3 cm    Respiratory Devices and Support         Dental       (+) chipped      Cardiovascular   cardiovascular exam normal       Rhythm and rate: regular and normal     Pulmonary   pulmonary exam normal        breath sounds clear to auscultation           OUTSIDE LABS:  CBC:   Lab Results   Component Value Date    WBC 7.2 06/08/2021    WBC 8.4 10/23/2020    HGB 14.8 06/08/2021    HGB 14.7 10/23/2020    HCT 44.8 06/08/2021    HCT 45.2 10/23/2020     06/08/2021     10/23/2020     BMP:   Lab Results   Component Value Date     06/08/2021     10/23/2020    POTASSIUM 3.8 06/08/2021    POTASSIUM 4.1 10/23/2020    CHLORIDE 106 06/08/2021    CHLORIDE 104 10/23/2020    CO2 27 06/08/2021    CO2 30 10/23/2020    BUN 18 06/08/2021    BUN 16 10/23/2020    CR 1.24 (H) 06/08/2021    CR 1.18 11/04/2020     (H) 06/08/2021     (H) 10/23/2020     COAGS:   Lab Results   Component Value Date    INR 0.97 06/08/2021     POC: No results found for: BGM, HCG, HCGS  HEPATIC:   Lab Results   Component Value Date    ALBUMIN 4.0 10/23/2020    PROTTOTAL 7.2 10/23/2020    ALT 15 10/23/2020    AST 17 10/23/2020    ALKPHOS 59 10/23/2020    BILITOTAL 0.4 10/23/2020     OTHER:   Lab Results   Component Value Date    RAPHAEL 9.3 06/08/2021       Anesthesia Plan    ASA Status:  3   NPO Status:  NPO Appropriate    Anesthesia Type: MAC.     - Reason for MAC: straight local not clinically adequate, chronic cardiopulmonary disease              Consents    Anesthesia Plan(s) and associated risks, benefits, and realistic alternatives discussed. Questions answered and patient/representative(s) expressed understanding.     - Discussed with:  Patient         Postoperative Care            Comments:                David Kellerman, APRN CRNA

## 2021-06-28 NOTE — H&P (VIEW-ONLY)
"  SUBJECTIVE:   Nursing Notes:   Cely Wiseman LPN  6/28/2021  9:47 AM  Sign at exiting of workspace  Chief Complaint   Patient presents with     Rectal Problem     bleeding   Patient has a colonoscopy at noon today. She is unsure about appointment .   FOOD SECURITY SCREENING QUESTIONS  Hunger Vital Signs:  Within the past 12 months we worried whether our food would run out before we got money to buy more. Never  Within the past 12 months the food we bought just didn't last and we didn't have money to get more. Never  Cely Wiseman LPN 6/28/2021 9:47 AM      Initial /80 (BP Location: Right arm, Patient Position: Sitting, Cuff Size: Adult Regular)   Pulse 94   Temp 96.4  F (35.8  C) (Tympanic)   Resp 20   Ht 1.613 m (5' 3.5\")   Wt 79.4 kg (175 lb)   SpO2 94%   BMI 30.51 kg/m   Estimated body mass index is 30.51 kg/m  as calculated from the following:    Height as of this encounter: 1.613 m (5' 3.5\").    Weight as of this encounter: 79.4 kg (175 lb).  Medication Reconciliation: complete    Cely Wiseman LPN      Princess Leone is a 81 year old female who presents to clinic today for a complaint of rectal bleeding.  Has colonoscopy scheduled later today to evaluate rectal bleeding.  The bleeding has subsided since I last saw her.  She has been having normal bowel movements for her, but hasn't had a normal one for 4-5 days.  She has always had infrequent bowel movements.  She did the prep for the colonoscopy and had a lot of watery stools.  Nothing looking like blood was in the stool during her prep.    HPI    I personally reviewed medications/allergies/history listed below:    Patient Active Problem List    Diagnosis Date Noted     Rectal bleeding 06/22/2021     Priority: Medium     Osteopenia, unspecified location 06/24/2019     Priority: Medium     Allergic rhinitis 01/24/2018     Priority: Medium     Retinal vascular occlusion 01/24/2018     Priority: Medium     Overview:   Left eye " subtotal central retinal vein occlusion.       Depression 01/24/2018     Priority: Medium     Dysthymia 01/24/2018     Priority: Medium     Hearing loss 01/24/2018     Priority: Medium     Overview:   Mild       Hypothyroidism 01/24/2018     Priority: Medium     Symptomatic menopausal or female climacteric states 01/24/2018     Priority: Medium     Abnormal glucose 01/24/2018     Priority: Medium     Vaginitis, atrophic 01/24/2018     Priority: Medium     CKD (chronic kidney disease) 11/02/2016     Priority: Medium     Vitamin D deficiency 03/19/2015     Priority: Medium     Cerebral lesion 05/17/2012     Priority: Medium     Intermittent atrial fibrillation (H) 05/17/2012     Priority: Medium     Other nonspecific abnormal result of function study of brain and central nervous system 04/24/2012     Priority: Medium     Cerebral artery occlusion with cerebral infarction (H) 04/06/2012     Priority: Medium     Viral upper respiratory tract infection 01/20/2012     Priority: Medium     Abnormal electrocardiogram 06/09/2011     Priority: Medium     Hyperlipidemia 05/03/2010     Priority: Medium     Carpal tunnel syndrome 11/24/2003     Priority: Medium     Past Medical History:   Diagnosis Date     Cerebral infarction (H)     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  MRI of brain after head CT showed multiple enhancing lesions, concerning for metastatic illness.  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  Referred to ENT and oncology 5/1/12.     Depression      Hypothyroidism     1993     Other hyperlipidemia      Personal history of other medical treatment (CODE)     G3, P3-0-0-3 status post three NSVDs      Past Surgical History:   Procedure Laterality Date     COLONOSCOPY  2005    approximately 2005 in the [facility] - was normal.     Family History   Problem  "Relation Age of Onset     Heart Disease Mother         Heart Disease,D & C at 91/Heart Disease,pacemaker     Hypertension Mother         Hypertension     Other - See Comments Father          at 76.  He had peripheral vascular disease. Bypass./COPD     Heart Disease Brother         Heart Disease, carotid disease and has gone through stenting     Other - See Comments Brother         tinnitis     Heart Disease Brother         Heart Disease,CABG x 3     Heart Surgery Son         Aortic Valve replacement due to bicuspid aortic valve.     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  She and her  relocated to the [facility] from [place], [place].      Kirk Son, 4/10/64          tiffanie Daughter, 67      Karen Daughter, 3/9/69       Brian -      No current outpatient medications on file.     No Known Allergies    Review of Systems   Constitutional: Negative for chills and fever.   Respiratory: Negative for cough and shortness of breath.    Cardiovascular: Negative for peripheral edema.   Psychiatric/Behavioral: Negative for mood changes. The patient is not nervous/anxious.         OBJECTIVE:     /80 (BP Location: Right arm, Patient Position: Sitting, Cuff Size: Adult Regular)   Pulse 94   Temp 96.4  F (35.8  C) (Tympanic)   Resp 20   Ht 1.613 m (5' 3.5\")   Wt 79.4 kg (175 lb)   SpO2 94%   BMI 30.51 kg/m    Body mass index is 30.51 kg/m .  Physical Exam  Constitutional:       Appearance: Normal appearance.   HENT:      Head: Normocephalic.   Eyes:      Extraocular Movements: Extraocular movements intact.      Pupils: " Pupils are equal, round, and reactive to light.   Neck:      Musculoskeletal: Normal range of motion and neck supple.   Cardiovascular:      Rate and Rhythm: Normal rate and regular rhythm.      Heart sounds: Normal heart sounds. No murmur.   Pulmonary:      Effort: Pulmonary effort is normal.      Breath sounds: Normal breath sounds. No wheezing, rhonchi or rales.   Musculoskeletal:      Right lower leg: No edema.      Left lower leg: No edema.   Neurological:      Mental Status: She is alert.   Psychiatric:         Mood and Affect: Mood normal.         Behavior: Behavior normal.           PHQ-9 SCORE 6/24/2019 7/16/2020 6/8/2021   PHQ-9 Total Score 0 0 3       PHQ-2 Score:     PHQ-2 ( 1999 Pfizer) 6/28/2021 6/23/2021   Q1: Little interest or pleasure in doing things 0 0   Q2: Feeling down, depressed or hopeless 0 0   PHQ-2 Score 0 0       ANGIE-7 SCORE 6/24/2019   Total Score 0           I personally reviewed results withpatient as listed below:   Diagnostic Test Results:  none     ASSESSMENT/PLAN:       ICD-10-CM    1. Rectal bleeding  K62.5        1.  Stable.  She has her colonoscopy later today to evaluate this further.    Linda Moreno MD  [facility] AND hospitals

## 2021-06-28 NOTE — OP NOTE
PROCEDURE NOTE    DATE OF SERVICE: 6/28/2021    SURGEON: David Carter MD    PRE-OP DIAGNOSIS:      Rectal bleeding        POST-OP DIAGNOSIS:  Same  Thrombosed hemorrhoid  Sigmoid Diverticulosis  Polyps at cecum and mid TC    PROCEDURE:     Colonoscopy with snare polypectomy and hot biopsy    ANESTHESIA:  SHIN Thompson CRNA    INDICATION FOR THE PROCEDURE: The patient is a 81 year old female with episode of rectal bleeding . The patient has no other complaints  . After explaining the risks to include bleeding, perforation, potential inability toreach the cecum, the patient wished to proceed.    PROCEDURE:After adequate sedation, the patient was in the left lateral decubitus position.  Rectal exam was performed.  There was normal tone and no palpable masses and a draining thrombosed hemorrhoid right posterior position.  The colonoscope was introduced into the rectum and advanced to the cecum with Mild difficulty.  The patient's prep was good.  The terminal cecum was reached.  The cecum, ascending, transverse, descending and sigmoid colon was with extensive sigmoid diverticulosis .  A diminutive cecal polyp was hot biopsied and destroyed. A mid TC flat under 1 cm polyp was completely removed by snare. The scope was retroflexed in the rectum.  The rectum was unremarkable  .  The scope was straightened and removed.  The patient tolerated the procedure well.     ESTIMATED BLOOD LOSS: none    COMPLICATIONS:  None    TISSUE REMOVED:  Yes    RECOMMEND:      Anusol and Nupercaine  Follow-up pending pathology  Fiber  Given literature on diverticulosis    David Carter MD FACS

## 2021-06-28 NOTE — ANESTHESIA CARE TRANSFER NOTE
Patient: Princess Leone    Procedure(s):  COLONOSCOPY, WITH POLYPECTOMY AND BIOPSY    Diagnosis: Rectal bleeding [K62.5]  Diagnosis Additional Information: No value filed.    Anesthesia Type:   MAC     Note:    Oropharynx: oropharynx clear of all foreign objects  Level of Consciousness: drowsy  Oxygen Supplementation: face mask  Level of Supplemental Oxygen (L/min / FiO2): 2  Independent Airway: airway patency satisfactory and stable  Dentition: dentition unchanged  Vital Signs Stable: post-procedure vital signs reviewed and stable  Report to RN Given: handoff report given  Patient transferred to: Phase II    Handoff Report: Identifed the Patient, Identified the Reponsible Provider, Reviewed the pertinent medical history, Discussed the surgical course, Reviewed Intra-OP anesthesia mangement and issues during anesthesia, Set expectations for post-procedure period and Allowed opportunity for questions and acknowledgement of understanding      Vitals: (Last set prior to Anesthesia Care Transfer)  CRNA VITALS  6/28/2021 1306 - 6/28/2021 1336      6/28/2021             Pulse:  95    Ht Rate:  95    SpO2:  100 %    Resp Rate (set):  10        Electronically Signed By: ANA PAULA Holm CRNA  June 28, 2021  1:36 PM

## 2021-06-28 NOTE — NURSING NOTE
"Chief Complaint   Patient presents with     Rectal Problem     bleeding   Patient has a colonoscopy at noon today. She is unsure about appointment .   FOOD SECURITY SCREENING QUESTIONS  Hunger Vital Signs:  Within the past 12 months we worried whether our food would run out before we got money to buy more. Never  Within the past 12 months the food we bought just didn't last and we didn't have money to get more. Never  Cely Wiseman LPN 6/28/2021 9:47 AM      Initial /80 (BP Location: Right arm, Patient Position: Sitting, Cuff Size: Adult Regular)   Pulse 94   Temp 96.4  F (35.8  C) (Tympanic)   Resp 20   Ht 1.613 m (5' 3.5\")   Wt 79.4 kg (175 lb)   SpO2 94%   BMI 30.51 kg/m   Estimated body mass index is 30.51 kg/m  as calculated from the following:    Height as of this encounter: 1.613 m (5' 3.5\").    Weight as of this encounter: 79.4 kg (175 lb).  Medication Reconciliation: complete    Cely Wiseman LPN  "

## 2021-06-28 NOTE — DISCHARGE INSTRUCTIONS
[facility]  Adult Discharge Orders & Instructions         For 12 hours after surgery  1. Get plenty of rest.  A responsible adult must stay with you for at least 12 hours after you leave the hospital.   2. You may feel lightheaded.  IF so, sit for a few minutes before standing.  Have someone help you get up.   3. You may have a slight fever. Call the doctor if your fever is over 101 F (38.3 C) (taken under the tongue) or lasts longer than 24 hours.  4. You may have a dry mouth, a sore throat, muscle aches or trouble sleeping.  These should go away after 24 hours.  5. Do not make important or legal decisions.  6.   Do not drive or use heavy equipment.  If you have weakness or tingling, don't drive or use heavy equipment until this feeling goes away.    To contact a doctor, call   [phone]

## 2021-06-28 NOTE — PROGRESS NOTES
"  SUBJECTIVE:   Nursing Notes:   Cely Wiseman LPN  6/28/2021  9:47 AM  Sign at exiting of workspace  Chief Complaint   Patient presents with     Rectal Problem     bleeding   Patient has a colonoscopy at noon today. She is unsure about appointment .   FOOD SECURITY SCREENING QUESTIONS  Hunger Vital Signs:  Within the past 12 months we worried whether our food would run out before we got money to buy more. Never  Within the past 12 months the food we bought just didn't last and we didn't have money to get more. Never  Cely Wiseman LPN 6/28/2021 9:47 AM      Initial /80 (BP Location: Right arm, Patient Position: Sitting, Cuff Size: Adult Regular)   Pulse 94   Temp 96.4  F (35.8  C) (Tympanic)   Resp 20   Ht 1.613 m (5' 3.5\")   Wt 79.4 kg (175 lb)   SpO2 94%   BMI 30.51 kg/m   Estimated body mass index is 30.51 kg/m  as calculated from the following:    Height as of this encounter: 1.613 m (5' 3.5\").    Weight as of this encounter: 79.4 kg (175 lb).  Medication Reconciliation: complete    Cely Wiseman LPN      Princess Leone is a 81 year old female who presents to clinic today for a complaint of rectal bleeding.  Has colonoscopy scheduled later today to evaluate rectal bleeding.  The bleeding has subsided since I last saw her.  She has been having normal bowel movements for her, but hasn't had a normal one for 4-5 days.  She has always had infrequent bowel movements.  She did the prep for the colonoscopy and had a lot of watery stools.  Nothing looking like blood was in the stool during her prep.    HPI    I personally reviewed medications/allergies/history listed below:    Patient Active Problem List    Diagnosis Date Noted     Rectal bleeding 06/22/2021     Priority: Medium     Osteopenia, unspecified location 06/24/2019     Priority: Medium     Allergic rhinitis 01/24/2018     Priority: Medium     Retinal vascular occlusion 01/24/2018     Priority: Medium     Overview:   Left eye " subtotal central retinal vein occlusion.       Depression 01/24/2018     Priority: Medium     Dysthymia 01/24/2018     Priority: Medium     Hearing loss 01/24/2018     Priority: Medium     Overview:   Mild       Hypothyroidism 01/24/2018     Priority: Medium     Symptomatic menopausal or female climacteric states 01/24/2018     Priority: Medium     Abnormal glucose 01/24/2018     Priority: Medium     Vaginitis, atrophic 01/24/2018     Priority: Medium     CKD (chronic kidney disease) 11/02/2016     Priority: Medium     Vitamin D deficiency 03/19/2015     Priority: Medium     Cerebral lesion 05/17/2012     Priority: Medium     Intermittent atrial fibrillation (H) 05/17/2012     Priority: Medium     Other nonspecific abnormal result of function study of brain and central nervous system 04/24/2012     Priority: Medium     Cerebral artery occlusion with cerebral infarction (H) 04/06/2012     Priority: Medium     Viral upper respiratory tract infection 01/20/2012     Priority: Medium     Abnormal electrocardiogram 06/09/2011     Priority: Medium     Hyperlipidemia 05/03/2010     Priority: Medium     Carpal tunnel syndrome 11/24/2003     Priority: Medium     Past Medical History:   Diagnosis Date     Cerebral infarction (H)     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  MRI of brain after head CT showed multiple enhancing lesions, concerning for metastatic illness.  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  Referred to ENT and oncology 5/1/12.     Depression      Hypothyroidism     1993     Other hyperlipidemia      Personal history of other medical treatment (CODE)     G3, P3-0-0-3 status post three NSVDs      Past Surgical History:   Procedure Laterality Date     COLONOSCOPY  2005    approximately 2005 in the [facility] - was normal.     Family History   Problem  "Relation Age of Onset     Heart Disease Mother         Heart Disease,D & C at 91/Heart Disease,pacemaker     Hypertension Mother         Hypertension     Other - See Comments Father          at 76.  He had peripheral vascular disease. Bypass./COPD     Heart Disease Brother         Heart Disease, carotid disease and has gone through stenting     Other - See Comments Brother         tinnitis     Heart Disease Brother         Heart Disease,CABG x 3     Heart Surgery Son         Aortic Valve replacement due to bicuspid aortic valve.     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  She and her  relocated to the [facility] from [place], [place].      Kirk Son, 4/10/64          tiffanie Daughter, 67      Karen Daughter, 3/9/69       Brian -      No current outpatient medications on file.     No Known Allergies    Review of Systems   Constitutional: Negative for chills and fever.   Respiratory: Negative for cough and shortness of breath.    Cardiovascular: Negative for peripheral edema.   Psychiatric/Behavioral: Negative for mood changes. The patient is not nervous/anxious.         OBJECTIVE:     /80 (BP Location: Right arm, Patient Position: Sitting, Cuff Size: Adult Regular)   Pulse 94   Temp 96.4  F (35.8  C) (Tympanic)   Resp 20   Ht 1.613 m (5' 3.5\")   Wt 79.4 kg (175 lb)   SpO2 94%   BMI 30.51 kg/m    Body mass index is 30.51 kg/m .  Physical Exam  Constitutional:       Appearance: Normal appearance.   HENT:      Head: Normocephalic.   Eyes:      Extraocular Movements: Extraocular movements intact.      Pupils: " Pupils are equal, round, and reactive to light.   Neck:      Musculoskeletal: Normal range of motion and neck supple.   Cardiovascular:      Rate and Rhythm: Normal rate and regular rhythm.      Heart sounds: Normal heart sounds. No murmur.   Pulmonary:      Effort: Pulmonary effort is normal.      Breath sounds: Normal breath sounds. No wheezing, rhonchi or rales.   Musculoskeletal:      Right lower leg: No edema.      Left lower leg: No edema.   Neurological:      Mental Status: She is alert.   Psychiatric:         Mood and Affect: Mood normal.         Behavior: Behavior normal.           PHQ-9 SCORE 6/24/2019 7/16/2020 6/8/2021   PHQ-9 Total Score 0 0 3       PHQ-2 Score:     PHQ-2 ( 1999 Pfizer) 6/28/2021 6/23/2021   Q1: Little interest or pleasure in doing things 0 0   Q2: Feeling down, depressed or hopeless 0 0   PHQ-2 Score 0 0       ANGIE-7 SCORE 6/24/2019   Total Score 0           I personally reviewed results withpatient as listed below:   Diagnostic Test Results:  none     ASSESSMENT/PLAN:       ICD-10-CM    1. Rectal bleeding  K62.5        1.  Stable.  She has her colonoscopy later today to evaluate this further.    Linda Moreno MD  [facility] AND \[facility]\""

## 2021-06-29 ENCOUNTER — TELEPHONE (OUTPATIENT)
Dept: FAMILY MEDICINE | Facility: OTHER | Age: 81
End: 2021-06-29

## 2021-06-29 ENCOUNTER — TELEPHONE (OUTPATIENT)
Dept: SURGERY | Facility: OTHER | Age: 81
End: 2021-06-29

## 2021-06-29 NOTE — TELEPHONE ENCOUNTER
Pt returning call of Unit 2 Nurse, please call back, thanks!    Codi Agee on 6/29/2021 at 11:33 AM

## 2021-06-29 NOTE — TELEPHONE ENCOUNTER
Note was closed . I called patient and she will see patient 07/12/21 at 10:40 for moles. Cely Wiseman LPN ....................6/29/2021  1:11 PM

## 2021-06-29 NOTE — TELEPHONE ENCOUNTER
I tried to call patient regarding appointment and I left a message to call unit 4.  I made an appointment on 7-12-21 per Josh.

## 2021-06-29 NOTE — TELEPHONE ENCOUNTER
Called and left message to call back. Patient is in [facility] schedule July 12 that 10:40 am. Cely Wiseman LPN ....................6/29/2021  1:07 PM

## 2021-06-29 NOTE — TELEPHONE ENCOUNTER
We received a fax from [employer] denying the RX PA Request that was submitted for Anucort-HC suppository stating:    We have denied coverage or payment under this patients Medicare Part D benefit for the following prescription drug(s) that were/was requested: ANUCORT-HC Suppository  Why did we deny your request?  We denied this request under Medicare Part D because: This patients plan's Medicare Part D drug plan cannot cover drugs classified as being part of the Food and Drug Administration (FDA)'s Drug Efficacy Study Implementation (KYLER) program. This patients Medicare Part D drug benefit was asked to cover the requested drug.  The requested drug has been evaluated by the FDA as part of its KYLER program. The FDA s KYLER program evaluates the effectiveness of those drugs that had been previously approved on safety grounds alone. Drugs in this program are those that were approved prior to 1962. This is a program that evaluates older drugs (approved many years ago) when drugs were only approved based on the drug's safety and did not require the drug to be effective.  Section 1860D-2(e)(1) of the Social Security Act (the Act) generally defines a Part D drug to include those drugs that may be dispensed only upon a prescription and that meet the requirements of section 1927(k)(2) of  the Social Security Act. The definition of a Part D drug does not include KYLER drugs. Medicare does not recognize drugs in the KYLER program as drugs that meet the definition of a Part D drug. These drugs are excluded under this patients Medicare Part D drug benefit; therefore, the request for coverage under this patients Medicare   Part D plan is denied.    For an Expedited Appeal,  Call [phone]    For a Standard Appeal,  Call [phone]  OR  Fax [phone]  OR   Plan Website:  [email or web address]  OR  Mail to:  [facility], Coverage Determinations/Exceptions  [address] [id number],   [place] [address]    Faxed  Denial/Appeal information to the nurses at [phone].

## 2021-06-30 NOTE — TELEPHONE ENCOUNTER
Spoke with patient and she said everything has resolved now and she will not be needing the suppositories.    Kizzy Barkley LPN..........6/30/2021  12:45 PM

## 2021-07-05 PROBLEM — Z86.0101 H/O ADENOMATOUS POLYP OF COLON: Status: ACTIVE | Noted: 2021-06-28

## 2021-07-08 ENCOUNTER — MYC MEDICAL ADVICE (OUTPATIENT)
Dept: INTERNAL MEDICINE | Facility: OTHER | Age: 81
End: 2021-07-08

## 2021-07-12 ENCOUNTER — OFFICE VISIT (OUTPATIENT)
Dept: FAMILY MEDICINE | Facility: OTHER | Age: 81
End: 2021-07-12
Attending: FAMILY MEDICINE
Payer: COMMERCIAL

## 2021-07-12 VITALS
HEART RATE: 100 BPM | DIASTOLIC BLOOD PRESSURE: 70 MMHG | WEIGHT: 176 LBS | SYSTOLIC BLOOD PRESSURE: 120 MMHG | OXYGEN SATURATION: 100 % | BODY MASS INDEX: 30.05 KG/M2 | RESPIRATION RATE: 22 BRPM | TEMPERATURE: 98 F | HEIGHT: 64 IN

## 2021-07-12 DIAGNOSIS — L82.1 SEBORRHEIC KERATOSIS: Primary | ICD-10-CM

## 2021-07-12 PROCEDURE — G0463 HOSPITAL OUTPT CLINIC VISIT: HCPCS

## 2021-07-12 PROCEDURE — 99213 OFFICE O/P EST LOW 20 MIN: CPT | Performed by: FAMILY MEDICINE

## 2021-07-12 ASSESSMENT — ENCOUNTER SYMPTOMS
FEVER: 0
COUGH: 0
NERVOUS/ANXIOUS: 0
CHILLS: 0

## 2021-07-12 ASSESSMENT — MIFFLIN-ST. JEOR: SCORE: 1240.39

## 2021-07-12 ASSESSMENT — PAIN SCALES - GENERAL: PAINLEVEL: NO PAIN (0)

## 2021-07-12 NOTE — PATIENT INSTRUCTIONS
The spot on Trina's left posterior shoulder is called a seborrheic keratosis.  These are benign skin lesions that form more often as a person gets older.  These do not turn into skin cancer and are harmless.  Occasionally, they can get irritated or inflamed.  If this happens, they can be removed.  However, if it is not causing symptoms it may be left alone without any concern.

## 2021-07-12 NOTE — PROGRESS NOTES
"  SUBJECTIVE:   Nursing Notes:   Cely Wiseman LPN  7/12/2021 10:58 AM  Sign at exiting of workspace  Chief Complaint   Patient presents with     Derm Problem     moles   Patient has mole on her left shoulder and by bra strap.  FOOD SECURITY SCREENING QUESTIONS  Hunger Vital Signs:  Within the past 12 months we worried whether our food would run out before we got money to buy more. Never  Within the past 12 months the food we bought just didn't last and we didn't have money to get more. Never  Cely Wiseman LPN 7/12/2021 10:58 AM      Initial /70 (BP Location: Right arm, Patient Position: Sitting, Cuff Size: Adult Regular)   Pulse 100   Temp 98  F (36.7  C)   Resp 22   Ht 1.613 m (5' 3.5\")   Wt 79.8 kg (176 lb)   SpO2 100%   BMI 30.69 kg/m   Estimated body mass index is 30.69 kg/m  as calculated from the following:    Height as of this encounter: 1.613 m (5' 3.5\").    Weight as of this encounter: 79.8 kg (176 lb).  Medication Reconciliation: complete    Cely Wiseman LPN      Princess Leone is a 81 year old female who presents to clinic today for a mole on her left posterior shoulder.  She is uncertain how long it has been there.  No bleeding, pain, irritation, itching.    HPI    I personally reviewed medications/allergies/history listed below:    Patient Active Problem List    Diagnosis Date Noted     Thrombosed hemorrhoids 06/28/2021     Priority: Medium     H/O adenomatous polyp of colon 06/28/2021     Priority: Medium     Sigmoid diverticulosis 06/28/2021     Priority: Medium     Rectal bleeding 06/22/2021     Priority: Medium     Osteopenia, unspecified location 06/24/2019     Priority: Medium     Allergic rhinitis 01/24/2018     Priority: Medium     Retinal vascular occlusion 01/24/2018     Priority: Medium     Overview:   Left eye subtotal central retinal vein occlusion.       Depression 01/24/2018     Priority: Medium     Dysthymia 01/24/2018     Priority: Medium     Hearing loss " 01/24/2018     Priority: Medium     Overview:   Mild       Hypothyroidism 01/24/2018     Priority: Medium     Symptomatic menopausal or female climacteric states 01/24/2018     Priority: Medium     Abnormal glucose 01/24/2018     Priority: Medium     Vaginitis, atrophic 01/24/2018     Priority: Medium     CKD (chronic kidney disease) 11/02/2016     Priority: Medium     Vitamin D deficiency 03/19/2015     Priority: Medium     Cerebral lesion 05/17/2012     Priority: Medium     Intermittent atrial fibrillation (H) 05/17/2012     Priority: Medium     Other nonspecific abnormal result of function study of brain and central nervous system 04/24/2012     Priority: Medium     Cerebral artery occlusion with cerebral infarction (H) 04/06/2012     Priority: Medium     Viral upper respiratory tract infection 01/20/2012     Priority: Medium     Abnormal electrocardiogram 06/09/2011     Priority: Medium     Hyperlipidemia 05/03/2010     Priority: Medium     Carpal tunnel syndrome 11/24/2003     Priority: Medium     Past Medical History:   Diagnosis Date     Cerebral infarction (H)     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  MRI of brain after head CT showed multiple enhancing lesions, concerning for metastatic illness.  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  Referred to ENT and oncology 5/1/12.     Depression      Hypothyroidism     1993     Other hyperlipidemia      Personal history of other medical treatment (CODE)     G3, P3-0-0-3 status post three NSVDs      Past Surgical History:   Procedure Laterality Date     COLONOSCOPY  2005    approximately 2005 in the [facility] - was normal.     COLONOSCOPY N/A 06/28/2021    F/U N/A tubular adenoma     Family History   Problem Relation Age of Onset     Heart Disease Mother         Heart Disease,D & C at 91/Heart  Disease,pacemaker     Hypertension Mother         Hypertension     Other - See Comments Father          at 76.  He had peripheral vascular disease. Bypass./COPD     Heart Disease Brother         Heart Disease, carotid disease and has gone through stenting     Other - See Comments Brother         tinnitis     Heart Disease Brother         Heart Disease,CABG x 3     Heart Surgery Son         Aortic Valve replacement due to bicuspid aortic valve.     Breast Cancer No family hx of         Cancer-breast     Social History     Tobacco Use     Smoking status: Never Smoker     Smokeless tobacco: Never Used   Substance Use Topics     Alcohol use: No     Social History     Social History Narrative    Retired from  teaching    3 grown kids and 5 grandkids    Lives with  in [place]    Caf[place] 1    Calcium good    Water some    She is retired.  She and her  relocated to the [facility] from [place], [place].      Kirk Son, 4/10/64      Sh    tiffanie Daughter, 67      Karen Daughter, 3/9/69       Brian -      Current Outpatient Medications   Medication Sig Dispense Refill     ARIPiprazole (ABILIFY) 10 MG tablet Take 0.5 tablets (5 mg) by mouth daily 30 tablet 1     Aspirin Buf,CaCarb-MgCarb-MgO, (BUFFERED ASPIRIN) 325 MG TABS Take 1 tablet by mouth daily       cholecalciferol (D 5000) 5000 UNITS CAPS Take one by mouth daily.  Have lab level of vitamin d rechecked after completion of 90 days.       dibucaine 1 % OINT rectal ointment Place rectally 3 times daily as needed 28 g 1     levothyroxine (SYNTHROID/LEVOTHROID) 88 MCG tablet Take 1 tablet (88 mcg) by mouth daily 90 tablet 3     nortriptyline (PAMELOR) 25 MG capsule Take 1 capsule by mouth 2 times daily       simvastatin (ZOCOR) 10 MG tablet Take 1 tablet (10 mg) by mouth daily 90 tablet 3     No Known Allergies    Review of Systems   Constitutional: Negative for chills and fever.   Respiratory: Negative for cough.   "  Cardiovascular: Negative for peripheral edema.   Psychiatric/Behavioral: Negative for mood changes. The patient is not nervous/anxious.         OBJECTIVE:     /70 (BP Location: Right arm, Patient Position: Sitting, Cuff Size: Adult Regular)   Pulse 100   Temp 98  F (36.7  C)   Resp 22   Ht 1.613 m (5' 3.5\")   Wt 79.8 kg (176 lb)   SpO2 100%   BMI 30.69 kg/m    Body mass index is 30.69 kg/m .  Physical Exam  Constitutional:       Appearance: Normal appearance.   Eyes:      Extraocular Movements: Extraocular movements intact.      Pupils: Pupils are equal, round, and reactive to light.   Skin:     Comments: On her left posterior shoulder, there is a rough, raised, brown lesion consistent with a seborrheic keratosis of about 5 mm in diameter.   Neurological:      Mental Status: She is alert.           PHQ-9 SCORE 6/24/2019 7/16/2020 6/8/2021   PHQ-9 Total Score 0 0 3       PHQ-2 Score:     PHQ-2 ( 1999 Pf[employer]) 7/12/2021 6/28/2021   Q1: Little interest or pleasure in doing things 0 0   Q2: Feeling down, depressed or hopeless 0 0   PHQ-2 Score 0 0       ANGIE-7 SCORE 6/24/2019   Total Score 0         I personally reviewed results withpatient as listed below:   Diagnostic Test Results:  none     ASSESSMENT/PLAN:       ICD-10-CM    1. Seborrheic keratosis  L82.1        The spot on Trina's left posterior shoulder is called a seborrheic keratosis.  These are benign skin lesions that form more often as a person gets older.  These do not turn into skin cancer and are harmless.  Occasionally, they can get irritated or inflamed.  If this happens, they can be removed.  However, if it is not causing symptoms it may be left alone without any concern.      Linda Moreno MD  [facility]        "

## 2021-07-12 NOTE — NURSING NOTE
"Chief Complaint   Patient presents with     Derm Problem     moles   Patient has mole on her left shoulder and by bra strap.  FOOD SECURITY SCREENING QUESTIONS  Hunger Vital Signs:  Within the past 12 months we worried whether our food would run out before we got money to buy more. Never  Within the past 12 months the food we bought just didn't last and we didn't have money to get more. Never  Cely Wiseman LPN 7/12/2021 10:58 AM      Initial /70 (BP Location: Right arm, Patient Position: Sitting, Cuff Size: Adult Regular)   Pulse 100   Temp 98  F (36.7  C)   Resp 22   Ht 1.613 m (5' 3.5\")   Wt 79.8 kg (176 lb)   SpO2 100%   BMI 30.69 kg/m   Estimated body mass index is 30.69 kg/m  as calculated from the following:    Height as of this encounter: 1.613 m (5' 3.5\").    Weight as of this encounter: 79.8 kg (176 lb).  Medication Reconciliation: complete    Cely Wiseman LPN  "

## 2021-10-09 ENCOUNTER — HEALTH MAINTENANCE LETTER (OUTPATIENT)
Age: 81
End: 2021-10-09

## 2021-10-12 ENCOUNTER — LAB (OUTPATIENT)
Dept: LAB | Facility: OTHER | Age: 81
End: 2021-10-12
Payer: MEDICARE

## 2021-10-12 DIAGNOSIS — F33.0 MAJOR DEPRESSIVE DISORDER, RECURRENT EPISODE, MILD (H): ICD-10-CM

## 2021-10-12 LAB
ALBUMIN SERPL-MCNC: 4.1 G/DL (ref 3.5–5.7)
ALP SERPL-CCNC: 55 U/L (ref 34–104)
ALT SERPL W P-5'-P-CCNC: 16 U/L (ref 7–52)
ANION GAP SERPL CALCULATED.3IONS-SCNC: 4 MMOL/L (ref 3–14)
AST SERPL W P-5'-P-CCNC: 19 U/L (ref 13–39)
BASOPHILS # BLD AUTO: 0.1 10E3/UL (ref 0–0.2)
BASOPHILS NFR BLD AUTO: 1 %
BILIRUB SERPL-MCNC: 0.4 MG/DL (ref 0.3–1)
BUN SERPL-MCNC: 12 MG/DL (ref 7–25)
CALCIUM SERPL-MCNC: 9.5 MG/DL (ref 8.6–10.3)
CHLORIDE BLD-SCNC: 102 MMOL/L (ref 98–107)
CHOLEST SERPL-MCNC: 170 MG/DL
CO2 SERPL-SCNC: 32 MMOL/L (ref 21–31)
CREAT SERPL-MCNC: 1.1 MG/DL (ref 0.6–1.2)
EOSINOPHIL # BLD AUTO: 0.2 10E3/UL (ref 0–0.7)
EOSINOPHIL NFR BLD AUTO: 2 %
ERYTHROCYTE [DISTWIDTH] IN BLOOD BY AUTOMATED COUNT: 12.7 % (ref 10–15)
FASTING STATUS PATIENT QL REPORTED: ABNORMAL
GFR SERPL CREATININE-BSD FRML MDRD: 47 ML/MIN/1.73M2
GLUCOSE BLD-MCNC: 89 MG/DL (ref 70–105)
HBA1C MFR BLD: 5.6 % (ref 4–6.2)
HCT VFR BLD AUTO: 45.4 % (ref 35–47)
HDLC SERPL-MCNC: 53 MG/DL (ref 23–92)
HGB BLD-MCNC: 15 G/DL (ref 11.7–15.7)
IMM GRANULOCYTES # BLD: 0 10E3/UL
IMM GRANULOCYTES NFR BLD: 0 %
LDLC SERPL CALC-MCNC: 84 MG/DL
LYMPHOCYTES # BLD AUTO: 3 10E3/UL (ref 0.8–5.3)
LYMPHOCYTES NFR BLD AUTO: 32 %
MCH RBC QN AUTO: 30.6 PG (ref 26.5–33)
MCHC RBC AUTO-ENTMCNC: 33 G/DL (ref 31.5–36.5)
MCV RBC AUTO: 93 FL (ref 78–100)
MONOCYTES # BLD AUTO: 0.7 10E3/UL (ref 0–1.3)
MONOCYTES NFR BLD AUTO: 8 %
NEUTROPHILS # BLD AUTO: 5.3 10E3/UL (ref 1.6–8.3)
NEUTROPHILS NFR BLD AUTO: 57 %
NONHDLC SERPL-MCNC: 117 MG/DL
NRBC # BLD AUTO: 0 10E3/UL
NRBC BLD AUTO-RTO: 0 /100
PLATELET # BLD AUTO: 276 10E3/UL (ref 150–450)
POTASSIUM BLD-SCNC: 3.8 MMOL/L (ref 3.5–5.1)
PROT SERPL-MCNC: 7.3 G/DL (ref 6.4–8.9)
RBC # BLD AUTO: 4.9 10E6/UL (ref 3.8–5.2)
SODIUM SERPL-SCNC: 138 MMOL/L (ref 134–144)
TRIGL SERPL-MCNC: 165 MG/DL
WBC # BLD AUTO: 9.3 10E3/UL (ref 4–11)

## 2021-10-12 PROCEDURE — 82040 ASSAY OF SERUM ALBUMIN: CPT | Mod: ZL

## 2021-10-12 PROCEDURE — 85025 COMPLETE CBC W/AUTO DIFF WBC: CPT | Mod: ZL

## 2021-10-12 PROCEDURE — 80335 ANTIDEPRESSANT TRICYCLIC 1/2: CPT | Mod: ZL

## 2021-10-12 PROCEDURE — 36415 COLL VENOUS BLD VENIPUNCTURE: CPT | Mod: ZL

## 2021-10-12 PROCEDURE — 80061 LIPID PANEL: CPT | Mod: ZL

## 2021-10-12 PROCEDURE — 83036 HEMOGLOBIN GLYCOSYLATED A1C: CPT | Mod: ZL

## 2021-10-15 ENCOUNTER — ALLIED HEALTH/NURSE VISIT (OUTPATIENT)
Dept: FAMILY MEDICINE | Facility: OTHER | Age: 81
End: 2021-10-15
Attending: FAMILY MEDICINE
Payer: MEDICARE

## 2021-10-15 DIAGNOSIS — Z23 NEED FOR PROPHYLACTIC VACCINATION AND INOCULATION AGAINST INFLUENZA: Primary | ICD-10-CM

## 2021-10-15 DIAGNOSIS — E78.5 HYPERLIPIDEMIA, UNSPECIFIED HYPERLIPIDEMIA TYPE: ICD-10-CM

## 2021-10-15 PROCEDURE — 90662 IIV NO PRSV INCREASED AG IM: CPT

## 2021-10-15 PROCEDURE — G0008 ADMIN INFLUENZA VIRUS VAC: HCPCS

## 2021-10-15 NOTE — PROGRESS NOTES
Fluzone 65+ administered per patient/parent request.    Mahi Cedillo RN  ....................  10/15/2021   1:49 PM

## 2021-10-18 RX ORDER — SIMVASTATIN 10 MG
TABLET ORAL
Qty: 90 TABLET | Refills: 0 | Status: SHIPPED | OUTPATIENT
Start: 2021-10-18 | End: 2022-03-02

## 2021-10-18 NOTE — TELEPHONE ENCOUNTER
"[facility]  sent Rx request for the following:     Requested Prescriptions   Pending Prescriptions Disp Refills     simvastatin (ZOCOR) 10 MG tablet [Pharmacy Med Name: Simvastatin 10 MG Oral Tablet] 90 tablet 0     Sig: Take 1 tablet by mouth once daily        Last Prescription Date:   7/16/2020  Last Fill Qty/Refills:         90, R-3    Last Office Visit:              7/12/2021  Future Office visit:           11/9/2021       Statins Protocol Passed - 10/15/2021 10:25 AM        Passed - LDL on file in past 12 months     Recent Labs   Lab Test 10/12/21  1341   LDL 84             Passed - No abnormal creatine kinase in past 12 months     No lab results found.             Passed - Recent (12 mo) or future (30 days) visit within the authorizing provider's specialty     Patient has had an office visit with the authorizing provider or a provider within the authorizing providers department within the previous 12 mos or has a future within next 30 days. See \"Patient Info\" tab in inbasket, or \"Choose Columns\" in Meds & Orders section of the refill encounter.              Passed - Medication is active on med list        Passed - Patient is age 18 or older        Passed - No active pregnancy on record        Passed - No positive pregnancy test in past 12 months             "

## 2021-10-20 LAB — NORTRIP SERPL-MCNC: 55 NG/ML

## 2021-10-26 ENCOUNTER — ALLIED HEALTH/NURSE VISIT (OUTPATIENT)
Dept: FAMILY MEDICINE | Facility: OTHER | Age: 81
End: 2021-10-26
Payer: COMMERCIAL

## 2021-10-26 DIAGNOSIS — Z79.899 ENCOUNTER FOR LONG-TERM (CURRENT) DRUG USE: Primary | ICD-10-CM

## 2021-10-26 LAB
ATRIAL RATE - MUSE: 102 BPM
DIASTOLIC BLOOD PRESSURE - MUSE: NORMAL MMHG
INTERPRETATION ECG - MUSE: NORMAL
P AXIS - MUSE: 78 DEGREES
PR INTERVAL - MUSE: 202 MS
QRS DURATION - MUSE: 86 MS
QT - MUSE: 336 MS
QTC - MUSE: 437 MS
R AXIS - MUSE: -53 DEGREES
SYSTOLIC BLOOD PRESSURE - MUSE: NORMAL MMHG
T AXIS - MUSE: 66 DEGREES
VENTRICULAR RATE- MUSE: 102 BPM

## 2021-10-26 PROCEDURE — G0463 HOSPITAL OUTPT CLINIC VISIT: HCPCS | Mod: 25

## 2021-10-26 PROCEDURE — 93005 ELECTROCARDIOGRAM TRACING: CPT

## 2021-10-26 PROCEDURE — 93010 ELECTROCARDIOGRAM REPORT: CPT | Performed by: INTERNAL MEDICINE

## 2021-10-26 NOTE — PROGRESS NOTES
Pt presented to nurse injection room to get an EKG. EKG ordered by ANA PAULA Phan with [facility]. EKG performed and tracing faxed to ordering provider. EKG transmitted and imported to [employer], then tracing and written order sent for scanning into Epic.     Mahi eCdillo RN ....................  10/26/2021   1:22 PM

## 2021-11-09 ENCOUNTER — HOSPITAL ENCOUNTER (EMERGENCY)
Facility: OTHER | Age: 81
Discharge: HOME | End: 2021-11-09
Attending: PHYSICIAN ASSISTANT | Admitting: PHYSICIAN ASSISTANT
Payer: MEDICARE

## 2021-11-09 ENCOUNTER — ANESTHESIA (OUTPATIENT)
Dept: EMERGENCY MEDICINE | Facility: OTHER | Age: 81
End: 2021-11-09
Payer: MEDICARE

## 2021-11-09 ENCOUNTER — APPOINTMENT (OUTPATIENT)
Dept: CT IMAGING | Facility: OTHER | Age: 81
End: 2021-11-09
Attending: PHYSICIAN ASSISTANT
Payer: MEDICARE

## 2021-11-09 ENCOUNTER — ANESTHESIA EVENT (OUTPATIENT)
Dept: EMERGENCY MEDICINE | Facility: OTHER | Age: 81
End: 2021-11-09
Payer: MEDICARE

## 2021-11-09 ENCOUNTER — APPOINTMENT (OUTPATIENT)
Dept: MRI IMAGING | Facility: OTHER | Age: 81
End: 2021-11-09
Attending: PHYSICIAN ASSISTANT
Payer: MEDICARE

## 2021-11-09 VITALS
RESPIRATION RATE: 18 BRPM | SYSTOLIC BLOOD PRESSURE: 132 MMHG | BODY MASS INDEX: 32.34 KG/M2 | TEMPERATURE: 98.1 F | HEIGHT: 63 IN | WEIGHT: 182.5 LBS | OXYGEN SATURATION: 97 % | HEART RATE: 109 BPM | DIASTOLIC BLOOD PRESSURE: 75 MMHG

## 2021-11-09 DIAGNOSIS — R79.89 ELEVATED TROPONIN: ICD-10-CM

## 2021-11-09 DIAGNOSIS — G45.9 TIA (TRANSIENT ISCHEMIC ATTACK): ICD-10-CM

## 2021-11-09 DIAGNOSIS — I10 ESSENTIAL HYPERTENSION: ICD-10-CM

## 2021-11-09 DIAGNOSIS — E83.42 HYPOMAGNESEMIA: ICD-10-CM

## 2021-11-09 LAB
ALBUMIN UR-MCNC: NEGATIVE MG/DL
ANION GAP SERPL CALCULATED.3IONS-SCNC: 8 MMOL/L (ref 3–14)
APPEARANCE UR: CLEAR
APTT PPP: 31 SECONDS (ref 22–38)
ATRIAL RATE - MUSE: 102 BPM
ATRIAL RATE - MUSE: 104 BPM
BASOPHILS # BLD AUTO: 0 10E3/UL (ref 0–0.2)
BASOPHILS NFR BLD AUTO: 0 %
BILIRUB UR QL STRIP: NEGATIVE
BUN SERPL-MCNC: 14 MG/DL (ref 7–25)
CALCIUM SERPL-MCNC: 9.8 MG/DL (ref 8.6–10.3)
CHLORIDE BLD-SCNC: 100 MMOL/L (ref 98–107)
CO2 SERPL-SCNC: 26 MMOL/L (ref 21–31)
COLOR UR AUTO: YELLOW
CREAT SERPL-MCNC: 1.03 MG/DL (ref 0.6–1.2)
DIASTOLIC BLOOD PRESSURE - MUSE: NORMAL MMHG
DIASTOLIC BLOOD PRESSURE - MUSE: NORMAL MMHG
EOSINOPHIL # BLD AUTO: 0 10E3/UL (ref 0–0.7)
EOSINOPHIL NFR BLD AUTO: 0 %
ERYTHROCYTE [DISTWIDTH] IN BLOOD BY AUTOMATED COUNT: 12.7 % (ref 10–15)
GFR SERPL CREATININE-BSD FRML MDRD: 51 ML/MIN/1.73M2
GLUCOSE BLD-MCNC: 123 MG/DL (ref 70–105)
GLUCOSE UR STRIP-MCNC: NEGATIVE MG/DL
HCT VFR BLD AUTO: 43.9 % (ref 35–47)
HGB BLD-MCNC: 14.7 G/DL (ref 11.7–15.7)
HGB UR QL STRIP: NEGATIVE
HOLD SPECIMEN: NORMAL
HOLD SPECIMEN: NORMAL
IMM GRANULOCYTES # BLD: 0 10E3/UL
IMM GRANULOCYTES NFR BLD: 0 %
INR PPP: 0.99 (ref 0.85–1.15)
INTERPRETATION ECG - MUSE: NORMAL
INTERPRETATION ECG - MUSE: NORMAL
KETONES UR STRIP-MCNC: NEGATIVE MG/DL
LEUKOCYTE ESTERASE UR QL STRIP: NEGATIVE
LYMPHOCYTES # BLD AUTO: 1.1 10E3/UL (ref 0.8–5.3)
LYMPHOCYTES NFR BLD AUTO: 10 %
MAGNESIUM SERPL-MCNC: 1.7 MG/DL (ref 1.9–2.7)
MCH RBC QN AUTO: 30.6 PG (ref 26.5–33)
MCHC RBC AUTO-ENTMCNC: 33.5 G/DL (ref 31.5–36.5)
MCV RBC AUTO: 92 FL (ref 78–100)
MONOCYTES # BLD AUTO: 0.5 10E3/UL (ref 0–1.3)
MONOCYTES NFR BLD AUTO: 5 %
NEUTROPHILS # BLD AUTO: 8.6 10E3/UL (ref 1.6–8.3)
NEUTROPHILS NFR BLD AUTO: 85 %
NITRATE UR QL: NEGATIVE
NRBC # BLD AUTO: 0 10E3/UL
NRBC BLD AUTO-RTO: 0 /100
P AXIS - MUSE: 70 DEGREES
P AXIS - MUSE: 72 DEGREES
PH UR STRIP: 6 [PH] (ref 5–9)
PLATELET # BLD AUTO: 268 10E3/UL (ref 150–450)
POTASSIUM BLD-SCNC: 4 MMOL/L (ref 3.5–5.1)
PR INTERVAL - MUSE: 186 MS
PR INTERVAL - MUSE: 190 MS
QRS DURATION - MUSE: 90 MS
QRS DURATION - MUSE: 96 MS
QT - MUSE: 342 MS
QT - MUSE: 346 MS
QTC - MUSE: 449 MS
QTC - MUSE: 450 MS
R AXIS - MUSE: -52 DEGREES
R AXIS - MUSE: -54 DEGREES
RBC # BLD AUTO: 4.8 10E6/UL (ref 3.8–5.2)
RBC URINE: 0 /HPF
SODIUM SERPL-SCNC: 134 MMOL/L (ref 134–144)
SP GR UR STRIP: 1.01 (ref 1–1.03)
SYSTOLIC BLOOD PRESSURE - MUSE: NORMAL MMHG
SYSTOLIC BLOOD PRESSURE - MUSE: NORMAL MMHG
T AXIS - MUSE: 48 DEGREES
T AXIS - MUSE: 56 DEGREES
TROPONIN I SERPL-MCNC: 45.3 PG/ML (ref 0–34)
TROPONIN I SERPL-MCNC: 52 PG/ML (ref 0–34)
TROPONIN I SERPL-MCNC: 54.6 PG/ML (ref 0–34)
UROBILINOGEN UR STRIP-MCNC: NORMAL MG/DL
VENTRICULAR RATE- MUSE: 102 BPM
VENTRICULAR RATE- MUSE: 104 BPM
WBC # BLD AUTO: 10.3 10E3/UL (ref 4–11)
WBC URINE: 0 /HPF

## 2021-11-09 PROCEDURE — 81001 URINALYSIS AUTO W/SCOPE: CPT | Performed by: PHYSICIAN ASSISTANT

## 2021-11-09 PROCEDURE — 70450 CT HEAD/BRAIN W/O DYE: CPT

## 2021-11-09 PROCEDURE — 250N000013 HC RX MED GY IP 250 OP 250 PS 637: Performed by: PHYSICIAN ASSISTANT

## 2021-11-09 PROCEDURE — 85610 PROTHROMBIN TIME: CPT | Performed by: PHYSICIAN ASSISTANT

## 2021-11-09 PROCEDURE — 70551 MRI BRAIN STEM W/O DYE: CPT

## 2021-11-09 PROCEDURE — 250N000011 HC RX IP 250 OP 636: Performed by: PHYSICIAN ASSISTANT

## 2021-11-09 PROCEDURE — 99285 EMERGENCY DEPT VISIT HI MDM: CPT | Mod: 25 | Performed by: PHYSICIAN ASSISTANT

## 2021-11-09 PROCEDURE — 70496 CT ANGIOGRAPHY HEAD: CPT

## 2021-11-09 PROCEDURE — 85730 THROMBOPLASTIN TIME PARTIAL: CPT | Performed by: PHYSICIAN ASSISTANT

## 2021-11-09 PROCEDURE — 80048 BASIC METABOLIC PNL TOTAL CA: CPT | Performed by: PHYSICIAN ASSISTANT

## 2021-11-09 PROCEDURE — G0426 INPT/ED TELECONSULT50: HCPCS | Mod: G0 | Performed by: PSYCHIATRY & NEUROLOGY

## 2021-11-09 PROCEDURE — 36415 COLL VENOUS BLD VENIPUNCTURE: CPT | Performed by: PHYSICIAN ASSISTANT

## 2021-11-09 PROCEDURE — 93010 ELECTROCARDIOGRAM REPORT: CPT | Mod: 76 | Performed by: INTERNAL MEDICINE

## 2021-11-09 PROCEDURE — 84484 ASSAY OF TROPONIN QUANT: CPT | Mod: 91 | Performed by: PHYSICIAN ASSISTANT

## 2021-11-09 PROCEDURE — 36410 VNPNXR 3YR/> PHY/QHP DX/THER: CPT | Performed by: NURSE ANESTHETIST, CERTIFIED REGISTERED

## 2021-11-09 PROCEDURE — 85025 COMPLETE CBC W/AUTO DIFF WBC: CPT | Performed by: PHYSICIAN ASSISTANT

## 2021-11-09 PROCEDURE — 96374 THER/PROPH/DIAG INJ IV PUSH: CPT | Performed by: PHYSICIAN ASSISTANT

## 2021-11-09 PROCEDURE — 83735 ASSAY OF MAGNESIUM: CPT | Performed by: PHYSICIAN ASSISTANT

## 2021-11-09 PROCEDURE — 36416 COLLJ CAPILLARY BLOOD SPEC: CPT | Performed by: PHYSICIAN ASSISTANT

## 2021-11-09 PROCEDURE — 99284 EMERGENCY DEPT VISIT MOD MDM: CPT | Performed by: PHYSICIAN ASSISTANT

## 2021-11-09 PROCEDURE — 93005 ELECTROCARDIOGRAM TRACING: CPT | Mod: 76 | Performed by: PHYSICIAN ASSISTANT

## 2021-11-09 PROCEDURE — 250N000009 HC RX 250: Performed by: PHYSICIAN ASSISTANT

## 2021-11-09 PROCEDURE — 93005 ELECTROCARDIOGRAM TRACING: CPT | Performed by: PHYSICIAN ASSISTANT

## 2021-11-09 PROCEDURE — 250N000009 HC RX 250: Performed by: NURSE ANESTHETIST, CERTIFIED REGISTERED

## 2021-11-09 RX ORDER — MAGNESIUM OXIDE 400 MG/1
800 TABLET ORAL ONCE
Status: COMPLETED | OUTPATIENT
Start: 2021-11-09 | End: 2021-11-09

## 2021-11-09 RX ORDER — METOPROLOL SUCCINATE 25 MG/1
25 TABLET, EXTENDED RELEASE ORAL DAILY
Qty: 30 TABLET | Refills: 0 | Status: SHIPPED | OUTPATIENT
Start: 2021-11-09 | End: 2023-05-16

## 2021-11-09 RX ORDER — LIDOCAINE HYDROCHLORIDE 10 MG/ML
INJECTION, SOLUTION INFILTRATION; PERINEURAL PRN
Status: DISCONTINUED | OUTPATIENT
Start: 2021-11-09 | End: 2021-11-09

## 2021-11-09 RX ORDER — IOPAMIDOL 755 MG/ML
100 INJECTION, SOLUTION INTRAVASCULAR ONCE
Status: COMPLETED | OUTPATIENT
Start: 2021-11-09 | End: 2021-11-09

## 2021-11-09 RX ORDER — ENALAPRILAT 1.25 MG/ML
1.25 INJECTION INTRAVENOUS ONCE
Status: COMPLETED | OUTPATIENT
Start: 2021-11-09 | End: 2021-11-09

## 2021-11-09 RX ORDER — LISINOPRIL 5 MG/1
5 TABLET ORAL DAILY
Qty: 30 TABLET | Refills: 0 | Status: SHIPPED | OUTPATIENT
Start: 2021-11-09 | End: 2021-11-09 | Stop reason: ALTCHOICE

## 2021-11-09 RX ADMIN — Medication 800 MG: at 14:49

## 2021-11-09 RX ADMIN — LIDOCAINE HYDROCHLORIDE 0.2 MG: 10 INJECTION, SOLUTION INFILTRATION; PERINEURAL at 12:50

## 2021-11-09 RX ADMIN — ENALAPRILAT 1.25 MG: 1.25 INJECTION INTRAVENOUS at 14:49

## 2021-11-09 RX ADMIN — IOPAMIDOL 100 ML: 755 INJECTION, SOLUTION INTRAVENOUS at 12:57

## 2021-11-09 ASSESSMENT — ENCOUNTER SYMPTOMS
NECK PAIN: 0
BACK PAIN: 0
TREMORS: 0
FLANK PAIN: 0
ABDOMINAL PAIN: 0
FACIAL SWELLING: 0
STRIDOR: 0
DECREASED CONCENTRATION: 1
NAUSEA: 0
FEVER: 0
HEADACHES: 1
WEAKNESS: 1
SEIZURES: 0
AGITATION: 0
VOMITING: 0
EYE PAIN: 0
SORE THROAT: 0
WHEEZING: 0

## 2021-11-09 ASSESSMENT — MIFFLIN-ST. JEOR: SCORE: 1261.94

## 2021-11-09 NOTE — ED NOTES
Pt down to CT for scan.  IV flushed, pt reported discomfort.   Flushed with ease x 2, pt continued to report discomfort. Sluggish blood return.     [facility] called.

## 2021-11-09 NOTE — ED TRIAGE NOTES
"EMS Arrival Note  ________________________________  Princess Leone is a 81 year old Female that arrives via [facility]  Pre hospital clinical presentation per EMS and spouse personnel includes Pt found lying on the floor in hallway to the bathroom by . Reports she was unable to get up on her own. Denies hitting her head or LOC.  states she seemed \"lethargic\" after he assisted her back to the bed. Received her Covid booster yesterday. No sick contacts. Dizzy episode 5 days ago.   Pre hospital personnel report vital signs of:  VSS    Patient arrives with:  GCS Total = 14  Airway intact  Breathing Assessment Normal  Circulation Assessment Normal  Patient arrives with a 20 g IV at her left wrist    Placed in room 904, gowned, warm blanket provided, side rails up,  ID verified and band placed, and call light within reach.       Previous living situation Spouse  "

## 2021-11-09 NOTE — ED PROVIDER NOTES
History     Chief Complaint   Patient presents with     Fall     Dizziness     HPI  Princess Leone is a 81 year old female who apparently this morning fell on the bathroom.  She had unwitnessed fall.  She denies hitting her head.  She has had increasing lethargy and weakness more pronounced today.  Her  had difficulty getting her back off the floor and EMS was called.  She has had some word finding difficulty but answers questions appropriately.  Her speech is very slow and deliberate.      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       Depression 01/24/2018     Priority: Medium     Dysthymia 01/24/2018     Priority: Medium     Hearing loss 01/24/2018     Priority: Medium     Overview:   Mild       Hypothyroidism 01/24/2018     Priority: Medium     Symptomatic menopausal or female climacteric states 01/24/2018     Priority: Medium     Abnormal glucose 01/24/2018     Priority: Medium     Vaginitis, atrophic 01/24/2018     Priority: Medium     CKD (chronic kidney disease) 11/02/2016     Priority: Medium     Vitamin D deficiency 03/19/2015     Priority: Medium     Cerebral lesion 05/17/2012     Priority: Medium     Intermittent atrial fibrillation (H) 05/17/2012     Priority: Medium     Other nonspecific abnormal result of function study of brain and central nervous system 04/24/2012     Priority: Medium     Cerebral artery occlusion with cerebral infarction (H)  2012     Priority: Medium     Viral upper respiratory tract infection 2012     Priority: Medium     Abnormal electrocardiogram 2011     Priority: Medium     Hyperlipidemia 2010     Priority: Medium     Carpal tunnel syndrome 2003     Priority: Medium        Past Medical History:    Past Medical History:   Diagnosis Date     Cerebral infarction (H)      Depression      Hypothyroidism      Other hyperlipidemia      Personal history of other medical treatment (CODE)        Past Surgical History:    Past Surgical History:   Procedure Laterality Date     COLONOSCOPY      approximately  in the [facility] - was normal.     COLONOSCOPY N/A 2021    F/U N/A tubular adenoma       Family History:    Family History   Problem Relation Age of Onset     Heart Disease Mother         Heart Disease,D & C at 91/Heart Disease,pacemaker     Hypertension Mother         Hypertension     Other - See Comments Father          at 76.  He had peripheral vascular disease. Bypass./COPD     Heart Disease Brother         Heart Disease, carotid disease and has gone through stenting     Other - See Comments Brother         tinnitis     Heart Disease Brother         Heart Disease,CABG x 3     Heart Surgery Son         Aortic Valve replacement due to bicuspid aortic valve.     Breast Cancer No family hx of         Cancer-breast       Social History:  Marital Status:   [2]  Social History     Tobacco Use     Smoking status: Never Smoker     Smokeless tobacco: Never Used   Vaping Use     Vaping Use: Never used   Substance Use Topics     Alcohol use: No     Drug use: Never     Comment: Drug use: No        Medications:    ARIPiprazole (ABILIFY) 10 MG tablet  Aspirin Buf,CaCarb-MgCarb-MgO, (BUFFERED ASPIRIN) 325 MG TABS  cholecalciferol (D 5000) 5000 UNITS CAPS  dibucaine 1 % OINT rectal ointment  levothyroxine (SYNTHROID/LEVOTHROID) 88 MCG tablet  nortriptyline (PAMELOR) 25 MG capsule  simvastatin  "(ZOCOR) 10 MG tablet          Review of Systems   Constitutional: Negative for fever.   HENT: Negative for facial swelling and sore throat.    Eyes: Negative for pain.   Respiratory: Negative for wheezing and stridor.    Cardiovascular: Negative for chest pain.   Gastrointestinal: Negative for abdominal pain, nausea and vomiting.   Genitourinary: Negative for flank pain.   Musculoskeletal: Negative for back pain and neck pain.   Skin: Negative for pallor.   Neurological: Positive for weakness and headaches. Negative for tremors and seizures.   Psychiatric/Behavioral: Positive for decreased concentration. Negative for agitation.   All other systems reviewed and are negative.      Physical Exam   BP: (!) 153/100  Pulse: 105  Temp: 98.1  F (36.7  C)  Resp: 12  Height: 160 cm (5' 3\")  Weight: 82.8 kg (182 lb 8 oz)  SpO2: 98 %      Physical Exam  Vitals and nursing note reviewed.   Constitutional:       General: She is not in acute distress.     Appearance: Normal appearance. She is not ill-appearing or toxic-appearing.   HENT:      Head: Normocephalic. No raccoon eyes, right periorbital erythema or left periorbital erythema.      Right Ear: No drainage or tenderness.      Left Ear: No drainage or tenderness.      Nose: Nose normal.   Eyes:      General: Lids are normal. Gaze aligned appropriately. No scleral icterus.     Extraocular Movements: Extraocular movements intact.      Comments: Right pupil slightly more dilated than the left but reactive   Neck:      Trachea: No tracheal deviation.   Cardiovascular:      Rate and Rhythm: Normal rate.   Pulmonary:      Effort: Pulmonary effort is normal. No respiratory distress.      Breath sounds: No stridor. No wheezing.   Abdominal:      Tenderness: There is no abdominal tenderness.   Musculoskeletal:         General: No deformity or signs of injury. Normal range of motion.      Cervical back: Normal range of motion. No signs of trauma.   Skin:     General: Skin is warm and " dry.      Coloration: Skin is not jaundiced or pale.   Neurological:      General: No focal deficit present.      Mental Status: She is oriented to person, place, and time. She is lethargic.      GCS: GCS eye subscore is 4. GCS verbal subscore is 5. GCS motor subscore is 6.      Cranial Nerves: Facial asymmetry present.      Motor: Weakness present. No tremor or seizure activity.      Comments: Word finding issues noted.  Slow deliberate speech but oriented.  Noted slight weakness to the left side.  When smiling left facial droop..  Her spouse states this is from previous CVA.   Psychiatric:         Attention and Perception: Attention normal.         Mood and Affect: Mood normal.         ED Course     EKG shows sinus tachycardia.  Left axis deviation.  Inferior infarct, age undetermined.  Cannot rule out anterior infarct, age undetermined.  This is essentially unchanged from previous EKG on 10/22/2021 however the T wave today in V2 is upright where previously it was inverted.    National Institutes [employer] Stroke Scale  Exam Interval: Baseline   Score    Level of consciousness: (0)   Alert, keenly responsive    LOC questions: (0)   Answers both questions correctly    LOC commands: (0)   Performs both tasks correctly    Best gaze: (0)   Normal    Visual: (0)   No visual loss    Facial palsy: (1)   Minor paralysis (flat nasolabial fold, smile asymmetry)    Motor arm (left): (0)   No drift    Motor arm (right): (0)   No drift    Motor leg (left): (0)   No drift    Motor leg (right): (0)   No drift    Limb ataxia: (0)   Absent    Sensory: (0)   Normal- no sensory loss    Best language: (0)   Normal- no aphasia    Dysarthria: (1)   Mild to moderate dysarthria    Extinction and inattention: (0)   No abnormality        Total Score:  2        National Institutes of [employer] Stroke Scale  Exam Interval: 2 hours post treatment   Score    Level of consciousness: (0)   Alert, keenly responsive    LOC questions: (0)   Answers  both questions correctly    LOC commands: (0)   Performs both tasks correctly    Best gaze: (0)   Normal    Visual: (0)   No visual loss    Facial palsy: (0)   Normal symmetrical movements    Motor arm (left): (0)   No drift    Motor arm (right): (0)   No drift    Motor leg (left): (0)   No drift    Motor leg (right): (0)   No drift    Limb ataxia: (0)   Absent    Sensory: (0)   Normal- no sensory loss    Best language: (0)   Normal- no aphasia    Dysarthria: (0)   Normal    Extinction and inattention: (0)   No abnormality        Total Score:  0       Results for orders placed or performed during the hospital encounter of 11/09/21 (from the past 24 hour(s))   EKG 12 lead   Result Value Ref Range    Systolic Blood Pressure  mmHg    Diastolic Blood Pressure  mmHg    Ventricular Rate 104 BPM    Atrial Rate 104 BPM    SC Interval 190 ms    QRS Duration 96 ms     ms    QTc 449 ms    P Axis 70 degrees    R AXIS -54 degrees    T Axis 56 degrees    Interpretation ECG       Sinus tachycardia  Possible Left atrial enlargement  Left axis deviation  Inferior infarct , age undetermined  Cannot rule out Anterior infarct (cited on or before 08-JUN-2021)  Abnormal ECG  When compared with ECG of 26-OCT-2021 13:29,  No significant change was found  Confirmed by MD CESIA, GABINO ([id number]) on 11/9/2021 11:55:02 AM     CBC with Platelets & Differential    Narrative    The following orders were created for panel order CBC with Platelets & Differential.  Procedure                               Abnormality         Status                     ---------                               -----------         ------                     CBC with platelets and d...[[id number]]  Abnormal            Final result                 Please view results for these tests on the individual orders.   Basic metabolic panel   Result Value Ref Range    Sodium 134 134 - 144 mmol/L    Potassium 4.0 3.5 - 5.1 mmol/L    Chloride 100 98 - 107 mmol/L    Carbon Dioxide  (CO2) 26 21 - 31 mmol/L    Anion Gap 8 3 - 14 mmol/L    Urea Nitrogen 14 7 - 25 mg/dL    Creatinine 1.03 0.60 - 1.20 mg/dL    Calcium 9.8 8.6 - 10.3 mg/dL    Glucose 123 (H) 70 - 105 mg/dL    GFR Estimate 51 (L) >60 mL/min/1.73m2   INR   Result Value Ref Range    INR 0.99 0.85 - 1.15   Partial thromboplastin time   Result Value Ref Range    aPTT 31 22 - 38 Seconds   Troponin I   Result Value Ref Range    Troponin I 52.0 (H) 0.0 - 34.0 pg/mL   CBC with platelets and differential   Result Value Ref Range    WBC Count 10.3 4.0 - 11.0 10e3/uL    RBC Count 4.80 3.80 - 5.20 10e6/uL    Hemoglobin 14.7 11.7 - 15.7 g/dL    Hematocrit 43.9 35.0 - 47.0 %    MCV 92 78 - 100 fL    MCH 30.6 26.5 - 33.0 pg    MCHC 33.5 31.5 - 36.5 g/dL    RDW 12.7 10.0 - 15.0 %    Platelet Count 268 150 - 450 10e3/uL    % Neutrophils 85 %    % Lymphocytes 10 %    % Monocytes 5 %    % Eosinophils 0 %    % Basophils 0 %    % Immature Granulocytes 0 %    NRBCs per 100 WBC 0 <1 /100    Absolute Neutrophils 8.6 (H) 1.6 - 8.3 10e3/uL    Absolute Lymphocytes 1.1 0.8 - 5.3 10e3/uL    Absolute Monocytes 0.5 0.0 - 1.3 10e3/uL    Absolute Eosinophils 0.0 0.0 - 0.7 10e3/uL    Absolute Basophils 0.0 0.0 - 0.2 10e3/uL    Absolute Immature Granulocytes 0.0 <=0.0 10e3/uL    Absolute NRBCs 0.0 10e3/uL   Magnesium   Result Value Ref Range    Magnesium 1.7 (L) 1.9 - 2.7 mg/dL   CT Head w/o Contrast    Narrative    PROCEDURE: CT HEAD W/O CONTRAST     HISTORY: Code Stroke to evaluate for potential thrombolysis and  thrombectomy.  PLEASE READ IMMEDIATELY..    COMPARISON: MR brain 10/24/2020    TECHNIQUE:  Helical images of the head from the foramen magnum to the  vertex were obtained without contrast.    FINDINGS: The ventricles and sulci are prominent, compatible with  moderate to advanced, generalized volume loss. No acute intracranial  hemorrhage, mass effect, midline shift, hydrocephalus or basilar  cystern effacement are present.    Focal chronic infarcts of the  left parietal and right frontal lobes  are redemonstrated. No new transcortical ischemia is identified.    The calvarium is intact. The mastoid air cells are clear.  The  visualized paranasal sinuses are clear.      Impression    IMPRESSION: No acute intracranial hemorrhage or CT evidence of acute  transcortical ischemia.      Finding was identified on 11/9/2021 10:52 AM.     RICKEY Garcia was contacted by me on 11/9/2021 10:53 AM and verbalized  understanding of the critical result.     DOMINICK MUNOZ MD         SYSTEM ID:  [id number]   EKG 12-lead, tracing only   Result Value Ref Range    Systolic Blood Pressure  mmHg    Diastolic Blood Pressure  mmHg    Ventricular Rate 102 BPM    Atrial Rate 102 BPM    MO Interval 186 ms    QRS Duration 90 ms     ms    QTc 450 ms    P Axis 72 degrees    R AXIS -52 degrees    T Axis 48 degrees    Interpretation ECG       Sinus tachycardia  Left axis deviation  Inferior infarct (cited on or before 26-OCT-2021)  Cannot rule out Anterior infarct (cited on or before 08-JUN-2021)  Abnormal ECG  When compared with ECG of 09-NOV-2021 09:49, (unconfirmed)  No significant change was found  Confirmed by MD CESIA, [facility] ([id number]) on 11/9/2021 11:55:19 AM     UA with Microscopic reflex to Culture    Specimen: Urine, Clean Catch   Result Value Ref Range    Color Urine Yellow Colorless, Straw, Light Yellow, Yellow    Appearance Urine Clear Clear    Glucose Urine Negative Negative mg/dL    Bilirubin Urine Negative Negative    Ketones Urine Negative Negative mg/dL    Specific Gravity Urine 1.015 1.005 - 1.030    Blood Urine Negative Negative    pH Urine 6.0 5.0 - 9.0    Protein Albumin Urine Negative Negative mg/dL    Urobilinogen Urine Normal Normal, 2.0 mg/dL    Nitrite Urine Negative Negative    Leukocyte Esterase Urine Negative Negative    RBC Urine 0 <=2 /HPF    WBC Urine 0 <=5 /HPF    Narrative    Urine Culture not indicated   Troponin I (second draw)   Result Value Ref Range     Troponin I 54.6 (H) 0.0 - 34.0 pg/mL   Extra Tube ([place] Draw)    Narrative    The following orders were created for panel order Extra Tube ([place] Draw).  Procedure                               Abnormality         Status                     ---------                               -----------         ------                     Extra Serum Separator Tu...[[id number]]                      Final result               Extra Purple Top Tube[[id number]]                            Final result                 Please view results for these tests on the individual orders.   Extra Serum Separator Tube (SST)   Result Value Ref Range    Hold Specimen JIC    Extra Purple Top Tube   Result Value Ref Range    Hold Specimen JIC    CTA Head Neck with Contrast    Narrative    PROCEDURE: CTA  HEAD NECK WITH CONTRAST 11/9/2021 12:57 PM    HISTORY: Code Stroke to evaluate for potential thrombolysis and  thrombectomy.  PLEASE READ IMMEDIATELY.    COMPARISONS: None.    Meds/Dose Given:    TECHNIQUE: CT angiogram of the neck and CT angiogram of the brain both  with sagittal and coronal MIPS reconstructions    FINDINGS: CT angiogram of the neck: The brachiocephalic artery is  widely patent. The right subclavian and right vertebral arteries are  widely patent. The right common carotid artery is widely patent. There  is some calcific plaquing without stenosis at the right carotid  bifurcation. The right internal carotid artery is widely patent to the  skull base. The left common carotid artery is widely patent. There is  atherosclerotic plaquing at the left carotid bifurcation without  stenosis. The left internal carotid artery is widely patent to the  skull base. The left subclavian and left vertebral arteries are widely  patent.    CT angiogram of the neck: The distal vertebral basilar superior  cerebellar and posterior cerebral arteries are widely patent. The  carotid siphons are widely patent. The supraclinoid internal carotid  anterior  and middle cerebral arteries are normal. No intracranial  stenoses occlusions or aneurysms are seen.    The muscles of mastication and the parapharyngeal spaces are normal.  The salivary glands are normal. The larynx and upper trachea is  normal. The thyroid gland is normal. Cervical and supraclavicular  lymph nodes are normal.         Impression    IMPRESSION: No hemodynamically significant stenoses or occlusions are  identified in the arteries of the neck and brain    KB LAINEZ MD         SYSTEM ID:  [id number]   MR Brain w/o Contrast    Narrative    EXAM:  MR BRAIN W/O CONTRAST    HISTORY:  Transient ischemic attack (TIA); Stroke, follow up; Neuro  deficit, acute, stroke suspected. .    TECHNIQUE:  Sagittal T1, axial T2, FLAIR, and diffusion noncontrast  imaging of the whole brain was performed.    COMPARISON:  10/24/2020     FINDINGS:    Prominence of the ventricles and sulci is compatible with moderate,  generalized volume loss. No abnormal extra-axial collection,  hydrocephalus, mass effect or midline shift is seen. The basal  cisterns are preserved.    Infarcts within the left temporal and parietal lobes as well as the  right frontal lobe and right cerebellum are chronic. No diffusion  restriction is present to suggest acute ischemia. The major  intracranial vascular flow voids are preserved.    The T1 marrow signal is unremarkable.       Impression    IMPRESSION: No diffusion restriction to suggest acute ischemia.  Multiple chronic infarcts are unchanged when compared to prior.    DOMINICK MUNOZ MD         SYSTEM ID:  [id number]   Troponin I (now)   Result Value Ref Range    Troponin I 45.3 (H) 0.0 - 34.0 pg/mL       Medications - No data to display    Assessments & Plan (with Medical Decision Making)     I have reviewed the nursing notes.    I have reviewed the findings, diagnosis, plan and need for follow up with the patient.      New Prescriptions    MAGNESIUM OXIDE (MAG-OX) 400 (241.3 MG) MG  TABLET    Take 1 tablet (400 mg) by mouth 2 times daily for 10 days    METOPROLOL SUCCINATE ER (TOPROL-XL) 25 MG 24 HR TABLET    Take 1 tablet (25 mg) by mouth daily       Final diagnoses:   TIA (transient ischemic attack)   Elevated troponin   Essential hypertension   Hypomagnesemia   Afebrile.  Vital signs stable but slight hypertension noted..  Patient was then apparent fall this morning.   noticed patient seeming more confused and having some word finding issues.  He had difficulty getting her back to her feet due to weakness.  EMS was called and brought this patient in for further evaluation.  Initial evaluation shows patient having very deliberate speech, and is very lethargic and at times demonstrating word finding issues.  Her right pupil seems slightly more enlarged than the left but reactive.  Some left hand weakness compared to the right and she is left-hand dominant.  A stroke code called initially a level 2 but the patient spouse said her last known well was at 830 this morning.  Therefore this was increased to a  level 1 stroke code.  IV established and she was given fluids.  EKG shows sinus tachycardia.  Left axis deviation.  Inferior infarct, age undetermined.  Cannot rule out anterior infarct, age undetermined.  This is essentially unchanged from previous EKG on 10/22/2021 however the T wave today in V2 is upright where previously it was inverted.  Initial troponin is elevated at 52.  CBC is unremarkable.  Her BMP shows a glucose of 123 with a GFR 51 this is her normal range.  INR is normal.  UA is unremarkable.  Initial CT and CTA of her head and neck are unremarkable.  Dr. Kemp at the [facility] was consulted and did a full telemedicine neurology evaluation.  At this point her symptoms seem to have resolved.  Her word finding issues are gone even her spouse feels she is much more back to normal.  Most likely a TIA.  He recommended further evaluation with MRIs.  The patient's  90-minute troponin returns slightly increased at 54.6.  Her blood pressure remains slightly elevated and she was given Vasotec IV.  Gradually with time her blood pressure decreased to acceptable values.  MRI of her brain without contrast shows no signs of acute findings this is very reassuring.  Her 3-hour troponin returns decreased at 45.3 which is reassuring as well.  I discussed close follow-up with her primary care provider within a week for further evaluation and a follow-up was arranged.  She continues to be slightly tachycardic despite the fact that her blood pressures come down nicely with the Vasotec will start her on Metroprolol XL 25 mg p.o. daily.  Her magnesium was decreased as well at 1.7 and she was given oral replacement.  We will start her on magnesium oxide daily as well.  Continue to monitor symptoms return if there is any concerns for further evaluation as needed.  11/9/2021   [facility]     Roe Garcia PA-C  11/09/21 [id number]

## 2021-11-09 NOTE — PROGRESS NOTES
1.  Has the patient had a previous reaction to IV contrast? n    2.  Does the patient have kidney disease? n    3.  Is the patient on dialysis? n    If YES to any of these questions, exam will be reviewed with a Radiologist before administering contrast.    IV Contrast- Discharge Instructions After Your CT Scan      The IV contrast you received today will be filtered from your bloodstream by your kidneys during the next 24 hours and pass from the body in urine.  You will not be aware of this process and your urine will not change in color.  To help this process you should drink at least 4 additional glasses of water or juice today.  This reduces stress on your kidneys.    Most contrast reactions are immediate.  Should you develop symptoms of concern after discharge, contact the department at the number below.  After hours you should contact your personal physician.  If you develop breathing distress or wheezing, call 911.

## 2021-11-09 NOTE — CONSULTS
[facility]    Stroke Consult Note    Reason for Consult: Stroke Code     Chief Complaint: Fall and Dizziness      HPI  Princess Leone is a 81 year old female with history of prior strokes. Patient does not remember what happened except that she was going to the bathroom and fell.     , at bedside this morning, tells me that he last saw her at her baseline yesterday at 9 pm. Then at 8 am this morning, he was in his office when he heard a big thud. He went to check on her and found her on the floor of the bathroom. She was awake and able to talk to him. She was confused, did not know how she fell, and she was incontinent of urine. He helped her up. After half an hour she seemed still not herself, tired and a bit confused, so he called the ambulance. He did not notice any obvious weakness of one side of the body but she seemed to be supporting her left arm so he wondered if there was a difference between the two.  says that the patient currently seems to be at baseline except looking a bit too tired.       Imaging Findings  Head CT is done and I personally reviewed. No acute pathology. She does have encephalomalacia of prior stroke on both sides of the brain. I also personally reviewed the CTA which did not show LVO. I discussed imaging findings with neuroradiologist.       Thrombolytic Treatment   Not given due to unclear or unfavorable risk-benefit profile for extended window thrombolysis beyond the conventional 4.5 hour time window.    Endovascular Treatment  Not initiated due to absence of proximal vessel occlusion    Impression   Fall and encephalopathy. Cause unclear.   It is possible that the patient is having a small stroke that led her to fall. Her examination does not show focal deficit compared to baseline. If she is having a stroke, she is not a candidate for IV thrombolysis because of being outside the treatment window. There is no LVO to warrant mechanical  "thrombectomy. Other possible etiologies include fall due to unsteady gait leading to TBI/minor concussion, syncope, seizure.       Recommendations  We will need an MRI to find out if there is an underlying stroke.   She should also be evaluated for other causes of encephalopathy such as seizure, systemic infection, dehydration/electrolyte abnormality, etc.   Continue home .   Avoid hypotension.      Patient Follow-up    - final recommendation pending work-up    Kike Montez MD, Msc, BRENDA, FAAN   of Neurology  [facility]     11/09/2021 12:35 PM  To page me or covering stroke neurology team member, click here: AMCOM  Choose \"On Call\" tab at top, then search dropdown box for \"Neurology Adult\" & press Enter, look for Neuro ICU/Stroke      ______________________________________________________    Clinically Significant Risk Factors Present on Admission             # Platelet Defect: home medication list includes an antiplatelet medication    # CKD, Stage 3 (unspecified if a or b) (GFR 30 - 59): Will monitor and treat as appropriate  - last Cr =  1.03 mg/dL (Ref range: 0.60 - 1.20 mg/dL)  - last GFR = 51 mL/min/1.73m2 (Ref range: >60 mL/min/1.73m2)    Past Medical History   Past Medical History:   Diagnosis Date     Cerebral infarction (H)     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  MRI of brain after head CT showed multiple enhancing lesions, concerning for metastatic illness.  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  Referred to ENT and oncology 5/1/12.     Depression      Hypothyroidism     1993     Other hyperlipidemia      Personal history of other medical treatment (CODE)     G3, P3-0-0-3 status post three NSVDs     Past Surgical History   Past Surgical History:   Procedure Laterality Date     COLONOSCOPY  2005 "    approximately  in the [facility] - was normal.     COLONOSCOPY N/A 2021    F/U N/A tubular adenoma     Medications   Home Meds  Prior to Admission medications    Medication Sig Start Date End Date Taking? Authorizing Provider   ARIPiprazole (ABILIFY) 10 MG tablet Take 0.5 tablets (5 mg) by mouth daily 18  Yes Linda Moreno MD   Aspirin Buf,CaCarb-MgCarb-MgO, (BUFFERED ASPIRIN) 325 MG TABS Take 1 tablet by mouth daily 9/29/15  Yes Reported, Patient   cholecalciferol (D 5000) 5000 UNITS CAPS Take one by mouth daily.  Have lab level of vitamin d rechecked after completion of 90 days. 13  Yes Reported, Patient   dibucaine 1 % OINT rectal ointment Place rectally 3 times daily as needed 21  Yes David Carter MD   levothyroxine (SYNTHROID/LEVOTHROID) 88 MCG tablet Take 1 tablet (88 mcg) by mouth daily 20  Yes Linda Moreno MD   nortriptyline (PAMELOR) 25 MG capsule Take 1 capsule by mouth 2 times daily 16  Yes Reported, Patient   simvastatin (ZOCOR) 10 MG tablet Take 1 tablet by mouth once daily 10/18/21  Yes Linda Moreno MD       Scheduled Meds    iopamidol (ISOVUE-370)  100 mL Intravenous Once       Infusion Meds      PRN Meds      Allergies   No Known Allergies  Family History   Family History   Problem Relation Age of Onset     Heart Disease Mother         Heart Disease,D & C at 91/Heart Disease,pacemaker     Hypertension Mother         Hypertension     Other - See Comments Father          at 76.  He had peripheral vascular disease. Bypass./COPD     Heart Disease Brother         Heart Disease, carotid disease and has gone through stenting     Other - See Comments Brother         tinnitis     Heart Disease Brother         Heart Disease,CABG x 3     Heart Surgery Son         Aortic Valve replacement due to bicuspid aortic valve.     Breast Cancer No family hx of         Cancer-breast     Social History   Social History     Tobacco Use      Smoking status: Never Smoker     Smokeless tobacco: Never Used   Vaping Use     Vaping Use: Never used   Substance Use Topics     Alcohol use: No     Drug use: Never     Comment: Drug use: No       Review of Systems   The 10 point Review of Systems is negative other than noted in the HPI or here.        PHYSICAL EXAMINATION  Temp:  [98.1  F (36.7  C)] 98.1  F (36.7  C)  Pulse:  [] 99  Resp:  [9-21] 15  BP: (147-189)/() 189/105  SpO2:  [95 %-99 %] 99 %     On exam, she is awake and alert. Oriented to self, , being at the hospital. She did not appear sleepy. She was able to follow commands consistently but had difficulty with complex commands and sometimes needed commands repeated. She had reduced fluency but was able to name simple objects. No gaze palsy. Mild left facial droop. Mild dysarthria. Subtle left pronator drift. bilat LE seemed symmetric. No hemihypesthesia. No ataxia or involuntary movements.     NIHSS  Interval baseline (11/09/21 1222)   Interval Comments     1a. Level of Consciousness 0-->Alert, keenly responsive   1b. LOC Questions 1-->Answers one question correctly   1c. LOC Commands 0-->Performs both tasks correctly   2.   Best Gaze 0-->Normal   3.   Visual 0-->No visual loss   4.   Facial Palsy 1-->Minor paralysis (flattened nasolabial fold, asymmetry on smiling)   5a. Motor Arm, Left 1-->Drift, limb holds 90 (or 45) degrees, but drifts down before full 10 seconds, does not hit bed or other support   5b. Motor Arm, Right 0-->No drift, limb holds 90 (or 45) degrees for full 10 secs   6a. Motor Leg, Left 0-->No drift, leg holds 30 degree position for full 5 secs   6b. Motor Leg, right 0-->No drift, leg holds 30 degree position for full 5 secs   7.   Limb Ataxia 0-->Absent   8.   Sensory 0-->Normal, no sensory loss   9.   Best Language 1-->Mild-to-moderate aphasia, some obvious loss of fluency or facility of comprehension, without significant limitation on ideas expressed or form of  expression. Reduction of speech and/or comprehension, however, makes conversation. . . (see row details)   10. Dysarthria 1-->Mild-to-moderate dysarthria, patient slurs at least some words and, at worst, can be understood with some difficulty   11. Extinction and Inattention  0-->No abnormality   Total 5 (11/09/21 1222)         Imaging  I personally reviewed all imaging; relevant findings per HPI.     Lab Results Data   CBC  Recent Labs   Lab 11/09/21  1040   WBC 10.3   RBC 4.80   HGB 14.7   HCT 43.9        Basic Metabolic Panel    Recent Labs   Lab 11/09/21  1040      POTASSIUM 4.0   CHLORIDE 100   CO2 26   BUN 14   CR 1.03   *   RAPHAEL 9.8     Liver Panel  No results for input(s): PROTTOTAL, ALBUMIN, BILITOTAL, ALKPHOS, AST, ALT, BILIDIRECT in the last 168 hours.  INR    Recent Labs   Lab Test 11/09/21  1040 06/08/21  1208   INR 0.99 0.97      Lipid Profile    Recent Labs   Lab Test 10/12/21  1341 07/16/20  1442 03/20/19  0922   CHOL 170 158 164   HDL 53 50 51   LDL 84 82 94   TRIG 165* 128 95     A1C    Recent Labs   Lab Test 10/12/21  1341   A1C 5.6     Troponin I    Recent Labs   Lab 11/09/21  1040   GHTROP 52.0*          Stroke Code Data Data   Stroke Code Data  (for stroke code with tele)  Stroke code activated 11/09/21   1002   First stroke provider response 11/09/21   1005   Video start time 11/09/21   1120   Video end time 11/09/21   1136   Last known normal 11/08/21   2100   Time of discovery  (or onset of symptoms)  11/09/21   0800   Head CT read by Stroke Neuro Dr/Provider 11/09/21   1045   Was stroke code de-escalated? Yes 11/09/21 1222  patient is outside emergent treatment time parameters        Telestroke Service Details  Type of service telemedicine diagnostic assessment of acute neurological changes   Reason telemedicine is appropriate patient requires assessment with a specialist for diagnosis and treatment of neurological symptoms   Mode of transmission secure interactive audio  and video communication per Clement   Originating site (patient location) [facility]    Distant site (provider location) [facility]       Over 50 minutes was spent with this patient during today's visit and greater than >50% of the time was spent counseling and coordinating patient's care.

## 2021-11-11 ENCOUNTER — OFFICE VISIT (OUTPATIENT)
Dept: FAMILY MEDICINE | Facility: OTHER | Age: 81
End: 2021-11-11
Attending: FAMILY MEDICINE
Payer: COMMERCIAL

## 2021-11-11 VITALS
RESPIRATION RATE: 16 BRPM | SYSTOLIC BLOOD PRESSURE: 118 MMHG | HEART RATE: 93 BPM | WEIGHT: 179.8 LBS | OXYGEN SATURATION: 97 % | BODY MASS INDEX: 31.85 KG/M2 | TEMPERATURE: 98 F | DIASTOLIC BLOOD PRESSURE: 62 MMHG

## 2021-11-11 DIAGNOSIS — Z86.73 HISTORY OF CVA (CEREBROVASCULAR ACCIDENT): Primary | ICD-10-CM

## 2021-11-11 DIAGNOSIS — G45.9 TIA (TRANSIENT ISCHEMIC ATTACK): ICD-10-CM

## 2021-11-11 DIAGNOSIS — R79.89 ELEVATED TROPONIN: ICD-10-CM

## 2021-11-11 PROCEDURE — 99214 OFFICE O/P EST MOD 30 MIN: CPT | Performed by: FAMILY MEDICINE

## 2021-11-11 PROCEDURE — G0463 HOSPITAL OUTPT CLINIC VISIT: HCPCS

## 2021-11-11 ASSESSMENT — ENCOUNTER SYMPTOMS
FEVER: 0
NERVOUS/ANXIOUS: 0
CHILLS: 0

## 2021-11-11 ASSESSMENT — PAIN SCALES - GENERAL: PAINLEVEL: NO PAIN (0)

## 2021-11-11 NOTE — NURSING NOTE
"Chief Complaint   Patient presents with     ER F/U     11/9 TIA     Patient is here for ER follow up. Seen there 11/9/21 for TIA.     Initial /62 (BP Location: Right arm, Patient Position: Sitting, Cuff Size: Adult Large)   Pulse 93   Temp 98  F (36.7  C) (Tympanic)   Resp 16   Wt 81.6 kg (179 lb 12.8 oz)   LMP  (LMP Unknown)   SpO2 97%   Breastfeeding No   BMI 31.85 kg/m   Estimated body mass index is 31.85 kg/m  as calculated from the following:    Height as of 11/9/21: 1.6 m (5' 3\").    Weight as of this encounter: 81.6 kg (179 lb 12.8 oz).  Medication Reconciliation: complete    Rosa Rayo MA     FOOD SECURITY SCREENING QUESTIONS  Hunger Vital Signs:  Within the past 12 months we worried whether our food would run out before we got money to buy more. Never  Within the past 12 months the food we bought just didn't last and we didn't have money to get more. Never     Rosa Rayo MA 11/11/2021 1:39 PM    "

## 2021-11-11 NOTE — PROGRESS NOTES
"  SUBJECTIVE:   Nursing Notes:   Rosa Rayo MA  11/11/2021  1:39 PM  Sign at exiting of workspace  Chief Complaint   Patient presents with     ER F/U     11/9 TIA     Patient is here for ER follow up. Seen there 11/9/21 for TIA.     Initial /62 (BP Location: Right arm, Patient Position: Sitting, Cuff Size: Adult Large)   Pulse 93   Temp 98  F (36.7  C) (Tympanic)   Resp 16   Wt 81.6 kg (179 lb 12.8 oz)   LMP  (LMP Unknown)   SpO2 97%   Breastfeeding No   BMI 31.85 kg/m   Estimated body mass index is 31.85 kg/m  as calculated from the following:    Height as of 11/9/21: 1.6 m (5' 3\").    Weight as of this encounter: 81.6 kg (179 lb 12.8 oz).  Medication Reconciliation: complete    Rosa Rayo MA     FOOD SECURITY SCREENING QUESTIONS  Hunger Vital Signs:  Within the past 12 months we worried whether our food would run out before we got money to buy more. Never  Within the past 12 months the food we bought just didn't last and we didn't have money to get more. Never     Rosa Rayo MA 11/11/2021 1:39 PM        Princess Leone is a 81 year old female who presents to clinic today for follow up from an Emergency Department visit on 11/9/2021.  She had been seen after falling in the bathroom at home.  This was unwitnessed.  She did not hit her head.  Her  had a hard time getting her up off of the floor and called 911.  She had some word finding difficulty, but was answering questions appropriately in the Emergency Department.  She seemed confused and was not understanding what was going on.  Her EKG was felt to be stable.  She had a normal Basic Metabolic Profile, INR, aPTT, & CBC.  She had 2 elevated troponins of 54.6 and 52.0.  A third troponin was 45.3.  Magnesium was minimally decreased by 1.7.  Urinalysis negative. Her CT of the brain did not show any acute findings.  CT angio of the head and neck also did not show any hemodynamically significant stenosis or occlusion to account for her " symptoms.  MRI of the brain showed multiple chronic infarcts, but no acute findings.  She denies having had any chest pain or shortness of breath at the time.  No seizure activity was noted.  Since she has been home, she has not had any difficulty speaking or confusion or other weakness.    Recent Labs   Lab Test 10/12/21  1341 07/16/20  1442   CHOL 170 158   HDL 53 50   LDL 84 82   TRIG 165* 128         HPI    I personally reviewed medications/allergies/history listed below:    Patient Active Problem List    Diagnosis Date Noted     Thrombosed hemorrhoids 06/28/2021     Priority: Medium     H/O adenomatous polyp of colon 06/28/2021     Priority: Medium     Sigmoid diverticulosis 06/28/2021     Priority: Medium     Rectal bleeding 06/22/2021     Priority: Medium     Osteopenia, unspecified location 06/24/2019     Priority: Medium     Allergic rhinitis 01/24/2018     Priority: Medium     Retinal vascular occlusion 01/24/2018     Priority: Medium     Overview:   Left eye subtotal central retinal vein occlusion.       Depression 01/24/2018     Priority: Medium     Dysthymia 01/24/2018     Priority: Medium     Hearing loss 01/24/2018     Priority: Medium     Overview:   Mild       Hypothyroidism 01/24/2018     Priority: Medium     Symptomatic menopausal or female climacteric states 01/24/2018     Priority: Medium     Abnormal glucose 01/24/2018     Priority: Medium     Vaginitis, atrophic 01/24/2018     Priority: Medium     CKD (chronic kidney disease) 11/02/2016     Priority: Medium     Vitamin D deficiency 03/19/2015     Priority: Medium     Cerebral lesion 05/17/2012     Priority: Medium     Intermittent atrial fibrillation (H) 05/17/2012     Priority: Medium     Other nonspecific abnormal result of function study of brain and central nervous system 04/24/2012     Priority: Medium     Cerebral artery occlusion with cerebral infarction (H) 04/06/2012     Priority: Medium     Viral upper respiratory tract infection  2012     Priority: Medium     Abnormal electrocardiogram 2011     Priority: Medium     Hyperlipidemia 2010     Priority: Medium     Carpal tunnel syndrome 2003     Priority: Medium     Past Medical History:   Diagnosis Date     Cerebral infarction (H)     12,left parietal CVA with expressive aphasia.  MRI of brain after head CT showed multiple enhancing lesions, concerning for metastatic illness.  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  Referred to ENT and oncology 12.     Depression      Hypothyroidism          Other hyperlipidemia      Personal history of other medical treatment (CODE)     G3, P3-0-0-3 status post three NSVDs      Past Surgical History:   Procedure Laterality Date     COLONOSCOPY      approximately  in the [facility] - was normal.     COLONOSCOPY N/A 2021    F/U N/A tubular adenoma     Family History   Problem Relation Age of Onset     Heart Disease Mother         Heart Disease,D & C at 91/Heart Disease,pacemaker     Hypertension Mother         Hypertension     Other - See Comments Father          at 76.  He had peripheral vascular disease. Bypass./COPD     Heart Disease Brother         Heart Disease, carotid disease and has gone through stenting     Other - See Comments Brother         tinnitis     Heart Disease Brother         Heart Disease,CABG x 3     Heart Surgery Son         Aortic Valve replacement due to bicuspid aortic valve.     Breast Cancer No family hx of         Cancer-breast     Social History     Tobacco Use     Smoking status: Never Smoker     Smokeless tobacco: Never Used   Substance Use Topics     Alcohol use: No     Social History     Social History Narrative    Retired from  teaching    3 grown kids and 5 grandkids    Lives with  in [facility]  1    Calcium good    Water some    She is retired.  She and her  relocated to the [facility] from [place], [place].      Kirk Son, 4/10/64      Sh    tiffanie Daughter, 9/8/67      Karen Daughter, 3/9/69       Brian -      Current Outpatient Medications   Medication Sig Dispense Refill     ARIPiprazole (ABILIFY) 10 MG tablet Take 0.5 tablets (5 mg) by mouth daily 30 tablet 1     Aspirin Buf,CaCarb-MgCarb-MgO, (BUFFERED ASPIRIN) 325 MG TABS Take 1 tablet by mouth daily       cholecalciferol (D 5000) 5000 UNITS CAPS Take one by mouth daily.  Have lab level of vitamin d rechecked after completion of 90 days.       dibucaine 1 % OINT rectal ointment Place rectally 3 times daily as needed 28 g 1     levothyroxine (SYNTHROID/LEVOTHROID) 88 MCG tablet Take 1 tablet (88 mcg) by mouth daily 90 tablet 3     magnesium oxide (MAG-OX) 400 (241.3 Mg) MG tablet Take 1 tablet (400 mg) by mouth 2 times daily for 10 days 20 tablet 0     metoprolol succinate ER (TOPROL-XL) 25 MG 24 hr tablet Take 1 tablet (25 mg) by mouth daily 30 tablet 0     nortriptyline (PAMELOR) 25 MG capsule Take 1 capsule by mouth 2 times daily       simvastatin (ZOCOR) 10 MG tablet Take 1 tablet by mouth once daily 90 tablet 0     No Known Allergies    Review of Systems   Constitutional: Negative for chills and fever.   Cardiovascular: Negative for chest pain and peripheral edema.   Psychiatric/Behavioral: Negative for mood changes. The patient is not nervous/anxious.         OBJECTIVE:     /62 (BP Location: Right arm, Patient Position: Sitting, Cuff Size: Adult Large)   Pulse 93   Temp 98  F (36.7  C) (Tympanic)   Resp 16   Wt 81.6 kg (179 lb 12.8 oz)   LMP  (LMP Unknown)   SpO2 97%   Breastfeeding No   BMI 31.85 kg/m    Body mass index is 31.85 kg/m .  Physical Exam  Constitutional:       Appearance: Normal appearance.   HENT:      Head: Normocephalic.   Eyes:      Extraocular Movements: Extraocular movements intact.       Pupils: Pupils are equal, round, and reactive to light.   Cardiovascular:      Rate and Rhythm: Normal rate and regular rhythm.      Pulses: Normal pulses.      Heart sounds: Normal heart sounds. No murmur heard.      Pulmonary:      Effort: Pulmonary effort is normal.      Breath sounds: Normal breath sounds. No wheezing, rhonchi or rales.   Musculoskeletal:      Cervical back: Normal range of motion and neck supple.      Right lower leg: No edema.      Left lower leg: No edema.   Lymphadenopathy:      Cervical: No cervical adenopathy.   Neurological:      General: No focal deficit present.      Mental Status: She is alert and oriented to person, place, and time. Mental status is at baseline.      Motor: No weakness.      Gait: Gait normal.   Psychiatric:         Mood and Affect: Mood normal.         Behavior: Behavior normal.           PHQ-9 SCORE 6/24/2019 7/16/2020 6/8/2021   PHQ-9 Total Score 0 0 3       PHQ-2 Score:     PHQ-2 ( 1999 [employer]) 11/11/2021 7/12/2021   Q1: Little interest or pleasure in doing things 0 0   Q2: Feeling down, depressed or hopeless 0 0   PHQ-2 Score 0 0       ANGIE-7 SCORE 6/24/2019   Total Score 0         No flowsheet data found.      I personally reviewed results withpatient as listed below:   Diagnostic Test Results:  none     ASSESSMENT/PLAN:       ICD-10-CM    1. History of CVA (cerebrovascular accident)  Z86.73 Echocardiogram Complete     Adult Neurology Referral     CANCELED: Adult Neurology Referral   2. TIA (transient ischemic attack)  G45.9 Echocardiogram Complete     Adult Cardiology Eval Referral     Adult Neurology Referral     CANCELED: Adult Neurology Referral   3. Elevated troponin  R77.8 Echocardiogram Complete     Adult Cardiology Eval Referral       1.  She had imaging as above.  We will also obtain echo and refer to neurology.  2.  Echo as above.  Also referred to cardiology and neurology for further evaluation and treatment recommendations.  She is on a statin, as  well as aspirin 325 mg daily.  3.  She is pain-free.  Denies any chest pain or shortness of breath with exertion.  Echo obtained as above and also referred to cardiology for further work-up recommendations.    Linda Moreno MD  [facility]         Quality 130: Documentation Of Current Medications In The Medical Record: Current Medications not Documented, Patient not Eligible Detail Level: Detailed

## 2021-11-26 ENCOUNTER — MYC MEDICAL ADVICE (OUTPATIENT)
Dept: FAMILY MEDICINE | Facility: OTHER | Age: 81
End: 2021-11-26
Payer: COMMERCIAL

## 2021-11-26 DIAGNOSIS — E03.9 HYPOTHYROIDISM, UNSPECIFIED TYPE: Primary | ICD-10-CM

## 2021-12-16 ENCOUNTER — HOSPITAL ENCOUNTER (OUTPATIENT)
Dept: CARDIOLOGY | Facility: OTHER | Age: 81
Discharge: HOME | End: 2021-12-16
Attending: FAMILY MEDICINE | Admitting: FAMILY MEDICINE
Payer: MEDICARE

## 2021-12-16 DIAGNOSIS — R79.89 ELEVATED TROPONIN: ICD-10-CM

## 2021-12-16 DIAGNOSIS — G45.9 TIA (TRANSIENT ISCHEMIC ATTACK): ICD-10-CM

## 2021-12-16 DIAGNOSIS — Z86.73 HISTORY OF CVA (CEREBROVASCULAR ACCIDENT): ICD-10-CM

## 2021-12-16 LAB — LVEF ECHO: NORMAL

## 2021-12-16 PROCEDURE — 93306 TTE W/DOPPLER COMPLETE: CPT | Mod: 26 | Performed by: INTERNAL MEDICINE

## 2021-12-16 PROCEDURE — 93306 TTE W/DOPPLER COMPLETE: CPT

## 2021-12-20 ENCOUNTER — OFFICE VISIT (OUTPATIENT)
Dept: CARDIOLOGY | Facility: OTHER | Age: 81
End: 2021-12-20
Attending: FAMILY MEDICINE
Payer: COMMERCIAL

## 2021-12-20 VITALS
WEIGHT: 176 LBS | BODY MASS INDEX: 31.18 KG/M2 | HEIGHT: 63 IN | SYSTOLIC BLOOD PRESSURE: 128 MMHG | TEMPERATURE: 97 F | DIASTOLIC BLOOD PRESSURE: 78 MMHG | HEART RATE: 97 BPM | RESPIRATION RATE: 18 BRPM

## 2021-12-20 DIAGNOSIS — G45.9 TIA (TRANSIENT ISCHEMIC ATTACK): Primary | ICD-10-CM

## 2021-12-20 DIAGNOSIS — I47.10 PAROXYSMAL SUPRAVENTRICULAR TACHYCARDIA (H): ICD-10-CM

## 2021-12-20 DIAGNOSIS — R55 NEAR SYNCOPE: ICD-10-CM

## 2021-12-20 DIAGNOSIS — Z86.79 HISTORY OF ATRIAL FIBRILLATION: ICD-10-CM

## 2021-12-20 DIAGNOSIS — R79.89 ELEVATED TROPONIN: ICD-10-CM

## 2021-12-20 DIAGNOSIS — N18.31 STAGE 3A CHRONIC KIDNEY DISEASE (H): ICD-10-CM

## 2021-12-20 DIAGNOSIS — R06.09 DOE (DYSPNEA ON EXERTION): ICD-10-CM

## 2021-12-20 LAB
ANION GAP SERPL CALCULATED.3IONS-SCNC: 7 MMOL/L (ref 3–14)
BUN SERPL-MCNC: 13 MG/DL (ref 7–25)
CALCIUM SERPL-MCNC: 10 MG/DL (ref 8.6–10.3)
CHLORIDE BLD-SCNC: 106 MMOL/L (ref 98–107)
CO2 SERPL-SCNC: 26 MMOL/L (ref 21–31)
CREAT SERPL-MCNC: 1.13 MG/DL (ref 0.6–1.2)
GFR SERPL CREATININE-BSD FRML MDRD: 46 ML/MIN/1.73M2
GLUCOSE BLD-MCNC: 95 MG/DL (ref 70–105)
POTASSIUM BLD-SCNC: 4.4 MMOL/L (ref 3.5–5.1)
SODIUM SERPL-SCNC: 139 MMOL/L (ref 134–144)
TROPONIN I SERPL-MCNC: 4.5 PG/ML (ref 0–34)

## 2021-12-20 PROCEDURE — 80048 BASIC METABOLIC PNL TOTAL CA: CPT | Mod: ZL | Performed by: NURSE PRACTITIONER

## 2021-12-20 PROCEDURE — 99205 OFFICE O/P NEW HI 60 MIN: CPT | Performed by: NURSE PRACTITIONER

## 2021-12-20 PROCEDURE — 84484 ASSAY OF TROPONIN QUANT: CPT | Mod: ZL | Performed by: NURSE PRACTITIONER

## 2021-12-20 PROCEDURE — 93005 ELECTROCARDIOGRAM TRACING: CPT | Performed by: NURSE PRACTITIONER

## 2021-12-20 PROCEDURE — G0463 HOSPITAL OUTPT CLINIC VISIT: HCPCS | Mod: 25

## 2021-12-20 PROCEDURE — 36415 COLL VENOUS BLD VENIPUNCTURE: CPT | Mod: ZL | Performed by: NURSE PRACTITIONER

## 2021-12-20 PROCEDURE — 93010 ELECTROCARDIOGRAM REPORT: CPT | Performed by: INTERNAL MEDICINE

## 2021-12-20 ASSESSMENT — MIFFLIN-ST. JEOR: SCORE: 1238.58

## 2021-12-20 ASSESSMENT — PAIN SCALES - GENERAL: PAINLEVEL: NO PAIN (0)

## 2021-12-20 ASSESSMENT — PATIENT HEALTH QUESTIONNAIRE - PHQ9
10. IF YOU CHECKED OFF ANY PROBLEMS, HOW DIFFICULT HAVE THESE PROBLEMS MADE IT FOR YOU TO DO YOUR WORK, TAKE CARE OF THINGS AT HOME, OR GET ALONG WITH OTHER PEOPLE: NOT DIFFICULT AT ALL
SUM OF ALL RESPONSES TO PHQ QUESTIONS 1-9: 0
SUM OF ALL RESPONSES TO PHQ QUESTIONS 1-9: 0

## 2021-12-20 NOTE — PATIENT INSTRUCTIONS
Pt is an [employer] employee presenting for COVID swab only   You were seen by  ANA PAULA Arrieta CNP     1. Laboratory blood work has been ordered for today.  You will be notified by phone call or [employer] message when the results are available.     2. You will be scheduled for an appointment to have a Zio Patch placed on the skin.  This monitor will be worn for 14 days.  This is a device that will monitor your heart rate and rhythm. The hospital scheduling department will contact you to schedule this appointment, and educate you on the use of this patch.    3. Lexiscan Stress test has been ordered. You will be called to schedule this.  You will receive instructions for testing at that time.  You will be contacted with results.      You will follow up with [facility] in 6 weeks, sooner if needed.     Please call the cardiology office with problems, questions, or concerns at [phone].    If you experience chest pain, chest pressure, chest tightness, shortness of breath, fainting, lightheadedness, nausea, vomiting, or other concerning symptoms, please report to the Emergency Department or call 911. These symptoms may be emergent, and best treated in the Emergency Department.     Cardiology Nurses  WANDA Dean, JACOB Castro LPN  [facility] (Unit 3[facility])  [phone]

## 2021-12-20 NOTE — NURSING NOTE
"Patient comes in for consult for  TIA, elevated troponin.  Joi Loyd LPN ....................12/20/2021   12:58 PM  Chief Complaint   Patient presents with     Consult For     TIA, elevated troponin       Initial /78 (BP Location: Right arm, Patient Position: Sitting, Cuff Size: Adult Large)   Pulse 97   Temp 97  F (36.1  C) (Tympanic)   Resp 18   Ht 1.61 m (5' 3.39\")   Wt 79.8 kg (176 lb)   LMP  (LMP Unknown)   BMI 30.80 kg/m   Estimated body mass index is 30.8 kg/m  as calculated from the following:    Height as of this encounter: 1.61 m (5' 3.39\").    Weight as of this encounter: 79.8 kg (176 lb).  Meds Reconciled: complete  Pt is on Aspirin  Pt is on a Statin  PHQ and/or ANGIE reviewed. Pt referred to PCP/MH Provider as appropriate.    Joi Loyd LPN    FOOD SECURITY SCREENING QUESTIONS  Hunger Vital Signs:  Within the past 12 months we worried whether our food would run out before we got money to buy more. Never  Within the past 12 months the food we bought just didn't last and we didn't have money to get more. Never  Joi Loyd LPN 12/20/2021 12:59 PM    "

## 2021-12-20 NOTE — PROGRESS NOTES
"[facility] HEART CARE   CARDIOLOGY CONSULT     Princess Leone   [address] [place] [address]    Linda Moreno     Chief Complaint   Patient presents with     Consult For     TIA, elevated troponin        HPI:   Ms. Leone is an 81 year old female who presents for cardiology evaluation with TIA and elevated troponin. Patient has a PMH significant for PAF, HLD, retinal vascular occlusion, osteopenia, sigmoid diverticulosis, hypothyroidism, CKD, vitamin D deficiency and dysthymia.    Patient was evaluated in the ED on 11/9/2021 as a result of fall in her bathroom at home which was unwitnessed.  She was transferred to the ED by EMS with difficulty word finding on arrival, laboratory evaluation revealed elevated troponin at 54.6, otherwise labs unremarkable.  Serial troponins were trended while in the ED, value decreased 45.3.  Head CT with no acute intracranial findings.  CT angio of the head and neck did not reveal any hemodynamically significant stenoses or occlusion.  MRI of the brain revealed multiple chronic infarcts with no acute findings.  She did not endorse any anginal symptoms.  No seizure-like activity was witnessed.    She was seen in follow-up by PCP, TTE was ordered including neurology referral with suspected TIA. She was to continue on statin and  mg daily.     TTE completed on 12/16/21 revealing normal biventricular function, LVEF 60-65%. LV diastolic function indeterminate on this study.  Her diastolic Doppler findings did suggest elevated LV filling pressures. No atrial enlargement, atrial septum intact. This was not ordered as bubble study to assess for PFO, no atrial septal defect or atrial septal aneurysm identified.  No hemodynamically significant valvular disease.  No pulmonary hypertension.  No pericardial effusion.    Patient reports diagnosis of atrial fibrillation, \"20 to 30 years ago\".  Symptomatic A. fib at onset.  She has not had any recurrence of AF " identified or symptoms of AF since initial diagnosis many years ago.  Therefore, she has not been on chronic oral anticoagulation.  She does take aspirin 325 mg daily.    Today patient denies any symptoms of precordial chest pain or pressure, no pain in the arm, jaw, neck or back.  No increased dyspnea at rest or with exertion.  No palpitations.  No recurrence of lightheadedness or syncope since recent event.  No edema or weight gain.  No other concerns today.  No neurological deficits following recent event and no recurrence of transient neurological symptoms.    PAST MEDICAL HISTORY:   Past Medical History:   Diagnosis Date     Cerebral infarction (H)     12,left parietal CVA with expressive aphasia.  MRI of brain after head CT showed multiple enhancing lesions, concerning for metastatic illness.  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  Referred to ENT and oncology 12.     Depression      Hypothyroidism     1993     Other hyperlipidemia      Personal history of other medical treatment (CODE)     G3, P3-0-0-3 status post three NSVDs          FAMILY HISTORY:   Family History   Problem Relation Age of Onset     Heart Disease Mother         Heart Disease,D & C at 91/Heart Disease,pacemaker     Hypertension Mother         Hypertension     Other - See Comments Father          at 76.  He had peripheral vascular disease. Bypass./COPD     Heart Disease Brother         Heart Disease, carotid disease and has gone through stenting     Other - See Comments Brother         tinnitis     Heart Disease Brother         Heart Disease,CABG x 3     Heart Surgery Son         Aortic Valve replacement due to bicuspid aortic valve.     Breast Cancer No family hx of         Cancer-breast          PAST SURGICAL HISTORY:   Past Surgical History:   Procedure Laterality Date      COLONOSCOPY  2005    approximately 2005 in the [facility] and the [facility] - was normal.     COLONOSCOPY N/A 06/28/2021    F/U N/A tubular adenoma          SOCIAL HISTORY:   Social History     Socioeconomic History     Marital status:      Spouse name: None     Number of children: None     Years of education: None     Highest education level: None   Occupational History     None   Tobacco Use     Smoking status: Never Smoker     Smokeless tobacco: Never Used   Vaping Use     Vaping Use: Never used   Substance and Sexual Activity     Alcohol use: No     Drug use: Never     Sexual activity: Not Currently     Partners: Male     Comment: Birth control method: Birth control method: Menopausal   Other Topics Concern     Parent/sibling w/ CABG, MI or angioplasty before 65F 55M? 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  She and her  relocated to the [facility] from [place], [place].      Kirk Son, 4/10/64      Tasha Daughter, 9/8/67      Karen Daughter, 3/9/69       Brian -      Social Determinants of Health     Financial Resource Strain: Not on file   Food Insecurity: Not on file   Transportation Needs: Not on file   Physical Activity: Not on file   Stress: Not on file   Social Connections: Not on file   Intimate Partner Violence: Not on file   Housing Stability: Not on file          CURRENT MEDICATIONS:   Prior to Admission medications    Medication Sig Start Date End Date Taking? Authorizing Provider   ARIPiprazole (ABILIFY) 10 MG tablet Take 0.5 tablets (5 mg) by mouth daily 2/26/18   Linda Moreno MD   Aspirin Buf,CaCarb-MgCarb-MgO, (BUFFERED ASPIRIN) 325 MG TABS Take 1 tablet by mouth daily 9/29/15   Reported, Patient   cholecalciferol (D 5000) 5000 UNITS CAPS Take one by mouth daily.  Have lab level of vitamin d rechecked after completion of 90 days. 8/13/13   Reported, Patient   levothyroxine  "(SYNTHROID/LEVOTHROID) 88 MCG tablet Take 1 tablet (88 mcg) by mouth daily 7/16/20   Linda Moreno MD   metoprolol succinate ER (TOPROL-XL) 25 MG 24 hr tablet Take 1 tablet (25 mg) by mouth daily 11/9/21   Roe Garcia PA-C   nortriptyline (PAMELOR) 25 MG capsule Take 1 capsule by mouth 2 times daily 8/20/16   Reported, Patient   simvastatin (ZOCOR) 10 MG tablet Take 1 tablet by mouth once daily 10/18/21   Linda Moreno MD   lisinopril (ZESTRIL) 5 MG tablet Take 1 tablet (5 mg) by mouth daily 11/9/21 11/9/21  Roe Garcia PA-C          ALLERGIES:   No Known Allergies       ROS:   CONSTITUTIONAL: No reported fever or chills. No changes in weight.  ENT: No visual disturbance, ear ache, epistaxis or sore throat.   CARDIOVASCULAR: No chest pain, chest pressure or chest discomfort. No palpitations or lower extremity edema.   RESPIRATORY: No shortness of breath, dyspnea upon exertion, cough, wheezing or hemoptysis.   GI: No reported abdominal pain, melena or hematochezia.  : No reported hematuria or dysuria.   NEUROLOGICAL: Recent episode of lightheadedness and syncope without recurrence.  No ataxia, paresthesias or weakness.   HEMATOLOGIC: No history of anemia. No bleeding or excessive bruising. No history of blood clots.   MUSCULOSKELETAL: No new joint pain or swelling, no muscle pain.  ENDOCRINOLOGIC: No temperature intolerance. No hair or skin changes.  SKIN: No abnormal rashes or sores, no unusual itching.  PSYCHIATRIC: No history of depression or anxiety. No changes in mood, feeling down or anxious. No changes in sleep.      PHYSICAL EXAM:   /78 (BP Location: Right arm, Patient Position: Sitting, Cuff Size: Adult Large)   Pulse 97   Temp 97  F (36.1  C) (Tympanic)   Resp 18   Ht 1.61 m (5' 3.39\")   Wt 79.8 kg (176 lb)   LMP  (LMP Unknown)   BMI 30.80 kg/m    GENERAL: The patient is a well-developed, well-nourished, in no apparent distress.  HEENT: Head is " normocephalic and atraumatic. Eyes are symmetrical with normal visual tracking. No icterus, no xanthelasmas. Nares appeared normal without nasal drainage. Mucous membranes are moist, no cyanosis.  NECK: Supple. No adenopathy, asymmetry or masses. Carotid upstroke are normal bilaterally, no cervical bruits, JVP not visible.   CHEST/ LUNGS: Lungs clear to auscultation, no rales, rhonchi or wheezes, no use of accessory muscles, no retractions, respirations unlabored and normal respiratory rate.   CARDIO: Regular rate and rhythm normal with S1 and S2, no S3 or S4 and no murmur, click or rub. Precordium quiet with normal PMI.    ABD: Abdomen is nondistended.   EXTREMITIES: No lower extremity edema present.  MUSCULOSKELETAL: No visible joint swelling.   NEUROLOGIC: Alert and oriented X3. Normal speech, gait and affect. No focal neurologic deficits.   SKIN: No jaundice. No rashes or visible skin lesions present. No ecchymosis.     EKG:    Sinus rhythm with sinus arrhythmia, rate 87 bpm, occasional PVC, left axis deviation, no ST-T changes.    LAB RESULTS:   [facility]/Nurse Visit on 10/26/2021   Component Date Value Ref Range Status     Ventricular Rate 10/26/2021 102  BPM Final     Atrial Rate 10/26/2021 102  BPM Final     KS Interval 10/26/2021 202  ms Final     QRS Duration 10/26/2021 86  ms Final     QT 10/26/2021 336  ms Final     QTc 10/26/2021 437  ms Final     P Axis 10/26/2021 78  degrees Final     R AXIS 10/26/2021 -53  degrees Final     T Axis 10/26/2021 66  degrees Final     Interpretation ECG 10/26/2021    Final                    Value:Sinus tachycardia  Possible Left atrial enlargement  Left axis deviation  Cannot rule out Anterior infarct (cited on or before 08-JUN-2021)  Abnormal ECG  When compared with ECG of 08-JUN-2021 12:01,  Premature ventricular complexes are no longer Present  Confirmed by MD CLAY DARIN ([id number]) on 10/26/2021 7:57:46 PM     Lab on 10/12/2021   Component Date Value Ref Range  Status     Cholesterol 10/12/2021 170  <200 mg/dL Final     Triglycerides 10/12/2021 165* <150 mg/dL Final     Direct Measure HDL 10/12/2021 53  23 - 92 mg/dL Final     LDL Cholesterol Calculated 10/12/2021 84  <=100 mg/dL Final     Non HDL Cholesterol 10/12/2021 117  <130 mg/dL Final     Patient Fasting > 8hrs? 10/12/2021 Unknown   Final     Sodium 10/12/2021 138  134 - 144 mmol/L Final     Potassium 10/12/2021 3.8  3.5 - 5.1 mmol/L Final     Chloride 10/12/2021 102  98 - 107 mmol/L Final     Carbon Dioxide (CO2) 10/12/2021 32* 21 - 31 mmol/L Final     Anion Gap 10/12/2021 4  3 - 14 mmol/L Final     Urea Nitrogen 10/12/2021 12  7 - 25 mg/dL Final     Creatinine 10/12/2021 1.10  0.60 - 1.20 mg/dL Final     Calcium 10/12/2021 9.5  8.6 - 10.3 mg/dL Final     Glucose 10/12/2021 89  70 - 105 mg/dL Final     Alkaline Phosphatase 10/12/2021 55  34 - 104 U/L Final     AST 10/12/2021 19  13 - 39 U/L Final     ALT 10/12/2021 16  7 - 52 U/L Final     Protein Total 10/12/2021 7.3  6.4 - 8.9 g/dL Final     Albumin 10/12/2021 4.1  3.5 - 5.7 g/dL Final     Bilirubin Total 10/12/2021 0.4  0.3 - 1.0 mg/dL Final     GFR Estimate 10/12/2021 47* >60 mL/min/1.73m2 Final     Hemoglobin A1C 10/12/2021 5.6  4.0 - 6.2 % Final     Nortriptyline Level 10/12/2021 55  50 - 150 ng/mL Final     WBC Count 10/12/2021 9.3  4.0 - 11.0 10e3/uL Final     RBC Count 10/12/2021 4.90  3.80 - 5.20 10e6/uL Final     Hemoglobin 10/12/2021 15.0  11.7 - 15.7 g/dL Final     Hematocrit 10/12/2021 45.4  35.0 - 47.0 % Final     MCV 10/12/2021 93  78 - 100 fL Final     MCH 10/12/2021 30.6  26.5 - 33.0 pg Final     MCHC 10/12/2021 33.0  31.5 - 36.5 g/dL Final     RDW 10/12/2021 12.7  10.0 - 15.0 % Final     Platelet Count 10/12/2021 276  150 - 450 10e3/uL Final     % Neutrophils 10/12/2021 57  % Final     % Lymphocytes 10/12/2021 32  % Final     % Monocytes 10/12/2021 8  % Final     % Eosinophils 10/12/2021 2  % Final     % Basophils 10/12/2021 1  % Final     %  Immature Granulocytes 10/12/2021 0  % Final     NRBCs per 100 WBC 10/12/2021 0  <1 /100 Final     Absolute Neutrophils 10/12/2021 5.3  1.6 - 8.3 10e3/uL Final     Absolute Lymphocytes 10/12/2021 3.0  0.8 - 5.3 10e3/uL Final     Absolute Monocytes 10/12/2021 0.7  0.0 - 1.3 10e3/uL Final     Absolute Eosinophils 10/12/2021 0.2  0.0 - 0.7 10e3/uL Final     Absolute Basophils 10/12/2021 0.1  0.0 - 0.2 10e3/uL Final     Absolute Immature Granulocytes 10/12/2021 0.0  <=0.0 10e3/uL Final     Absolute NRBCs 10/12/2021 0.0  10e3/uL Final          ASSESSMENT:   Princess Leone presents for cardiology evaluation with TIA and elevated troponin. Patient has a PMH significant for PAF, HLD, retinal vascular occlusion, osteopenia, sigmoid diverticulosis, hypothyroidism, CKD, vitamin D deficiency and dysthymia.  Today patient denies any symptoms of precordial chest pain or pressure, no pain in the arm, jaw, neck or back.  No increased dyspnea at rest or with exertion.  No palpitations.  No recurrence of lightheadedness or syncope since recent event.  No edema or weight gain.  No other concerns today.  No neurological deficits following recent event and no recurrence of transient neurological symptoms.    1. TIA (transient ischemic attack)  2. Elevated troponin  3. Stage 3a chronic kidney disease (H)  4. Near syncope  5. Paroxysmal supraventricular tachycardia (H)  6. ALMENDAREZ (dyspnea on exertion)  7. History of atrial fibrillation    PLAN:   1. Patient with recent syncope event, symptoms suggestive of vasovagal in nature. Concern with suspected TIA and remote history of AF without recurrence.   2. Recommended cardiac monitor to assess fore recurrence of AF with recent TIA. If recurrence of AF identified, she would be candidate for oral anticoagulation with a [id number] score of 4.  3. TTE reviewed from 12/16/21 revealing normal biventricular function, LVEF 60-65%. LV diastolic function indeterminate on this study.  Her diastolic Doppler  findings did suggest elevated LV filling pressures. No atrial enlargement, atrial septum intact. No hemodynamically significant valvular disease, no pulmonary hypertension and no pericardial effusion.  4. TTE was not bubble study, there was no evidence of atrial septal defect or atrial septal aneurysm identified to suggest PFO. No indication for bubble study at this point, if PFO where to be identified, given her advanced age and lack of acute infarct on MRI would no be optimal candidate for closure device at this time.  5. Given recent elevated troponin during hospitalization and ALMENDAREZ, recommended stress test via NM Lexiscan myocardial perfusion imaging. Patent is agreeable to this. Troponin level reassessed today and back to normal value.   6. Patient will follow-up with cardiology in 6 weeks to review results of stress test and cardiac monitor.   7. She will remain on  mg daily, Toprol XL 25 mg daily and Simvastatin.       Thank you for allowing me to participate in the care of your patient. Please do not hesitate to contact me if you have any questions.     Total time 70 min on date of encounter spent reviewing records, face-to-face time obtaining HPI, physical exam, reviewing results of recent testing and counseling on the above diagnoses and recommended plan of care.    Karely Lew, APRN CNP CHFN    Answers for HPI/ROS submitted by the patient on 12/20/2021  If you checked off any problems, how difficult have these problems made it for you to do your work, take care of things at home, or get along with other people?: Not difficult at all  PHQ9 TOTAL SCORE: 0

## 2021-12-21 LAB
ATRIAL RATE - MUSE: 87 BPM
DIASTOLIC BLOOD PRESSURE - MUSE: NORMAL MMHG
INTERPRETATION ECG - MUSE: NORMAL
P AXIS - MUSE: 67 DEGREES
PR INTERVAL - MUSE: 180 MS
QRS DURATION - MUSE: 102 MS
QT - MUSE: 360 MS
QTC - MUSE: 433 MS
R AXIS - MUSE: -38 DEGREES
SYSTOLIC BLOOD PRESSURE - MUSE: NORMAL MMHG
T AXIS - MUSE: 48 DEGREES
VENTRICULAR RATE- MUSE: 87 BPM

## 2021-12-21 ASSESSMENT — PATIENT HEALTH QUESTIONNAIRE - PHQ9: SUM OF ALL RESPONSES TO PHQ QUESTIONS 1-9: 0

## 2022-01-04 ENCOUNTER — TELEPHONE (OUTPATIENT)
Dept: CARDIOLOGY | Facility: OTHER | Age: 82
End: 2022-01-04
Payer: COMMERCIAL

## 2022-01-04 NOTE — TELEPHONE ENCOUNTER
Spouse verified .  Called re: order for stress test with f/u with cardiology on 22.  Pt had not returned call re:  Scheduling a stress test.  Instructed on stress test and follow up appointment with cardiology.  Spouse agreed to appointment on 22 at 11:00 for 3 hour lexiscan with instructions given.  Emphasis on NO caffeine for 12 hours to take all medications with water in AM as usual.  Spouse verbalized understanding.

## 2022-01-07 ENCOUNTER — TELEPHONE (OUTPATIENT)
Dept: CARDIOLOGY | Facility: OTHER | Age: 82
End: 2022-01-07
Payer: COMMERCIAL

## 2022-01-07 NOTE — TELEPHONE ENCOUNTER
Per phone message left patient is declining stress test appointment and follow up with cardiology.  Appointments cancelled.

## 2022-01-25 DIAGNOSIS — E03.9 HYPOTHYROIDISM, UNSPECIFIED TYPE: ICD-10-CM

## 2022-01-26 RX ORDER — LEVOTHYROXINE SODIUM 88 UG/1
TABLET ORAL
Qty: 90 TABLET | Refills: 1 | Status: SHIPPED | OUTPATIENT
Start: 2022-01-26 | End: 2022-09-08

## 2022-01-26 NOTE — TELEPHONE ENCOUNTER
"TSH within range 6/8/21. Rx approved per Prescription Refill Protocol requirements. Daly Louie RN on 1/26/2022 at 8:01 AM    Last Prescription Date: 7/16/2020  Last Qty/Refills: 90 / 3  Last Office Visit: 11/11/21 PCP  Future Office Visit: none     Requested Prescriptions   Pending Prescriptions Disp Refills     levothyroxine (SYNTHROID/LEVOTHROID) 88 MCG tablet [Pharmacy Med Name: Levothyroxine Sodium 88 MCG Oral Tablet] 90 tablet 0     Sig: Take 1 tablet by mouth once daily       Thyroid Protocol Failed - 1/25/2022  8:01 AM        Failed - Normal TSH on file in past 12 months     No lab results found.           Passed - Patient is 12 years or older        Passed - Recent (12 mo) or future (30 days) visit within the authorizing provider's specialty     Patient has had an office visit with the authorizing provider or a provider within the authorizing providers department within the previous 12 mos or has a future within next 30 days. See \"Patient Info\" tab in inbasket, or \"Choose Columns\" in Meds & Orders section of the refill encounter.              Passed - Medication is active on med list        Passed - No active pregnancy on record     If patient is pregnant or has had a positive pregnancy test, please check TSH.          Passed - No positive pregnancy test in past 12 months     If patient is pregnant or has had a positive pregnancy test, please check TSH.               "

## 2022-03-02 DIAGNOSIS — E78.5 HYPERLIPIDEMIA, UNSPECIFIED HYPERLIPIDEMIA TYPE: ICD-10-CM

## 2022-03-02 NOTE — TELEPHONE ENCOUNTER
[id number] sent Rx request for the following:      Requested Prescriptions   Pending Prescriptions Disp Refills     simvastatin (ZOCOR) 10 MG tablet [Pharmacy Med Name: Simvastatin 10 MG Oral Tablet] 90 tablet 0     Sig: Take 1 tablet by mouth once daily   Last Prescription Date:   10/18/21  Last Fill Qty/Refills:         90, R-0    Last Office Visit:              11/11/21  Future Office visit:           None    Unable to complete prescription refill per RN Medication Refill Policy. Katie Gore RN .............. 3/2/2022  2:57 PM

## 2022-03-03 RX ORDER — SIMVASTATIN 10 MG
TABLET ORAL
Qty: 90 TABLET | Refills: 1 | Status: SHIPPED | OUTPATIENT
Start: 2022-03-03 | End: 2022-09-19

## 2022-03-17 ENCOUNTER — TRANSFERRED RECORDS (OUTPATIENT)
Dept: HEALTH INFORMATION MANAGEMENT | Facility: OTHER | Age: 82
End: 2022-03-17
Payer: COMMERCIAL

## 2022-04-19 ENCOUNTER — TRANSFERRED RECORDS (OUTPATIENT)
Dept: HEALTH INFORMATION MANAGEMENT | Facility: OTHER | Age: 82
End: 2022-04-19
Payer: COMMERCIAL

## 2022-04-25 ENCOUNTER — TELEPHONE (OUTPATIENT)
Dept: FAMILY MEDICINE | Facility: OTHER | Age: 82
End: 2022-04-25
Payer: COMMERCIAL

## 2022-04-25 NOTE — TELEPHONE ENCOUNTER
Called and verified patient full name and  with . Notified  of below.     Rosa Reese LPN on 2022 at 12:56 PM

## 2022-04-25 NOTE — TELEPHONE ENCOUNTER
She should not take any vitamins, supplements, aspirin, ibuprofen or naproxen for a week prior to surgery.    Linda Moreno MD

## 2022-04-25 NOTE — TELEPHONE ENCOUNTER
Called and verified patient full name and  with . Patient has preop on Thursday but was wondering about meds prior to surgery.     Advised to stop vitamins and supplements 1 weeks prior to sugery (Vit D and MVI) but was unsure about aspirin.       Rosa Reese LPN on 2022 at 11:58 AM

## 2022-04-25 NOTE — TELEPHONE ENCOUNTER
Please call the patients .   He wants to know if she should stop taking any medications before her eye surgery?      Makayla Beltre on 4/25/2022 at 11:48 AM

## 2022-04-28 ENCOUNTER — OFFICE VISIT (OUTPATIENT)
Dept: FAMILY MEDICINE | Facility: OTHER | Age: 82
End: 2022-04-28
Attending: FAMILY MEDICINE
Payer: MEDICARE

## 2022-04-28 VITALS
OXYGEN SATURATION: 98 % | RESPIRATION RATE: 16 BRPM | DIASTOLIC BLOOD PRESSURE: 72 MMHG | TEMPERATURE: 96.8 F | HEART RATE: 99 BPM | BODY MASS INDEX: 31.82 KG/M2 | HEIGHT: 63 IN | SYSTOLIC BLOOD PRESSURE: 126 MMHG | WEIGHT: 179.6 LBS

## 2022-04-28 DIAGNOSIS — G45.9 TIA (TRANSIENT ISCHEMIC ATTACK): ICD-10-CM

## 2022-04-28 DIAGNOSIS — Z86.73 HISTORY OF CVA (CEREBROVASCULAR ACCIDENT): ICD-10-CM

## 2022-04-28 DIAGNOSIS — Z01.818 PREOP GENERAL PHYSICAL EXAM: Primary | ICD-10-CM

## 2022-04-28 DIAGNOSIS — E03.9 HYPOTHYROIDISM, UNSPECIFIED TYPE: ICD-10-CM

## 2022-04-28 LAB
ANION GAP SERPL CALCULATED.3IONS-SCNC: 6 MMOL/L (ref 3–14)
BASOPHILS # BLD AUTO: 0.1 10E3/UL (ref 0–0.2)
BASOPHILS NFR BLD AUTO: 1 %
BUN SERPL-MCNC: 15 MG/DL (ref 7–25)
CALCIUM SERPL-MCNC: 9.8 MG/DL (ref 8.6–10.3)
CHLORIDE BLD-SCNC: 105 MMOL/L (ref 98–107)
CO2 SERPL-SCNC: 28 MMOL/L (ref 21–31)
CREAT SERPL-MCNC: 1.07 MG/DL (ref 0.6–1.2)
EOSINOPHIL # BLD AUTO: 0.2 10E3/UL (ref 0–0.7)
EOSINOPHIL NFR BLD AUTO: 2 %
ERYTHROCYTE [DISTWIDTH] IN BLOOD BY AUTOMATED COUNT: 12.7 % (ref 10–15)
GFR SERPL CREATININE-BSD FRML MDRD: 52 ML/MIN/1.73M2
GLUCOSE BLD-MCNC: 107 MG/DL (ref 70–105)
HCT VFR BLD AUTO: 46.1 % (ref 35–47)
HGB BLD-MCNC: 15 G/DL (ref 11.7–15.7)
IMM GRANULOCYTES # BLD: 0 10E3/UL
IMM GRANULOCYTES NFR BLD: 0 %
LYMPHOCYTES # BLD AUTO: 3.4 10E3/UL (ref 0.8–5.3)
LYMPHOCYTES NFR BLD AUTO: 37 %
MCH RBC QN AUTO: 30 PG (ref 26.5–33)
MCHC RBC AUTO-ENTMCNC: 32.5 G/DL (ref 31.5–36.5)
MCV RBC AUTO: 92 FL (ref 78–100)
MONOCYTES # BLD AUTO: 0.6 10E3/UL (ref 0–1.3)
MONOCYTES NFR BLD AUTO: 7 %
NEUTROPHILS # BLD AUTO: 4.8 10E3/UL (ref 1.6–8.3)
NEUTROPHILS NFR BLD AUTO: 53 %
NRBC # BLD AUTO: 0 10E3/UL
NRBC BLD AUTO-RTO: 0 /100
PLATELET # BLD AUTO: 258 10E3/UL (ref 150–450)
POTASSIUM BLD-SCNC: 3.9 MMOL/L (ref 3.5–5.1)
RBC # BLD AUTO: 5 10E6/UL (ref 3.8–5.2)
SODIUM SERPL-SCNC: 139 MMOL/L (ref 134–144)
T4 FREE SERPL-MCNC: 0.77 NG/DL (ref 0.6–1.6)
TSH SERPL DL<=0.005 MIU/L-ACNC: 11.15 MU/L (ref 0.4–4)
WBC # BLD AUTO: 9.1 10E3/UL (ref 4–11)

## 2022-04-28 PROCEDURE — 36415 COLL VENOUS BLD VENIPUNCTURE: CPT | Mod: ZL | Performed by: FAMILY MEDICINE

## 2022-04-28 PROCEDURE — 84439 ASSAY OF FREE THYROXINE: CPT | Mod: ZL | Performed by: FAMILY MEDICINE

## 2022-04-28 PROCEDURE — 85025 COMPLETE CBC W/AUTO DIFF WBC: CPT | Mod: ZL | Performed by: FAMILY MEDICINE

## 2022-04-28 PROCEDURE — 84443 ASSAY THYROID STIM HORMONE: CPT | Mod: ZL | Performed by: FAMILY MEDICINE

## 2022-04-28 PROCEDURE — G0463 HOSPITAL OUTPT CLINIC VISIT: HCPCS

## 2022-04-28 PROCEDURE — 99213 OFFICE O/P EST LOW 20 MIN: CPT | Performed by: FAMILY MEDICINE

## 2022-04-28 PROCEDURE — 80048 BASIC METABOLIC PNL TOTAL CA: CPT | Mod: ZL | Performed by: FAMILY MEDICINE

## 2022-04-28 PROCEDURE — 93005 ELECTROCARDIOGRAM TRACING: CPT | Performed by: FAMILY MEDICINE

## 2022-04-28 PROCEDURE — 93010 ELECTROCARDIOGRAM REPORT: CPT | Performed by: INTERNAL MEDICINE

## 2022-04-28 ASSESSMENT — PAIN SCALES - GENERAL: PAINLEVEL: NO PAIN (0)

## 2022-04-28 NOTE — PATIENT INSTRUCTIONS
Preparing for Your Surgery  Getting started  A nurse will call you to review your health history and instructions. They will give you an arrival time based on your scheduled surgery time. Please be ready to share:  Your doctor's clinic name and phone number  Your medical, surgical and anesthesia history  A list of allergies and sensitivities  A list of medicines, including herbal treatments and over-the-counter drugs  Whether the patient has a legal guardian (ask how to send us the papers in advance)  Please tell us if you're pregnant--or if there's any chance you might be pregnant. Some surgeries may injure a fetus (unborn baby), so they require a pregnancy test. Surgeries that are safe for a fetus don't always need a test, and you can choose whether to have one.   If you have a child who's having surgery, please ask for a copy of Preparing for Your Child's Surgery.    Preparing for surgery  Within 30 days of surgery: Have a pre-op exam (sometimes called an H&P, or History and Physical). This can be done at a clinic or pre-operative center.  If you're having a , you may not need this exam. Talk to your care team.  At your pre-op exam, talk to your care team about all medicines you take. If you need to stop any medicines before surgery, ask when to start taking them again.  We do this for your safety. Many medicines can make you bleed too much during surgery. Some change how well surgery (anesthesia) drugs work.  Call your insurance company to let them know you're having surgery. (If you don't have insurance, call [phone].)  Call your clinic if there's any change in your health. This includes signs of a cold or flu (sore throat, runny nose, cough, rash, fever). It also includes a scrape or scratch near the surgery site.  If you have questions on the day of surgery, call your hospital or surgery center.  COVID testing  You may need to be tested for COVID-19 before having surgery. If so, we will give  you instructions.  Eating and drinking guidelines  For your safety: Unless your surgeon tells you otherwise, follow the guidelines below.  Eat and drink as usual until 8 hours before surgery. After that, no food or milk.  Drink clear liquids until 2 hours before surgery. These are liquids you can see through, like water, Gatorade and Propel Water. You may also have black coffee and tea (no cream or milk).  Nothing by mouth within 2 hours of surgery. This includes gum, candy and breath mints.  If you drink alcohol: Stop drinking it the night before surgery.  If your care team tells you to take medicine on the morning of surgery, it's okay to take it with a sip of water.  Preventing infection  Shower or bathe the night before and morning of your surgery. Follow the instructions your clinic gave you. (If no instructions, use regular soap.)  Don't shave or clip hair near your surgery site. We'll remove the hair if needed.  Don't smoke or vape the morning of surgery. You may chew nicotine gum up to 2 hours before surgery. A nicotine patch is okay.  Note: Some surgeries require you to completely quit smoking and nicotine. Check with your surgeon.  Your care team will make every effort to keep you safe from infection. We will:  Clean our hands often with soap and water (or an alcohol-based hand rub).  Clean the skin at your surgery site with a special soap that kills germs.  Give you a special gown to keep you warm. (Cold raises the risk of infection.)  Wear special hair covers, masks, gowns and gloves during surgery.  Give antibiotic medicine, if prescribed. Not all surgeries need antibiotics.  What to bring on the day of surgery  Photo ID and insurance card  Copy of your health care directive, if you have one  Glasses and hearing aides (bring cases)  You can't wear contacts during surgery  Inhaler and eye drops, if you use them (tell us about these when you arrive)  CPAP machine or breathing device, if you use them  A  few personal items, if spending the night  If you have . . .  A pacemaker, ICD (cardiac defibrillator) or other implant: Bring the ID card.  An implanted stimulator: Bring the remote control.  A legal guardian: Bring a copy of the certified (court-stamped) guardianship papers.  Please remove any jewelry, including body piercings. Leave jewelry and other valuables at home.  If you're going home the day of surgery  You must have a responsible adult drive you home. They should stay with you overnight as well.  If you don't have someone to stay with you, and you aren't safe to go home alone, we may keep you overnight. Insurance often won't pay for this.  After surgery  If it's hard to control your pain or you need more pain medicine, please call your surgeon's office.  Questions?   If you have any questions for your care team, list them here: _________________________________________________________________________________________________________________________________________________________________________ ____________________________________ ____________________________________ ____________________________________  For informational purposes only. Not to replace the advice of your health care provider. Copyright   2003, 2019 [employer]. All rights reserved. Clinically reviewed by Evelyn Luo MD. [employer] [id number] - REV 07/21.      No vitamins or supplements for at least 7 days prior to surgery.  The morning of surgery, take only metoprolol, Levothyroxine, nortriptyline, Simvastatin, abilify with a small sip of water.

## 2022-04-28 NOTE — NURSING NOTE
Chief Complaint   Patient presents with     Pre-Op Exam         Medication Reconciliation: complete    Rosa Reese, LPN

## 2022-04-29 LAB
ATRIAL RATE - MUSE: 99 BPM
DIASTOLIC BLOOD PRESSURE - MUSE: NORMAL MMHG
INTERPRETATION ECG - MUSE: NORMAL
P AXIS - MUSE: 79 DEGREES
PR INTERVAL - MUSE: 204 MS
QRS DURATION - MUSE: 96 MS
QT - MUSE: 336 MS
QTC - MUSE: 431 MS
R AXIS - MUSE: -51 DEGREES
SYSTOLIC BLOOD PRESSURE - MUSE: NORMAL MMHG
T AXIS - MUSE: 65 DEGREES
VENTRICULAR RATE- MUSE: 99 BPM

## 2022-05-23 ENCOUNTER — LAB (OUTPATIENT)
Dept: LAB | Facility: OTHER | Age: 82
End: 2022-05-23
Attending: FAMILY MEDICINE
Payer: MEDICARE

## 2022-05-23 DIAGNOSIS — E03.9 HYPOTHYROIDISM, UNSPECIFIED TYPE: ICD-10-CM

## 2022-05-23 LAB — TSH SERPL DL<=0.005 MIU/L-ACNC: 2.67 MU/L (ref 0.4–4)

## 2022-05-23 PROCEDURE — 84443 ASSAY THYROID STIM HORMONE: CPT | Mod: ZL

## 2022-05-23 PROCEDURE — 36415 COLL VENOUS BLD VENIPUNCTURE: CPT | Mod: ZL

## 2022-09-04 DIAGNOSIS — E03.9 HYPOTHYROIDISM, UNSPECIFIED TYPE: ICD-10-CM

## 2022-09-07 NOTE — TELEPHONE ENCOUNTER
[id number] of [place] sent Rx request for the following:      Requested Prescriptions   Pending Prescriptions Disp Refills     EUTHYROX 88 MCG tablet [Pharmacy Med Name: Euthyrox 88 MCG Oral Tablet] 90 tablet 0     Sig: Take 1 tablet by mouth once daily             Last Prescription Date:   01/26/2022  Last Fill Qty/Refills:         90, R-1   Last Office Visit:              04/28/2022  Future Office visit:           None      Jim Terry RN  ....................  9/7/2022   2:05 PM

## 2022-09-08 RX ORDER — LEVOTHYROXINE SODIUM 88 UG/1
TABLET ORAL
Qty: 90 TABLET | Refills: 0 | Status: SHIPPED | OUTPATIENT
Start: 2022-09-08 | End: 2022-12-22

## 2022-09-18 DIAGNOSIS — E78.5 HYPERLIPIDEMIA, UNSPECIFIED HYPERLIPIDEMIA TYPE: ICD-10-CM

## 2022-09-19 RX ORDER — SIMVASTATIN 10 MG
TABLET ORAL
Qty: 90 TABLET | Refills: 1 | Status: SHIPPED | OUTPATIENT
Start: 2022-09-19 | End: 2023-04-06

## 2022-09-19 NOTE — TELEPHONE ENCOUNTER
[id number] of [place] sent Rx request for the following:      Requested Prescriptions   Pending Prescriptions Disp Refills     simvastatin (ZOCOR) 10 MG tablet [Pharmacy Med Name: Simvastatin 10 MG Oral Tablet] 90 tablet 0     Sig: Take 1 tablet by mouth once daily   Last Prescription Date:   3/3/22  Last Fill Qty/Refills:         90, R-1    Last Office Visit:              4/28/22   Future Office visit:           None    Katie Gore RN .............. 9/19/2022  4:54 PM

## 2022-10-07 ENCOUNTER — HOSPITAL ENCOUNTER (OUTPATIENT)
Dept: GENERAL RADIOLOGY | Facility: OTHER | Age: 82
Discharge: HOME | End: 2022-10-07
Attending: NURSE PRACTITIONER
Payer: MEDICARE

## 2022-10-07 ENCOUNTER — OFFICE VISIT (OUTPATIENT)
Dept: FAMILY MEDICINE | Facility: OTHER | Age: 82
End: 2022-10-07
Attending: PHYSICIAN ASSISTANT
Payer: MEDICARE

## 2022-10-07 VITALS
SYSTOLIC BLOOD PRESSURE: 130 MMHG | TEMPERATURE: 97.4 F | RESPIRATION RATE: 16 BRPM | HEIGHT: 63 IN | HEART RATE: 118 BPM | DIASTOLIC BLOOD PRESSURE: 74 MMHG | BODY MASS INDEX: 31.73 KG/M2 | OXYGEN SATURATION: 99 % | WEIGHT: 179.1 LBS

## 2022-10-07 DIAGNOSIS — R05.1 ACUTE COUGH: Primary | ICD-10-CM

## 2022-10-07 DIAGNOSIS — J06.9 VIRAL URI WITH COUGH: ICD-10-CM

## 2022-10-07 DIAGNOSIS — R06.02 SHORTNESS OF BREATH: ICD-10-CM

## 2022-10-07 LAB
FLUAV RNA SPEC QL NAA+PROBE: NEGATIVE
FLUBV RNA RESP QL NAA+PROBE: NEGATIVE
RSV RNA SPEC NAA+PROBE: NEGATIVE
SARS-COV-2 RNA RESP QL NAA+PROBE: NEGATIVE

## 2022-10-07 PROCEDURE — G0463 HOSPITAL OUTPT CLINIC VISIT: HCPCS | Mod: 25

## 2022-10-07 PROCEDURE — G0463 HOSPITAL OUTPT CLINIC VISIT: HCPCS

## 2022-10-07 PROCEDURE — C9803 HOPD COVID-19 SPEC COLLECT: HCPCS | Performed by: NURSE PRACTITIONER

## 2022-10-07 PROCEDURE — 87637 SARSCOV2&INF A&B&RSV AMP PRB: CPT | Mod: ZL | Performed by: NURSE PRACTITIONER

## 2022-10-07 PROCEDURE — 71046 X-RAY EXAM CHEST 2 VIEWS: CPT

## 2022-10-07 PROCEDURE — 99213 OFFICE O/P EST LOW 20 MIN: CPT | Performed by: NURSE PRACTITIONER

## 2022-10-07 ASSESSMENT — PAIN SCALES - GENERAL: PAINLEVEL: NO PAIN (0)

## 2022-10-07 ASSESSMENT — PATIENT HEALTH QUESTIONNAIRE - PHQ9: SUM OF ALL RESPONSES TO PHQ QUESTIONS 1-9: 0

## 2022-10-07 NOTE — NURSING NOTE
"Chief Complaint   Patient presents with     URI     Cough and chest congestion         Initial /74   Pulse 118   Temp 97.4  F (36.3  C) (Tympanic)   Resp 16   Ht 1.6 m (5' 3\")   Wt 81.2 kg (179 lb 1.6 oz)   LMP  (LMP Unknown)   SpO2 99%   Breastfeeding No   BMI 31.73 kg/m   Estimated body mass index is 31.73 kg/m  as calculated from the following:    Height as of this encounter: 1.6 m (5' 3\").    Weight as of this encounter: 81.2 kg (179 lb 1.6 oz).         Norma J. Gosselin, JACOB   "

## 2022-10-07 NOTE — PROGRESS NOTES
ASSESSMENT/PLAN:    I have reviewed the nursing notes.  I have reviewed the findings, diagnosis, plan and need for follow up with the patient.    1. Acute cough  2. Shortness of breath  - XR Chest 2 Views  - Symptomatic; Yes; 10/3/2022 Influenza A/B & SARS-CoV2 (COVID-19) Virus PCR Multiplex Nose  Negative COVID, RSV, and influenza.  Chest x-ray today is clear.  Provided reassurance to patient and her  that there is no evidence of pneumonia that would require antibiotics.  I would recommend time and symptomatic treatment for her cough and over-the-counter medications if desired.  Recommend follow-up if worsening condition or no improvement over the next 1 to 2 weeks.    3. Viral URI with cough  -Symptomatic treatment - Encouraged fluids, salt water gargles, honey (only if greater than 1 year in age due to risk of botulism), elevation, humidifier, sinus rinse/netti pot, lozenges, tea, topical vapor rub, popsicles, rest, etc   -May use over-the-counter Tylenol or ibuprofen PRN    Discussed warning signs/symptoms indicative of need to f/u    Follow up if symptoms persist or worsen or concerns    I explained my diagnostic considerations and recommendations to the patient, who voiced understanding and agreement with the treatment plan. All questions were answered. We discussed potential side effects of any prescribed or recommended therapies, as well as expectations for response to treatments.    Rosa Allen NP  10/7/2022  11:27 AM    HPI:  Princess Leone is a 82 year old female who presents to [facility] today for concerns of cough and chest congestion.  Symptoms started about 4 days ago. Mild sore throat. No sinus pain/pressure or ear pain. No fever/chills. No known sick contacts or exposures. The cough is mostly dry. The cough is not worse at night particularly. Non smoker. No hx of pneumonia or any known lung problems. No known exposures to covid. No fatigue. Slight SOB at rest and with activity.  She  is here with her spouse today.    Review of systems otherwise unremarkable.    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  MRI of brain after head CT showed multiple enhancing lesions, concerning for metastatic illness.  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  Referred to ENT and oncology 5/1/12.     Depression      Hypothyroidism     1993     Other hyperlipidemia      Personal history of other medical treatment (CODE)     G3, P3-0-0-3 status post three NSVDs     Past Surgical History:   Procedure Laterality Date     cataract Bilateral     5/2022 - Em[facility]io     COLONOSCOPY  2005    approximately 2005 in the [facility] - was normal.     COLONOSCOPY N/A 06/28/2021    F/U N/A tubular adenoma     Social History     Tobacco Use     Smoking status: Never Smoker     Smokeless tobacco: Never Used   Substance Use Topics     Alcohol use: No     Current Outpatient Medications   Medication Sig Dispense Refill     ARIPiprazole (ABILIFY) 10 MG tablet Take 0.5 tablets (5 mg) by mouth daily 30 tablet 1     Aspirin Buf,CaCarb-MgCarb-MgO, (BUFFERED ASPIRIN) 325 MG TABS Take 1 tablet by mouth daily       cholecalciferol 125 MCG (5000 UT) CAPS Take one by mouth daily.  Have lab level of vitamin d rechecked after completion of 90 days.       EUTHYROX 88 MCG tablet Take 1 tablet by mouth once daily 90 tablet 0     metoprolol succinate ER (TOPROL-XL) 25 MG 24 hr tablet Take 1 tablet (25 mg) by mouth daily 30 tablet 0     nortriptyline (PAMELOR) 25 MG capsule Take 1 capsule by mouth 2 times daily       simvastatin (ZOCOR) 10 MG tablet Take 1 tablet by mouth once daily 90 tablet 1     No Known Allergies  Past medical history, past surgical history, current medications and allergies reviewed and accurate to the  "best of my knowledge.      ROS:  Refer to HPI    /74   Pulse 118   Temp 97.4  F (36.3  C) (Tympanic)   Resp 16   Ht 1.6 m (5' 3\")   Wt 81.2 kg (179 lb 1.6 oz)   LMP  (LMP Unknown)   SpO2 99%   Breastfeeding No   BMI 31.73 kg/m      EXAM:  General Appearance: Well appearing 82 year old female, appropriate appearance for age. No acute distress   Ears: Left TM intact, translucent with bony landmarks appreciated, no erythema, no effusion, no bulging, no purulence.  Right TM intact, translucent with bony landmarks appreciated, no erythema, no effusion, no bulging, no purulence.  Left auditory canal clear.  Right auditory canal clear.  Normal external ears, non tender.  Eyes: conjunctivae normal without erythema or irritation, corneas clear, no drainage or crusting, no eyelid swelling, pupils equal   Oropharynx: moist mucous membranes, posterior pharynx without erythema, tonsils symmetric, no erythema, no exudates or petechiae, no post nasal drip seen, no trismus, voice clear.    Nose:  Bilateral nares: no erythema, no edema, no drainage or congestion   Neck: supple without adenopathy  Respiratory: normal chest wall and respirations.  Normal effort.  Clear to auscultation bilaterally, no wheezing, crackles or rhonchi.  No increased work of breathing.  + nonproductive cough appreciated.  Cardiac: RRR with no murmurs  Psychological: normal affect, alert, oriented, and pleasant.     Results for orders placed or performed in visit on 10/07/22   XR Chest 2 Views     Status: None    Narrative    PROCEDURE:  XR CHEST 2 VIEWS    HISTORY: r/o pneumonia, productive cough and SOB x4 days. Unknown  covid  - has not tested; Acute cough; Shortness of breath    COMPARISON:  None.    FINDINGS: PA and lateral chest radiographs    Cardiomediastinal silhouette is within normal limits. Streaky left  basilar opacities, likely atelectasis. Small left pleural effusion. No  pneumothorax. No subdiaphragmatic free air.      " Impression    IMPRESSION:    No acute cardiopulmonary process.      JAYDA LAGUNA MD         SYSTEM ID:  [id number]   Symptomatic; Yes; 10/3/2022 Influenza A/B & SARS-CoV2 (COVID-19) Virus PCR Multiplex Nose     Status: Normal    Specimen: Nose; Swab   Result Value Ref Range    Influenza A PCR Negative Negative    Influenza B PCR Negative Negative    RSV PCR Negative Negative    SARS CoV2 PCR Negative Negative    Narrative    Testing was performed using the Xpert Xpress CoV2/Flu/RSV Assay on the [employer] GeneXpert Instrument. This test should be ordered for the detection of SARS-CoV-2 and influenza viruses in individuals who meet clinical and/or epidemiological criteria. Test performance is unknown in asymptomatic patients. This test is for in vitro diagnostic use under the FDA EUA for laboratories certified under CLIA to perform high or moderate complexity testing. This test has not been FDA cleared or approved. A negative result does not rule out the presence of PCR inhibitors in the specimen or target RNA in concentration below the limit of detection for the assay. If only one viral target is positive but coinfection with multiple targets is suspected, the sample should be re-tested with another FDA cleared, approved, or authorized test, if coinfection would change clinical management. This test was validated by the [facility]. These laboratories are certified under the Clinical  Laboratory Improvement Amendments of 1988 (CLIA-88) as qualified to perform high complexity laboratory testing.

## 2022-10-07 NOTE — PATIENT INSTRUCTIONS
Clear chest xr today - no suggested pneumonia. This is great news. Oxygen is stable; lungs overall sound quite clear.     Suspect viral illness.     Covid test in process.     If positive; will call and talk about antiviral medicine- paxlovid.     Otherwise, time and symptomatic treatment.     Follow up if worsening or no improvement.     Antibiotics are not indicated for viral illnesses.

## 2022-11-10 ENCOUNTER — ALLIED HEALTH/NURSE VISIT (OUTPATIENT)
Dept: FAMILY MEDICINE | Facility: OTHER | Age: 82
End: 2022-11-10
Attending: FAMILY MEDICINE
Payer: MEDICARE

## 2022-11-10 DIAGNOSIS — Z23 NEED FOR PROPHYLACTIC VACCINATION AND INOCULATION AGAINST INFLUENZA: Primary | ICD-10-CM

## 2022-11-10 PROCEDURE — 90662 IIV NO PRSV INCREASED AG IM: CPT

## 2022-12-20 DIAGNOSIS — E03.9 HYPOTHYROIDISM, UNSPECIFIED TYPE: ICD-10-CM

## 2022-12-21 NOTE — TELEPHONE ENCOUNTER
Routing refill request to provider for review/approval because:    LOV: 4/28/22  Alvina Mcclelland RN on 12/21/2022 at 3:12 PM

## 2022-12-22 RX ORDER — LEVOTHYROXINE SODIUM 88 UG/1
TABLET ORAL
Qty: 90 TABLET | Refills: 0 | Status: SHIPPED | OUTPATIENT
Start: 2022-12-22 | End: 2023-04-17

## 2023-01-28 ENCOUNTER — HEALTH MAINTENANCE LETTER (OUTPATIENT)
Age: 83
End: 2023-01-28

## 2023-04-03 DIAGNOSIS — E78.5 HYPERLIPIDEMIA, UNSPECIFIED HYPERLIPIDEMIA TYPE: ICD-10-CM

## 2023-04-03 NOTE — LETTER
April 5, 2023      Princess Leone  [address] [place] [address]        Dear Princess,     This letter is to remind you that you are due for your annual exam with Linda Moreno. Your last comprehensive visit was more than 12 months ago.      Please call the clinic at [phone] to schedule your appointment.      If you are no longer seeing Linda Moreno for primary care, please call to let us know.       Thank you for choosing [facility] and [facility] for your health care needs.    Sincerely,    Sarah MUÑOZ  [facility]

## 2023-04-05 NOTE — TELEPHONE ENCOUNTER
Routing refill request to provider for review/approval because:    LOV: 4/28/22  Patient will be due for annual review  Letter sent  Will route to outreach to call patient and help assist in scheduling appointment.  Alvina Mcclelland RN on 4/5/2023 at 2:51 PM

## 2023-04-06 ENCOUNTER — TELEPHONE (OUTPATIENT)
Dept: FAMILY MEDICINE | Facility: OTHER | Age: 83
End: 2023-04-06
Payer: COMMERCIAL

## 2023-04-06 ENCOUNTER — PATIENT OUTREACH (OUTPATIENT)
Dept: CARE COORDINATION | Facility: CLINIC | Age: 83
End: 2023-04-06
Payer: COMMERCIAL

## 2023-04-06 DIAGNOSIS — F41.9 ANXIETY: ICD-10-CM

## 2023-04-06 DIAGNOSIS — F32.A DEPRESSION, UNSPECIFIED DEPRESSION TYPE: Primary | ICD-10-CM

## 2023-04-06 RX ORDER — SIMVASTATIN 10 MG
TABLET ORAL
Qty: 90 TABLET | Refills: 0 | Status: SHIPPED | OUTPATIENT
Start: 2023-04-06 | End: 2023-05-16

## 2023-04-06 NOTE — TELEPHONE ENCOUNTER
Yes please.  I have placed a referral for card coordination.  They will be receiving a call to discuss further to see if there are ways we might be able to help her.  Linda Moreno MD

## 2023-04-06 NOTE — TELEPHONE ENCOUNTER
Called patient and spoke to .  states that wife had devolved a fear of coming into the clinic and a fear of needles. She has been refusing to come in for any care and  would like some help trying to convince her she needs to be seen for an annual exam.  Should I get care coordination involved with this?  ..Gunnar Chapa LPN on 4/6/2023 at 9:19 AM

## 2023-04-06 NOTE — PROGRESS NOTES
"Clinic Care Coordination Contact  [facility]       Clinical Data: Care Coordinator Outreach  Outreach attempted x 1.   patient states they are eating now. Would like call back tomorrow afternoon.     Plan: Care Coordinator will attempt again.  Katie Van RN on 4/6/2023 at 3:44 PM       Clinic Care Coordination Contact    Clinic Care Coordination Contact  OUTREACH    Referral Information:  PCP  No chief complaint on file.    Universal Utilization: See below      Utilization    Hospital Admissions  0             ED Visits  0             No Show Count (past year)  0                Current as of: 4/5/2023  1:55 PM            Clinical Concerns:    Patient does not feel she has any concerns. Said to tell PCP that \"we are pretty happy.\"  No physical complaints or worries.      Living Situation:   Lives with  who is \"doing well.\"    Lifestyle & Psychosocial Needs:  Denies any    Social Determinants of Health     Tobacco Use: Low Risk  (10/7/2022)    Patient History      Smoking Tobacco Use: Never      Smokeless Tobacco Use: Never      Passive Exposure: Not on file   Alcohol Use: Not on file   Financial Resource Strain: Not on file   Food Insecurity: Not on file   Transportation Needs: Not on file   Physical Activity: Not on file   Stress: Not on file   Social Connections: Not on file   Intimate Partner Violence: Not on file   Depression: Not at risk (10/7/2022)    PHQ-2      PHQ-2 Score: 0   Housing Stability: Not on file         Resources and Interventions:  Current Resources:  No need for care coordination or other resources. Declined at this time.       drives and is able to manage around home to help as needed.      Plan: Patient is not ready to schedule annual yet. Denied any specific fear of coming into clinic. Reassurance given that she can choose to follow any suggestions or not as she wishes. She agreed to call when more snow has melted. Katie Van RN on 4/7/2023 at 4:13 PM     "

## 2023-04-14 DIAGNOSIS — E03.9 HYPOTHYROIDISM, UNSPECIFIED TYPE: ICD-10-CM

## 2023-04-14 NOTE — TELEPHONE ENCOUNTER
[facility] sent Rx request for the following:    Levothyroxine Sodium 88 MCG Oral Tablet  Last Prescription Date:   12/22/22  Last Fill Qty/Refills:         90, R-0    Last Office Visit:              4/28/22   Future Office visit:           None     Rossi To RN on 4/14/2023 at 3:03 PM

## 2023-04-17 RX ORDER — LEVOTHYROXINE SODIUM 88 UG/1
TABLET ORAL
Qty: 90 TABLET | Refills: 0 | Status: SHIPPED | OUTPATIENT
Start: 2023-04-17 | End: 2023-05-16

## 2023-05-01 ENCOUNTER — PATIENT OUTREACH (OUTPATIENT)
Dept: CARE COORDINATION | Facility: CLINIC | Age: 83
End: 2023-05-01
Payer: COMMERCIAL

## 2023-05-01 NOTE — PROGRESS NOTES
Clinic Care Coordination Contact    Follow Up Progress Note      Assessment: Patient has an annual visit set up with provider in May. Per  she was fearful of coming in. She did not seem to identify any needs for care coordination.     Plan:   Care coordinator will not place on panel as she was resistant to support, and immediate goal to get her scheduled for annual visit was met.   Katie Van RN on 5/1/2023 at 12:52 PM

## 2023-05-12 ASSESSMENT — ACTIVITIES OF DAILY LIVING (ADL)
CURRENT_FUNCTION: SHOPPING REQUIRES ASSISTANCE
CURRENT_FUNCTION: TRANSPORTATION REQUIRES ASSISTANCE
CURRENT_FUNCTION: NO ASSISTANCE NEEDED
CURRENT_FUNCTION: LAUNDRY REQUIRES ASSISTANCE
CURRENT_FUNCTION: PREPARING MEALS REQUIRES ASSISTANCE
CURRENT_FUNCTION: HOUSEWORK REQUIRES ASSISTANCE

## 2023-05-12 ASSESSMENT — ENCOUNTER SYMPTOMS
DIARRHEA: 0
DYSURIA: 0
MYALGIAS: 0
NAUSEA: 0
PALPITATIONS: 0
BREAST MASS: 0
SORE THROAT: 0
CONSTIPATION: 0
HEARTBURN: 0
FEVER: 0
WEAKNESS: 0
PARESTHESIAS: 0
NERVOUS/ANXIOUS: 0
ARTHRALGIAS: 0
HEMATOCHEZIA: 0
CHILLS: 0
FREQUENCY: 0
COUGH: 0
HEMATURIA: 0
EYE PAIN: 0
ABDOMINAL PAIN: 0
SHORTNESS OF BREATH: 0
HEADACHES: 0
JOINT SWELLING: 0
DIZZINESS: 0

## 2023-05-16 ENCOUNTER — OFFICE VISIT (OUTPATIENT)
Dept: FAMILY MEDICINE | Facility: OTHER | Age: 83
End: 2023-05-16
Attending: FAMILY MEDICINE
Payer: COMMERCIAL

## 2023-05-16 VITALS
HEART RATE: 64 BPM | RESPIRATION RATE: 16 BRPM | WEIGHT: 181 LBS | TEMPERATURE: 98.2 F | HEIGHT: 63 IN | BODY MASS INDEX: 32.07 KG/M2 | DIASTOLIC BLOOD PRESSURE: 80 MMHG | SYSTOLIC BLOOD PRESSURE: 136 MMHG

## 2023-05-16 DIAGNOSIS — E03.9 HYPOTHYROIDISM, UNSPECIFIED TYPE: ICD-10-CM

## 2023-05-16 DIAGNOSIS — Z23 NEED FOR DIPHTHERIA-TETANUS-PERTUSSIS (TDAP) VACCINE: ICD-10-CM

## 2023-05-16 DIAGNOSIS — Z13.0 SCREENING FOR DEFICIENCY ANEMIA: ICD-10-CM

## 2023-05-16 DIAGNOSIS — I48.0 INTERMITTENT ATRIAL FIBRILLATION (H): ICD-10-CM

## 2023-05-16 DIAGNOSIS — E78.5 HYPERLIPIDEMIA, UNSPECIFIED HYPERLIPIDEMIA TYPE: ICD-10-CM

## 2023-05-16 DIAGNOSIS — Z00.00 ENCOUNTER FOR MEDICARE ANNUAL WELLNESS EXAM: Primary | ICD-10-CM

## 2023-05-16 DIAGNOSIS — N18.31 STAGE 3A CHRONIC KIDNEY DISEASE (H): ICD-10-CM

## 2023-05-16 DIAGNOSIS — Z23 NEED FOR SHINGLES VACCINE: ICD-10-CM

## 2023-05-16 LAB
ALBUMIN SERPL BCG-MCNC: 4.2 G/DL (ref 3.5–5.2)
ALP SERPL-CCNC: 74 U/L (ref 35–104)
ALT SERPL W P-5'-P-CCNC: 18 U/L (ref 10–35)
ANION GAP SERPL CALCULATED.3IONS-SCNC: 9 MMOL/L (ref 7–15)
AST SERPL W P-5'-P-CCNC: 23 U/L (ref 10–35)
BASOPHILS # BLD AUTO: 0.1 10E3/UL (ref 0–0.2)
BASOPHILS NFR BLD AUTO: 1 %
BILIRUB SERPL-MCNC: 0.4 MG/DL
BUN SERPL-MCNC: 11.3 MG/DL (ref 8–23)
CALCIUM SERPL-MCNC: 9.4 MG/DL (ref 8.8–10.2)
CHLORIDE SERPL-SCNC: 106 MMOL/L (ref 98–107)
CHOLEST SERPL-MCNC: 167 MG/DL
CREAT SERPL-MCNC: 1.1 MG/DL (ref 0.51–0.95)
DEPRECATED HCO3 PLAS-SCNC: 27 MMOL/L (ref 22–29)
EOSINOPHIL # BLD AUTO: 0.2 10E3/UL (ref 0–0.7)
EOSINOPHIL NFR BLD AUTO: 3 %
ERYTHROCYTE [DISTWIDTH] IN BLOOD BY AUTOMATED COUNT: 12.2 % (ref 10–15)
GFR SERPL CREATININE-BSD FRML MDRD: 50 ML/MIN/1.73M2
GLUCOSE SERPL-MCNC: 101 MG/DL (ref 70–99)
HCT VFR BLD AUTO: 45.8 % (ref 35–47)
HDLC SERPL-MCNC: 57 MG/DL
HGB BLD-MCNC: 15 G/DL (ref 11.7–15.7)
HOLD SPECIMEN: NORMAL
IMM GRANULOCYTES # BLD: 0.1 10E3/UL
IMM GRANULOCYTES NFR BLD: 1 %
LDLC SERPL CALC-MCNC: 85 MG/DL
LYMPHOCYTES # BLD AUTO: 3 10E3/UL (ref 0.8–5.3)
LYMPHOCYTES NFR BLD AUTO: 39 %
MCH RBC QN AUTO: 30.7 PG (ref 26.5–33)
MCHC RBC AUTO-ENTMCNC: 32.8 G/DL (ref 31.5–36.5)
MCV RBC AUTO: 94 FL (ref 78–100)
MONOCYTES # BLD AUTO: 0.5 10E3/UL (ref 0–1.3)
MONOCYTES NFR BLD AUTO: 6 %
NEUTROPHILS # BLD AUTO: 3.9 10E3/UL (ref 1.6–8.3)
NEUTROPHILS NFR BLD AUTO: 50 %
NONHDLC SERPL-MCNC: 110 MG/DL
NRBC # BLD AUTO: 0 10E3/UL
NRBC BLD AUTO-RTO: 0 /100
PLATELET # BLD AUTO: 272 10E3/UL (ref 150–450)
POTASSIUM SERPL-SCNC: 4.9 MMOL/L (ref 3.4–5.3)
PROT SERPL-MCNC: 7.5 G/DL (ref 6.4–8.3)
RBC # BLD AUTO: 4.89 10E6/UL (ref 3.8–5.2)
SODIUM SERPL-SCNC: 142 MMOL/L (ref 136–145)
TRIGL SERPL-MCNC: 127 MG/DL
TSH SERPL DL<=0.005 MIU/L-ACNC: 4.52 UIU/ML (ref 0.3–4.2)
WBC # BLD AUTO: 7.7 10E3/UL (ref 4–11)

## 2023-05-16 PROCEDURE — 85014 HEMATOCRIT: CPT | Mod: ZL | Performed by: FAMILY MEDICINE

## 2023-05-16 PROCEDURE — 36415 COLL VENOUS BLD VENIPUNCTURE: CPT | Mod: ZL | Performed by: FAMILY MEDICINE

## 2023-05-16 PROCEDURE — G0439 PPPS, SUBSEQ VISIT: HCPCS | Performed by: FAMILY MEDICINE

## 2023-05-16 PROCEDURE — 80061 LIPID PANEL: CPT | Mod: ZL | Performed by: FAMILY MEDICINE

## 2023-05-16 PROCEDURE — 84443 ASSAY THYROID STIM HORMONE: CPT | Mod: ZL | Performed by: FAMILY MEDICINE

## 2023-05-16 PROCEDURE — 80053 COMPREHEN METABOLIC PANEL: CPT | Mod: ZL | Performed by: FAMILY MEDICINE

## 2023-05-16 RX ORDER — SIMVASTATIN 10 MG
10 TABLET ORAL DAILY
Qty: 90 TABLET | Refills: 3 | Status: SHIPPED | OUTPATIENT
Start: 2023-05-16 | End: 2024-07-06

## 2023-05-16 RX ORDER — LEVOTHYROXINE SODIUM 100 UG/1
100 TABLET ORAL DAILY
Qty: 90 TABLET | Refills: 3 | Status: SHIPPED | OUTPATIENT
Start: 2023-05-16 | End: 2024-08-19

## 2023-05-16 RX ORDER — LEVOTHYROXINE SODIUM 88 UG/1
88 TABLET ORAL DAILY
Qty: 90 TABLET | Refills: 3 | Status: SHIPPED | OUTPATIENT
Start: 2023-05-16 | End: 2023-05-16

## 2023-05-16 RX ORDER — METOPROLOL SUCCINATE 25 MG/1
25 TABLET, EXTENDED RELEASE ORAL DAILY
Qty: 90 TABLET | Refills: 3 | Status: SHIPPED | OUTPATIENT
Start: 2023-05-16 | End: 2024-05-21

## 2023-05-16 ASSESSMENT — ENCOUNTER SYMPTOMS
WEAKNESS: 0
EYE PAIN: 0
DIARRHEA: 0
CONSTIPATION: 0
HEARTBURN: 0
MYALGIAS: 0
CHILLS: 0
FREQUENCY: 0
HEADACHES: 0
PARESTHESIAS: 0
HEMATOCHEZIA: 0
SORE THROAT: 0
SHORTNESS OF BREATH: 0
HEMATURIA: 0
NAUSEA: 0
BREAST MASS: 0
PALPITATIONS: 0
FEVER: 0
COUGH: 0
ABDOMINAL PAIN: 0
JOINT SWELLING: 0
DYSURIA: 0
ARTHRALGIAS: 0
NERVOUS/ANXIOUS: 0
DIZZINESS: 0

## 2023-05-16 ASSESSMENT — ACTIVITIES OF DAILY LIVING (ADL)
CURRENT_FUNCTION: HOUSEWORK REQUIRES ASSISTANCE
CURRENT_FUNCTION: LAUNDRY REQUIRES ASSISTANCE
CURRENT_FUNCTION: TRANSPORTATION REQUIRES ASSISTANCE
CURRENT_FUNCTION: NO ASSISTANCE NEEDED
CURRENT_FUNCTION: SHOPPING REQUIRES ASSISTANCE
CURRENT_FUNCTION: PREPARING MEALS REQUIRES ASSISTANCE

## 2023-05-16 ASSESSMENT — PATIENT HEALTH QUESTIONNAIRE - PHQ9
SUM OF ALL RESPONSES TO PHQ QUESTIONS 1-9: 1
10. IF YOU CHECKED OFF ANY PROBLEMS, HOW DIFFICULT HAVE THESE PROBLEMS MADE IT FOR YOU TO DO YOUR WORK, TAKE CARE OF THINGS AT HOME, OR GET ALONG WITH OTHER PEOPLE: NOT DIFFICULT AT ALL
SUM OF ALL RESPONSES TO PHQ QUESTIONS 1-9: 1

## 2023-05-16 ASSESSMENT — PAIN SCALES - GENERAL: PAINLEVEL: NO PAIN (0)

## 2023-05-16 NOTE — PATIENT INSTRUCTIONS
Patient Education   Personalized Prevention Plan  You are due for the preventive services outlined below.  Your care team is available to assist you in scheduling these services.  If you have already completed any of these items, please share that information with your care team to update in your medical record.  Health Maintenance Due   Topic Date Due     Kidney Microalbumin Urine Test  Never done     Zoster (Shingles) Vaccine (2 of 2) 11/01/2019     Diptheria Tetanus Pertussis (DTAP/TDAP/TD) Vaccine (2 - Tdap) 06/09/2021     Annual Wellness Visit  07/16/2021     COVID-19 Vaccine (4 - Moderna series) 01/03/2022     Cholesterol Lab  10/12/2022     Basic Metabolic Panel  04/28/2023     Thyroid Function Lab  05/23/2023     Your Health Risk Assessment indicates you feel you are not in good health    A healthy lifestyle helps keep the body fit and the mind alert. It helps protect you from disease, helps you fight disease, and helps prevent chronic disease (disease that doesn't go away) from getting worse. This is important as you get older and begin to notice twinges in muscles and joints and a decline in the strength and stamina you once took for granted. A healthy lifestyle includes good healthcare, good nutrition, weight control, recreation, and regular exercise. Avoid harmful substances and do what you can to keep safe. Another part of a healthy lifestyle is stay mentally active and socially involved.    Good healthcare     Have a wellness visit every year.     If you have new symptoms, let us know right away. Don't wait until the next checkup.     Take medicines exactly as prescribed and keep your medicines in a safe place. Tell us if your medicine causes problems.   Healthy diet and weight control     Eat 3 or 4 small, nutritious, low-fat, high-fiber meals a day. Include a variety of fruits, vegetables, and whole-grain foods.     Make sure you get enough calcium in your diet. Calcium, vitamin D, and exercise  help prevent osteoporosis (bone thinning).     If you live alone, try eating with others when you can. That way you get a good meal and have company while you eat it.     Try to keep a healthy weight. If you eat more calories than your body uses for energy, it will be stored as fat and you will gain weight.     Recreation   Recreation is not limited to sports and team events. It includes any activity that provides relaxation, interest, enjoyment, and exercise. Recreation provides an outlet for physical, mental, and social energy. It can give a sense of worth and achievement. It can help you stay healthy.    Mental Exercise and Social Involvement  Mental and emotional health is as important as physical health. Keep in touch with friends and family. Stay as active as possible. Continue to learn and challenge yourself.   Things you can do to stay mentally active are:    Learn something new, like a foreign language or musical instrument.     Play SCRABBLE or do crossword puzzles. If you cannot find people to play these games with you at home, you can play them with others on your computer through the Internet.     Join a games club--anything from card games to chess or checkers or lawn bowling.     Start a new hobby.     Go back to school.     Volunteer.     Read.   Keep up with world events.    Exercise for a Healthier Heart  You may wonder how you can improve the health of your heart. If you re thinking about exercise, you re on the right track. You don t need to become an athlete. But you do need a certain amount of brisk exercise to help strengthen your heart. If you have been diagnosed with a heart condition, your healthcare provider may advise exercise to help your condition. To help make exercise a habit, choose safe, fun activities.      Exercise with a friend. When activity is fun, you're more likely to stick with it.     Before you start  Check with your healthcare provider before starting an exercise program.  This is especially important if you haven't been active for a while. It's also important if you have a long-term (chronic) health problem such as heart disease, diabetes, or obesity. Also check with your provider if you're at high risk for having these problems.   Why exercise?  Exercising regularly offers many healthy rewards. It can help you do all of these:     Improve your blood cholesterol level to help prevent further heart trouble.    Lower your blood pressure to help prevent a stroke or heart attack.    Control diabetes or reduce your risk of getting this disease.    Improve your heart and lung function.    Reach and stay at a healthy weight.    Make your muscles stronger so you can stay active.    Prevent falls and fractures by slowing the loss of bone mass (osteoporosis).    Manage stress better.    Improve your sense of self and your body image.  Exercise tips      Ease into your routine. Set small goals. Then build on them. Talk with your healthcare provider first before starting an exercise routine if you're not sure what your activity level should be.    Exercise on most days. Aim for a total of at least 150 minutes (2 hours and 30 minutes) or more of moderate-intensity aerobic activity each week. You could also do 75 minutes (1 hour and 15 minutes) or more of vigorous-intensity aerobic activity each week. Or try for a combination of both. Moderate activity means that you breathe heavier and your heart rate increases, but you can still talk. Think about doing at least 30 minutes of moderate exercise, 5 times a week. It's OK to work up to the 30-minute period over time. Examples of moderate-intensity activity are brisk walking, gardening, and water aerobics.    Step up your daily activity level.  Along with your exercise program, try being more active the whole day. Walk instead of drive. Or park further away so that you take more steps each day. Do more household tasks or yard work. You may not be able  to meet the advised amount of physical activity. But doing some moderate- or vigorous-intensity aerobic activity can help reduce your risk for heart disease. Your healthcare provider can help you figure out what is best for you.    Choose 1 or more activities you enjoy.  Walking is one of the easiest things you can do. You can also try swimming, riding a bike, dancing, or taking an exercise class.    Call 911  Call 911 right away if any of these occur:     Chest pain that doesn't go away quickly with rest    New burning, tightness, pressure, or heaviness in your chest, neck, shoulders, back, or arms    Abnormal or severe shortness of breath    A very fast or irregular heartbeat (palpitations)    Fainting  When to call your healthcare provider  Call your healthcare provider if you have any of these:     Dizziness or lightheadedness    Mild shortness of breath or chest pain    Increased or new joint or muscle pain    Danika last reviewed this educational content on 7/1/2022 2000-2022 [employer]. All rights reserved. This information is not intended as a substitute for professional medical care. Always follow your healthcare professional's instructions.          Understanding USDA MyPlate  The USDA has guidelines to help you make healthy food choices. These are called MyPlate. MyPlate shows the food groups that make up healthy meals using the image of a place setting. Before you eat, think about the healthiest choices for what to put on your plate or in your cup or bowl. To learn more about building a healthy plate, visit www.choosemyplate.gov.     The food groups    Fruits. Any fruit or 100% fruit juice counts as part of the Fruit Group. Fruits may be fresh, canned, frozen, or dried, and may be whole, cut-up, or pureed. Make 1/2 of your plate fruits and vegetables.    Vegetables. Any vegetable or 100% vegetable juice counts as a member of the Vegetable Group. Vegetables may be fresh, frozen,  canned, or dried. They can be served raw or cooked and may be whole, cut-up, or mashed. Make 1/2 of your plate fruits and vegetables.    Grains. All foods made from grains are part of the Grains Group. These include wheat, rice, oats, cornmeal, and barley. Grains are often used to make foods such as bread, pasta, oatmeal, cereal, tortillas, and grits. Grains should be no more than 1/4 of your plate. At least half of your grains should be whole grains.    Protein. This group includes meat, poultry, seafood, beans and peas, eggs, processed soy products (such as tofu), nuts (including nut butters), and seeds. Make protein choices no more than 1/4 of your plate. Meat and poultry choices should be lean or low fat.    Dairy. The Dairy Group includes all fluid milk products and foods made from milk that contain calcium, such as yogurt and cheese. (Foods that have little calcium, such as cream, butter, and cream cheese, are not part of this group.) Most dairy choices should be low-fat or fat-free.    Oils. Oils aren't a food group, but they do contain essential nutrients. However it's important to watch your intake of oils. These are fats that are liquid at room temperature. They include canola, corn, olive, soybean, vegetable, and sunflower oil. Foods that are mainly oil include mayonnaise, certain salad dressings, and soft margarines. You likely already get your daily oil allowance from the foods you eat.  Things to limit  Eating healthy also means limiting these things in your diet:    Salt (sodium). Many processed foods have a lot of sodium. To keep sodium intake down, eat fresh vegetables, meats, poultry, and seafood when possible. Purchase low-sodium, reduced-sodium, or no-salt-added food products at the store. And don't add salt to your meals at home. Instead, season them with herbs and spices such as dill, oregano, cumin, and paprika. Or try adding flavor with lemon or lime zest and juice.    Saturated fat.  Saturated fats are most often found in animal products such as beef, pork, and chicken. They are often solid at room temperature, such as butter. To reduce your saturated fat intake, choose leaner cuts of meat and poultry. And try healthier cooking methods such as grilling, broiling, roasting, or baking. For a simple lower-fat swap, use plain nonfat yogurt instead of mayonnaise when making potato salad or macaroni salad.    Added sugars. These are sugars added to foods. They are in foods such as ice cream, candy, soda, fruit drinks, sports drinks, energy drinks, cookies, pastries, jams, and syrups. Cut down on added sugars by sharing sweet treats with a family member or friend. You can also choose fruit for dessert, and drink water or other unsweetened beverages.  [employer] last reviewed this educational content on 6/1/2020 2000-2022 [employer]. All rights reserved. This information is not intended as a substitute for professional medical care. Always follow your healthcare professional's instructions.        Activities of Daily Living    Your Health Risk Assessment indicates you have difficulties with activities of daily living such as housework, bathing, preparing meals, taking medication, etc. Please make a follow up appointment for us to address this issue in more detail.    Signs of Hearing Loss  Hearing loss is a problem shared by many people. In fact, it's one of the most common health problems, particularly as people age. Most people aged 65 and older have some hearing loss. By age 80, almost everyone does. Hearing loss often occurs slowly over the years. So, you may not realize your hearing has gotten worse.   When sudden hearing loss occurs, it's important to contact your healthcare provider right away. Your provider will do a medical exam and a hearing exam as soon as possible. This is to help find the cause and type of your sudden hearing loss. Based on your diagnosis, your healthcare  provider will discuss possible treatments.      Hearing much better with one ear can be a sign of hearing loss.     Have your hearing checked  Call your healthcare provider if you:     Have to strain to hear normal conversation    Have to watch other people s faces very carefully to follow what they re saying    Need to ask people to repeat what they ve said    Often misunderstand what people are saying    Turn the volume of the television or radio up so high that others complain    Feel that people are mumbling when they re talking to you    Find that the effort to hear leaves you feeling tired and irritated    Notice, when using the phone, that you hear better with one ear than the other  [employer] last reviewed this educational content on 6/1/2022 2000-2022 [employer]. All rights reserved. This information is not intended as a substitute for professional medical care. Always follow your healthcare professional's instructions.

## 2023-05-16 NOTE — PROGRESS NOTES
"SUBJECTIVE:   Trina is a 83 year old who presents for Preventive Visit.      5/16/2023     2:09 PM   Additional Questions   Roomed by Rosa Reese   Accompanied by self   Patient has been advised of split billing requirements and indicates understanding: Yes  Are you in the first 12 months of your Medicare coverage?  No    Trina is here today for a Medicare wellness visit.  She has been doing well and has no complaints today.  She does need refills of her meds.    Healthy Habits:     In general, how would you rate your overall health?  Fair    Frequency of exercise:  None    Do you usually eat at least 4 servings of fruit and vegetables a day, include whole grains    & fiber and avoid regularly eating high fat or \"junk\" foods?  No    Taking medications regularly:  Yes    Medication side effects:  Not applicable    Ability to successfully perform activities of daily living:  Transportation requires assistance, shopping requires assistance, preparing meals requires assistance, housework requires assistance, laundry requires assistance and no assistance needed    Home Safety:  No safety concerns identified    Hearing Impairment:  Difficulty following a conversation in a noisy restaurant or crowded room and difficulty understanding soft or whispered speech    In the past 6 months, have you been bothered by leaking of urine?  No    In general, how would you rate your overall mental or emotional health?  Good      PHQ-2 Total Score: 1    Additional concerns today:  No      Have you ever done Advance Care Planning? (For example, a Health Directive, POLST, or a discussion with a medical provider or your loved ones about your wishes): Yes, advance care planning is on file.    Fall risk  Fallen 2 or more times in the past year?: No  Any fall with injury in the past year?: No    Cognitive Screening   1) Repeat 3 items (Leader, Season, Table)    2) Clock draw: ABNORMAL needed several prompts for number and incorrect time   3) 3 " item recall: Recalls 1 object   Results: ABNORMAL clock, 1-2 items recalled: PROBABLE COGNITIVE IMPAIRMENT, **INFORM PROVIDER**    Mini-CogTM Copyright NISHI Carlton. Licensed by the author for use in [facility]; reprinted with permission ([email or web address]@[email or web address]). All rights reserved.      Do you have sleep apnea, excessive snoring or daytime drowsiness?: no    Reviewed and updated as needed this visit by clinical staff   Tobacco  Allergies  Meds  Problems             Reviewed and updated as needed this visit by Provider    Allergies  Meds  Problems            Social History     Tobacco Use     Smoking status: Never     Smokeless tobacco: Never   Vaping Use     Vaping status: Never Used     Passive vaping exposure: Yes   Substance Use Topics     Alcohol use: No             5/12/2023     1:02 PM   Alcohol Use   Prescreen: >3 drinks/day or >7 drinks/week? Not Applicable     Do you have a current opioid prescription? No  Do you use any other controlled substances or medications that are not prescribed by a provider? None      Current providers sharing in care for this patient include:   Patient Care Team:  Linda Moreno MD as PCP - General (Family Practice)  Linda Moreno MD as Assigned PCP  Karely Lew APRN CNP as Assigned Heart and Vascular Provider    The following health maintenance items are reviewed in Epic and correct as of today:  Health Maintenance   Topic Date Due     MICROALBUMIN  Never done     ZOSTER IMMUNIZATION (2 of 2) 11/01/2019     DTAP/TDAP/TD IMMUNIZATION (2 - Tdap) 06/09/2021     COVID-19 Vaccine (4 - Moderna series) 01/03/2022     PHQ-9  11/16/2023     MEDICARE ANNUAL WELLNESS VISIT  05/16/2024     BMP  05/16/2024     LIPID  05/16/2024     TSH W/FREE T4 REFLEX  05/16/2024     FALL RISK ASSESSMENT  05/16/2024     ADVANCE CARE PLANNING  05/16/2028     DEXA  03/20/2034     DEPRESSION ACTION PLAN  Completed     INFLUENZA VACCINE  Completed     Pneumococcal Vaccine:  65+ Years  Completed     URINALYSIS  Completed     IPV IMMUNIZATION  Aged Out     MENINGITIS IMMUNIZATION  Aged Out     Labs reviewed in EPIC  BP Readings from Last 3 Encounters:   23 136/80   10/07/22 130/74   22 126/72    Wt Readings from Last 3 Encounters:   23 82.1 kg (181 lb)   10/07/22 81.2 kg (179 lb 1.6 oz)   22 81.5 kg (179 lb 9.6 oz)                  Patient Active Problem List   Diagnosis     Abnormal electrocardiogram     Allergic rhinitis     Carpal tunnel syndrome     Retinal vascular occlusion     Cerebral lesion     CKD (chronic kidney disease)     Cerebral artery occlusion with cerebral infarction (H)     Depression     Dysthymia     Hearing loss     Hyperlipidemia     Hypothyroidism     Intermittent atrial fibrillation (H)     Symptomatic menopausal or female climacteric states     Other nonspecific abnormal result of function study of brain and central nervous system     Abnormal glucose     Vaginitis, atrophic     Viral upper respiratory tract infection     Vitamin D deficiency     Osteopenia, unspecified location     Rectal bleeding     Thrombosed hemorrhoids     H/O adenomatous polyp of colon     Sigmoid diverticulosis     Stage 3a chronic kidney disease (H)     Past Surgical History:   Procedure Laterality Date     cataract Bilateral     2022 - [facility]     COLONOSCOPY      approximately  in [facility] - was normal.     COLONOSCOPY N/A 2021    F/U N/A tubular adenoma       Social History     Tobacco Use     Smoking status: Never     Smokeless tobacco: Never   Vaping Use     Vaping status: Never Used     Passive vaping exposure: Yes   Substance Use Topics     Alcohol use: No     Family History   Problem Relation Age of Onset     Heart Disease Mother         Heart Disease,D & C at 91/Heart Disease,pacemaker     Hypertension Mother         Hypertension     Other - See Comments Father          at 76.  He had peripheral vascular disease. Bypass./COPD      Heart Disease Brother         Heart Disease, carotid disease and has gone through stenting     Other - See Comments Brother         tinnitis     Heart Disease Brother         Heart Disease,CABG x 3     Heart Surgery Son         Aortic Valve replacement due to bicuspid aortic valve.     Breast Cancer No family hx of         Cancer-breast         Current Outpatient Medications   Medication Sig Dispense Refill     ARIPiprazole (ABILIFY) 10 MG tablet Take 0.5 tablets (5 mg) by mouth daily 30 tablet 1     Aspirin Buf,CaCarb-MgCarb-MgO, (BUFFERED ASPIRIN) 325 MG TABS Take 1 tablet by mouth daily       cholecalciferol 125 MCG (5000 UT) CAPS Take one by mouth daily.  Have lab level of vitamin d rechecked after completion of 90 days.       levothyroxine (SYNTHROID/LEVOTHROID) 88 MCG tablet Take 1 tablet (88 mcg) by mouth daily 90 tablet 3     metoprolol succinate ER (TOPROL XL) 25 MG 24 hr tablet Take 1 tablet (25 mg) by mouth daily 90 tablet 3     nortriptyline (PAMELOR) 25 MG capsule Take 1 capsule by mouth 2 times daily       simvastatin (ZOCOR) 10 MG tablet Take 1 tablet (10 mg) by mouth daily 90 tablet 3     Tdap, tetanus-diptheria-acell pertussis, (BOOSTRIX) 5-2.5-18.5 LF-MCG/0.5 CHITO injection Inject 0.5 mLs into the muscle once for 1 dose 0.5 mL 0     zoster vaccine recombinant adjuvanted (SHINGRIX) injection Inject 0.5 mLs into the muscle once for 1 dose Pharmacist administered 0.5 mL 0     No Known Allergies      Mammogram Screening - Patient over age 75, has elected to discontinue screenings.  Pertinent mammograms are reviewed under the imaging tab.    Review of Systems   Constitutional: Negative for chills and fever.   HENT: Negative for congestion, ear pain, hearing loss and sore throat.    Eyes: Negative for pain and visual disturbance.   Respiratory: Negative for cough and shortness of breath.    Cardiovascular: Negative for chest pain, palpitations and peripheral edema.   Gastrointestinal: Negative for  "abdominal pain, constipation, diarrhea, heartburn, hematochezia and nausea.   Breasts:  Negative for tenderness, breast mass and discharge.   Genitourinary: Negative for dysuria, frequency, genital sores, hematuria, pelvic pain, urgency, vaginal bleeding and vaginal discharge.   Musculoskeletal: Negative for arthralgias, joint swelling and myalgias.   Skin: Negative for rash.   Neurological: Negative for dizziness, weakness, headaches and paresthesias.   Psychiatric/Behavioral: Negative for mood changes. The patient is not nervous/anxious.        OBJECTIVE:   /80   Pulse 64   Temp 98.2  F (36.8  C) (Tympanic)   Resp 16   Ht 1.6 m (5' 3\")   Wt 82.1 kg (181 lb)   LMP  (LMP Unknown)   Breastfeeding No   BMI 32.06 kg/m   Estimated body mass index is 32.06 kg/m  as calculated from the following:    Height as of this encounter: 1.6 m (5' 3\").    Weight as of this encounter: 82.1 kg (181 lb).  Physical Exam  Constitutional:       Appearance: She is well-developed.   HENT:      Head: Normocephalic.      Right Ear: External ear normal.      Left Ear: External ear normal.      Nose: Nose normal.   Eyes:      Pupils: Pupils are equal, round, and reactive to light.   Neck:      Thyroid: No thyromegaly.   Cardiovascular:      Rate and Rhythm: Normal rate and regular rhythm.      Heart sounds: Normal heart sounds. No murmur heard.  Pulmonary:      Effort: Pulmonary effort is normal. No respiratory distress.      Breath sounds: Normal breath sounds. No wheezing or rales.      Comments: Declines breast exam today.  Chest:      Chest wall: No tenderness.   Abdominal:      General: Bowel sounds are normal. There is no distension.      Palpations: Abdomen is soft. There is no mass.      Tenderness: There is no abdominal tenderness. There is no guarding or rebound.   Musculoskeletal:         General: No tenderness or deformity.      Cervical back: Normal range of motion and neck supple.   Lymphadenopathy:      Cervical: " No cervical adenopathy.   Skin:     General: Skin is warm and dry.   Neurological:      Mental Status: She is alert and oriented to person, place, and time.      Cranial Nerves: No cranial nerve deficit.      Coordination: Coordination normal.   Psychiatric:         Mood and Affect: Mood normal.         Behavior: Behavior normal.           Diagnostic Test Results:  Labs reviewed in Epic    ASSESSMENT / PLAN:       ICD-10-CM    1. Encounter for Medicare annual wellness exam  Z00.00       2. Need for shingles vaccine  Z23 zoster vaccine recombinant adjuvanted (SHINGRIX) injection      3. Need for diphtheria-tetanus-pertussis (Tdap) vaccine  Z23 Tdap, tetanus-diptheria-acell pertussis, (BOOSTRIX) 5-2.5-18.5 LF-MCG/0.5 CHITO injection      4. Screening for deficiency anemia  Z13.0 CBC and Differential     CBC and Differential      5. Stage 3a chronic kidney disease (H)  N18.31 Albumin Random Urine Quantitative with Creat Ratio     Comprehensive metabolic panel     CBC and Differential     Comprehensive metabolic panel     CBC and Differential      6. Hyperlipidemia, unspecified hyperlipidemia type  E78.5 Lipid Profile     Comprehensive metabolic panel     simvastatin (ZOCOR) 10 MG tablet     Lipid Profile     Comprehensive metabolic panel      7. Hypothyroidism, unspecified type  E03.9 Thyrotropin GH     levothyroxine (SYNTHROID/LEVOTHROID) 88 MCG tablet     Thyrotropin GH      8. Intermittent atrial fibrillation (H)  I48.0 metoprolol succinate ER (TOPROL XL) 25 MG 24 hr tablet      1.  Mammogram declined.  This was last completed 2/17/2021 and was normal at that time.  She declines DEXA as well.  Colonoscopy deferred due to age.  This was last completed around 2005 and was normal at that time.  Pap also deferred due to age.  AAA screening last completed 3/20/2019 and was normal.  COVID booster declined.  Flu shot is up-to-date.  She is due for Shingrix No. 2 as well as a Tdap and orders for these were sent to her  "pharmacy.  Prevnar and Pneumovax are up-to-date.  2.  See #1.  3.  See #1.  4.  CBC ordered as above.  5.  Labs ordered as above.  6.  Labs ordered as above.  Simvastatin refilled as well.  7.  TSH ordered as above.  Levothyroxine refilled.  8.  Metoprolol refilled.  Blood pressure is stable today as above.  Anticoagulation was discussed and this was to be based off of a Zio patch, but she did not follow through on this.  She is on a full-strength 325 mg aspirin daily.  She does follow with cardiology.      Patient has been advised of split billing requirements and indicates understanding: Yes      COUNSELING:  Reviewed preventive health counseling, as reflected in patient instructions       Regular exercise       Healthy diet/nutrition       Vision screening       Fall risk prevention       Aspirin prophylaxis        Osteoporosis prevention/bone health       Colon cancer screening      BMI:   Estimated body mass index is 32.06 kg/m  as calculated from the following:    Height as of this encounter: 1.6 m (5' 3\").    Weight as of this encounter: 82.1 kg (181 lb).   Weight management plan: Discussed healthy diet and exercise guidelines      She reports that she has never smoked. She has never used smokeless tobacco.      Appropriate preventive services were discussed with this patient, including applicable screening as appropriate for cardiovascular disease, diabetes, osteopenia/osteoporosis, and glaucoma.  As appropriate for age/gender, discussed screening for colorectal cancer, prostate cancer, breast cancer, and cervical cancer. Checklist reviewing preventive services available has been given to the patient.    Reviewed patients plan of care and provided an AVS. The Basic Care Plan (routine screening as documented in Health Maintenance) for Princess meets the Care Plan requirement. This Care Plan has been established and reviewed with the Patient.      Linda Moreno MD  [facility]    Identified Health Risks:    I have reviewed Opioid Use Disorder and Substance Use Disorder risk factors and made any needed referrals.     Answers for HPI/ROS submitted by the patient on 5/16/2023  If you checked off any problems, how difficult have these problems made it for you to do your work, take care of things at home, or get along with other people?: Not difficult at all  PHQ9 TOTAL SCORE: 1

## 2023-05-16 NOTE — PROGRESS NOTES
"    The patient was provided with suggestions to help her develop a healthy physical lifestyle.  She is at risk for lack of exercise and has been provided with information to increase physical activity for the benefit of her well-being.  The patient was counseled and encouraged to consider modifying their diet and eating habits. She was provided with information on recommended healthy diet options.  The patient reports that she has difficulty with activities of daily living.The patient was provided with written information regarding signs of hearing loss.  Answers for HPI/ROS submitted by the patient on 5/16/2023  If you checked off any problems, how difficult have these problems made it for you to do your work, take care of things at home, or get along with other people?: Not difficult at all  PHQ9 TOTAL SCORE: 1  In general, how would you rate your overall physical health?: fair  Frequency of exercise:: None  Do you usually eat at least 4 servings of fruit and vegetables a day, include whole grains & fiber, and avoid regularly eating high fat or \"junk\" foods? : No  Taking medications regularly:: Yes  Medication side effects:: Not applicable  Activities of Daily Living: transportation requires assistance, shopping requires assistance, preparing meals requires assistance, housework requires assistance, laundry requires assistance, no assistance needed  Home safety: no safety concerns identified  Hearing Impairment:: difficulty following a conversation in a noisy restaurant or crowded room, difficulty understanding soft or whispered speech  In the past 6 months, have you been bothered by leaking of urine?: No  abdominal pain: No  Blood in stool: No  Blood in urine: No  chest pain: No  chills: No  congestion: No  constipation: No  cough: No  diarrhea: No  dizziness: No  ear pain: No  eye pain: No  nervous/anxious: No  fever: No  frequency: No  genital sores: No  headaches: No  hearing loss: No  heartburn: " No  arthralgias: No  joint swelling: No  peripheral edema: No  mood changes: No  myalgias: No  nausea: No  dysuria: No  palpitations: No  Skin sensation changes: No  sore throat: No  urgency: No  rash: No  shortness of breath: No  visual disturbance: No  weakness: No  pelvic pain: No  vaginal bleeding: No  vaginal discharge: No  tenderness: No  breast mass: No  breast discharge: No  In general, how would you rate your overall mental or emotional health?: good  Additional concerns today:: No

## 2023-05-16 NOTE — LETTER
Princess [place]  [address] [place] [address]    5/16/2023      Dear Ms. Leone,      I wanted to let you know about your recent labs.  Your TSH is slightly elevated.  You should have already received a phone call about this, but I would recommend a slight increase in your dose of Levothyroxine to 100 mcg daily.  I have sent a prescription for this to your pharmacy and you should have her TSH repeated in 4-6 weeks.  An order is in your chart for this.  You may call [phone] to make a lab appointment.      You have a slightly elevated creatinine and decreased GFR, which indicates chronic kidney disease.  This is stable.      Your glucose was 101.  Since you were not fasting, this would be considered normal.  All of your other labs were in good range.    Please contact us at [phone] with any questions or concerns that you have.    I have attached your lab results for your records.        Sincerely,         Linda Moreno MD     Resulted Orders   Lipid Profile   Result Value Ref Range    Cholesterol 167 <200 mg/dL    Triglycerides 127 <150 mg/dL    Direct Measure HDL 57 >=50 mg/dL    LDL Cholesterol Calculated 85 <=100 mg/dL    Non HDL Cholesterol 110 <130 mg/dL    Narrative    Cholesterol  Desirable:  <200 mg/dL    Triglycerides  Normal:  Less than 150 mg/dL  Borderline High:  150-199 mg/dL  High:  200-499 mg/dL  Very High:  Greater than or equal to 500 mg/dL    Direct Measure HDL  Female:  Greater than or equal to 50 mg/dL   Male:  Greater than or equal to 40 mg/dL    LDL Cholesterol  Desirable:  <100mg/dL  Above Desirable:  100-129 mg/dL   Borderline High:  130-159 mg/dL   High:  160-189 mg/dL   Very High:  >= 190 mg/dL    Non HDL Cholesterol  Desirable:  130 mg/dL  Above Desirable:  130-159 mg/dL  Borderline High:  160-189 mg/dL  High:  190-219 mg/dL  Very High:  Greater than or equal to 220 mg/dL   Thyrotropin GH   Result Value Ref Range    TSH 4.52 (H) 0.30 - 4.20 uIU/mL    Comprehensive metabolic panel   Result Value Ref Range    Sodium 142 136 - 145 mmol/L    Potassium 4.9 3.4 - 5.3 mmol/L    Chloride 106 98 - 107 mmol/L    Carbon Dioxide (CO2) 27 22 - 29 mmol/L    Anion Gap 9 7 - 15 mmol/L    Urea Nitrogen 11.3 8.0 - 23.0 mg/dL    Creatinine 1.10 (H) 0.51 - 0.95 mg/dL    Calcium 9.4 8.8 - 10.2 mg/dL    Glucose 101 (H) 70 - 99 mg/dL    Alkaline Phosphatase 74 35 - 104 U/L    AST 23 10 - 35 U/L    ALT 18 10 - 35 U/L    Protein Total 7.5 6.4 - 8.3 g/dL    Albumin 4.2 3.5 - 5.2 g/dL    Bilirubin Total 0.4 <=1.2 mg/dL    GFR Estimate 50 (L) >60 mL/min/1.73m2      Comment:      eGFR calculated using 2021 CKD-EPI equation.   CBC with platelets and differential   Result Value Ref Range    WBC Count 7.7 4.0 - 11.0 10e3/uL    RBC Count 4.89 3.80 - 5.20 10e6/uL    Hemoglobin 15.0 11.7 - 15.7 g/dL    Hematocrit 45.8 35.0 - 47.0 %    MCV 94 78 - 100 fL    MCH 30.7 26.5 - 33.0 pg    MCHC 32.8 31.5 - 36.5 g/dL    RDW 12.2 10.0 - 15.0 %    Platelet Count 272 150 - 450 10e3/uL    % Neutrophils 50 %    % Lymphocytes 39 %    % Monocytes 6 %    % Eosinophils 3 %    % Basophils 1 %    % Immature Granulocytes 1 %    NRBCs per 100 WBC 0 <1 /100    Absolute Neutrophils 3.9 1.6 - 8.3 10e3/uL    Absolute Lymphocytes 3.0 0.8 - 5.3 10e3/uL    Absolute Monocytes 0.5 0.0 - 1.3 10e3/uL    Absolute Eosinophils 0.2 0.0 - 0.7 10e3/uL    Absolute Basophils 0.1 0.0 - 0.2 10e3/uL    Absolute Immature Granulocytes 0.1 <=0.4 10e3/uL    Absolute NRBCs 0.0 10e3/uL

## 2023-05-16 NOTE — NURSING NOTE
Chief Complaint   Patient presents with     Wellness Visit       Medication Reconciliation: complete    Rosa Reese, LPN     Mycophenolic Acid Levels Monitoring Note    D:  Free mycophenolic acid levels were drawn around 0600 dose of MMF on 12/6/2017. The MMF is being used as prophylaxis for prevention of GVHD.   Free levels are drawn to calculate an area under the curve (AUC) which is used to determine if dosing is adequate to balance prevention of GVHD versus myelosuppression.   Dose: 210 mg IV Q8H (drug at steady state)              Infusion time:  9991-3822   First (pre-dose trough) was not collected.  Back extrapolating final trough or lowest number gives a calculated AUC: 236-278 ng*hr/ml    A:  The AUC is within the desired AUC range of 200-250 ng*hr/ml for an 8 hour dosing  interval.  Patient is day +14; no neutrophil engraftment. Matched sib HSCT.     P:  Discussed with BMT Attending Jong De Dios MD; will decrease by 25%.  Will not order more levels unless it would be clinically necessary.  Pharmacy will continue to follow.    Ani Archer PharmD

## 2023-05-18 ENCOUNTER — LAB (OUTPATIENT)
Dept: LAB | Facility: OTHER | Age: 83
End: 2023-05-18
Attending: FAMILY MEDICINE
Payer: MEDICARE

## 2023-05-18 DIAGNOSIS — N18.31 STAGE 3A CHRONIC KIDNEY DISEASE (H): ICD-10-CM

## 2023-05-18 LAB
CREAT UR-MCNC: 88.6 MG/DL
MICROALBUMIN UR-MCNC: <12 MG/L
MICROALBUMIN/CREAT UR: NORMAL MG/G{CREAT}

## 2023-05-18 PROCEDURE — 82570 ASSAY OF URINE CREATININE: CPT | Mod: ZL

## 2023-06-21 ENCOUNTER — LAB (OUTPATIENT)
Dept: LAB | Facility: OTHER | Age: 83
End: 2023-06-21
Attending: FAMILY MEDICINE
Payer: MEDICARE

## 2023-06-21 DIAGNOSIS — E03.9 HYPOTHYROIDISM, UNSPECIFIED TYPE: ICD-10-CM

## 2023-06-21 LAB — TSH SERPL DL<=0.005 MIU/L-ACNC: 3.19 UIU/ML (ref 0.3–4.2)

## 2023-06-21 PROCEDURE — 84443 ASSAY THYROID STIM HORMONE: CPT | Mod: ZL

## 2023-06-21 PROCEDURE — 36415 COLL VENOUS BLD VENIPUNCTURE: CPT | Mod: ZL

## 2023-10-26 ENCOUNTER — TRANSFERRED RECORDS (OUTPATIENT)
Dept: HEALTH INFORMATION MANAGEMENT | Facility: OTHER | Age: 83
End: 2023-10-26
Payer: COMMERCIAL

## 2024-05-09 ENCOUNTER — DOCUMENTATION ONLY (OUTPATIENT)
Dept: OTHER | Facility: CLINIC | Age: 84
End: 2024-05-09
Payer: COMMERCIAL

## 2024-05-14 DIAGNOSIS — I48.0 INTERMITTENT ATRIAL FIBRILLATION (H): ICD-10-CM

## 2024-05-21 RX ORDER — METOPROLOL SUCCINATE 25 MG/1
25 TABLET, EXTENDED RELEASE ORAL DAILY
Qty: 90 TABLET | Refills: 0 | Status: SHIPPED | OUTPATIENT
Start: 2024-05-21 | End: 2024-08-19

## 2024-05-21 NOTE — TELEPHONE ENCOUNTER
[facility] sent Rx request for the following:      Requested Prescriptions   Pending Prescriptions Disp Refills    metoprolol succinate ER (TOPROL XL) 25 MG 24 hr tablet [Pharmacy Med Name: Metoprolol Succinate ER 25 MG Oral Tablet Extended Release 24 Hour] 90 tablet 0     Sig: Take 1 tablet by mouth once daily       Beta-Blockers Protocol Failed - 5/21/2024  8:41 AM     Last Prescription Date:   5/16/23  Last Fill Qty/Refills:         90, R-3    Last Office Visit:              5/16/23   Future Office visit:            none     Due for annual exam, please call to schedule patient.  Rossi To RN on 5/21/2024 at 8:42 AM

## 2024-06-21 DIAGNOSIS — Z79.899 NEED FOR PROPHYLACTIC CHEMOTHERAPY: Primary | ICD-10-CM

## 2024-06-29 DIAGNOSIS — E78.5 HYPERLIPIDEMIA, UNSPECIFIED HYPERLIPIDEMIA TYPE: ICD-10-CM

## 2024-07-05 NOTE — TELEPHONE ENCOUNTER
[facility] sent Rx request for the following:      Requested Prescriptions   Pending Prescriptions Disp Refills    simvastatin (ZOCOR) 10 MG tablet [Pharmacy Med Name: Simvastatin 10 MG Oral Tablet] 90 tablet 0     Sig: Take 1 tablet by mouth once daily       Antihyperlipidemic agents Failed - 6/29/2024  9:02 PM        Failed - LDL on file in the past 12 months        Failed - Recent (12 mo) or future (90 days) visit within the authorizing provider's specialty     The patient must have completed an in-person or virtual visit within the past 12 months or has a future visit scheduled within the next 90 days with the authorizing provider s specialty.  Urgent care and e-visits do not quality as an office visit for this protocol.          Passed - Medication is active on med list        Passed - Patient is age 18 years or older        Passed - No active pregnancy on record        Passed - No positive pregnancy test in past 12 mos             Last Prescription Date:   5/16/23  Last Fill Qty/Refills:         90, R-3    Last Office Visit:              5/16/23   Future Office visit:             Next 5 appointments (look out 90 days)      Aug 19, 2024 2:20 PM  (Arrive by 2:05 PM)  Annual Wellness Visit with Linda Moreno MD  [facility] ([facility] ) [address]  [place] [id number] [phone]        Unable to complete prescription refill per RN Medication Refill Policy  Jackie Taylor RN on 7/5/2024 at 11:21 AM

## 2024-07-06 RX ORDER — SIMVASTATIN 10 MG
10 TABLET ORAL DAILY
Qty: 90 TABLET | Refills: 0 | Status: SHIPPED | OUTPATIENT
Start: 2024-07-06 | End: 2024-08-19

## 2024-07-13 ENCOUNTER — HEALTH MAINTENANCE LETTER (OUTPATIENT)
Age: 84
End: 2024-07-13

## 2024-08-13 DIAGNOSIS — I48.0 INTERMITTENT ATRIAL FIBRILLATION (H): ICD-10-CM

## 2024-08-15 SDOH — HEALTH STABILITY: PHYSICAL HEALTH: ON AVERAGE, HOW MANY MINUTES DO YOU ENGAGE IN EXERCISE AT THIS LEVEL?: 0 MIN

## 2024-08-15 SDOH — HEALTH STABILITY: PHYSICAL HEALTH: ON AVERAGE, HOW MANY DAYS PER WEEK DO YOU ENGAGE IN MODERATE TO STRENUOUS EXERCISE (LIKE A BRISK WALK)?: 0 DAYS

## 2024-08-15 ASSESSMENT — SOCIAL DETERMINANTS OF HEALTH (SDOH): HOW OFTEN DO YOU GET TOGETHER WITH FRIENDS OR RELATIVES?: ONCE A WEEK

## 2024-08-15 NOTE — TELEPHONE ENCOUNTER
[facility] sent Rx request for the following:      Requested Prescriptions   Pending Prescriptions Disp Refills    metoprolol succinate ER (TOPROL XL) 25 MG 24 hr tablet [Pharmacy Med Name: Metoprolol Succinate ER 25 MG Oral Tablet Extended Release 24 Hour] 90 tablet 0     Sig: Take 1 tablet by mouth once daily       Beta-Blockers Protocol Failed - 8/15/2024  1:37 PM     Last Prescription Date:   5/21/24  Last Fill Qty/Refills:         90, R-0    Last Office Visit:              5/16/23   Future Office visit:             Next 5 appointments (look out 90 days)      Aug 19, 2024 2:20 PM  (Arrive by 2:05 PM)  Annual Wellness Visit with Linda Moreno MD  [facility] ([facility] ) [address]  [place] [id number] [phone]        No answer, unable to leave message.  Patient has appointment next week.  Rossi To RN on 8/15/2024 at 1:38 PM

## 2024-08-16 ENCOUNTER — MYC MEDICAL ADVICE (OUTPATIENT)
Dept: FAMILY MEDICINE | Facility: OTHER | Age: 84
End: 2024-08-16
Payer: COMMERCIAL

## 2024-08-19 ENCOUNTER — OFFICE VISIT (OUTPATIENT)
Dept: FAMILY MEDICINE | Facility: OTHER | Age: 84
End: 2024-08-19
Attending: FAMILY MEDICINE
Payer: COMMERCIAL

## 2024-08-19 VITALS
RESPIRATION RATE: 16 BRPM | WEIGHT: 185 LBS | HEART RATE: 74 BPM | DIASTOLIC BLOOD PRESSURE: 76 MMHG | SYSTOLIC BLOOD PRESSURE: 132 MMHG | BODY MASS INDEX: 32.77 KG/M2 | OXYGEN SATURATION: 97 % | TEMPERATURE: 98.6 F

## 2024-08-19 DIAGNOSIS — N18.31 STAGE 3A CHRONIC KIDNEY DISEASE (H): ICD-10-CM

## 2024-08-19 DIAGNOSIS — Z29.11 NEED FOR VACCINATION AGAINST RESPIRATORY SYNCYTIAL VIRUS: ICD-10-CM

## 2024-08-19 DIAGNOSIS — I48.0 INTERMITTENT ATRIAL FIBRILLATION (H): ICD-10-CM

## 2024-08-19 DIAGNOSIS — Z00.00 ENCOUNTER FOR MEDICARE ANNUAL WELLNESS EXAM: Primary | ICD-10-CM

## 2024-08-19 DIAGNOSIS — Z23 NEED FOR TDAP VACCINATION: ICD-10-CM

## 2024-08-19 DIAGNOSIS — E03.9 HYPOTHYROIDISM, UNSPECIFIED TYPE: ICD-10-CM

## 2024-08-19 DIAGNOSIS — E78.5 HYPERLIPIDEMIA, UNSPECIFIED HYPERLIPIDEMIA TYPE: ICD-10-CM

## 2024-08-19 DIAGNOSIS — Z23 NEED FOR SHINGLES VACCINE: ICD-10-CM

## 2024-08-19 LAB
ALBUMIN SERPL BCG-MCNC: 4.2 G/DL (ref 3.5–5.2)
ALP SERPL-CCNC: 84 U/L (ref 40–150)
ALT SERPL W P-5'-P-CCNC: 16 U/L (ref 0–50)
ANION GAP SERPL CALCULATED.3IONS-SCNC: 8 MMOL/L (ref 7–15)
AST SERPL W P-5'-P-CCNC: 23 U/L (ref 0–45)
BASOPHILS # BLD AUTO: 0.1 10E3/UL (ref 0–0.2)
BASOPHILS NFR BLD AUTO: 1 %
BILIRUB SERPL-MCNC: 0.3 MG/DL
BUN SERPL-MCNC: 16.6 MG/DL (ref 8–23)
CALCIUM SERPL-MCNC: 9.6 MG/DL (ref 8.8–10.4)
CHLORIDE SERPL-SCNC: 104 MMOL/L (ref 98–107)
CHOLEST SERPL-MCNC: 167 MG/DL
CREAT SERPL-MCNC: 1.26 MG/DL (ref 0.51–0.95)
CREAT UR-MCNC: 113.9 MG/DL
EGFRCR SERPLBLD CKD-EPI 2021: 42 ML/MIN/1.73M2
EOSINOPHIL # BLD AUTO: 0.3 10E3/UL (ref 0–0.7)
EOSINOPHIL NFR BLD AUTO: 3 %
ERYTHROCYTE [DISTWIDTH] IN BLOOD BY AUTOMATED COUNT: 14.6 % (ref 10–15)
FASTING STATUS PATIENT QL REPORTED: YES
FASTING STATUS PATIENT QL REPORTED: YES
GLUCOSE SERPL-MCNC: 118 MG/DL (ref 70–99)
HCO3 SERPL-SCNC: 28 MMOL/L (ref 22–29)
HCT VFR BLD AUTO: 42.9 % (ref 35–47)
HDLC SERPL-MCNC: 52 MG/DL
HGB BLD-MCNC: 13.1 G/DL (ref 11.7–15.7)
IMM GRANULOCYTES # BLD: 0 10E3/UL
IMM GRANULOCYTES NFR BLD: 0 %
LDLC SERPL CALC-MCNC: 69 MG/DL
LYMPHOCYTES # BLD AUTO: 3.8 10E3/UL (ref 0.8–5.3)
LYMPHOCYTES NFR BLD AUTO: 41 %
MCH RBC QN AUTO: 26.4 PG (ref 26.5–33)
MCHC RBC AUTO-ENTMCNC: 30.5 G/DL (ref 31.5–36.5)
MCV RBC AUTO: 87 FL (ref 78–100)
MICROALBUMIN UR-MCNC: <12 MG/L
MICROALBUMIN/CREAT UR: NORMAL MG/G{CREAT}
MONOCYTES # BLD AUTO: 0.8 10E3/UL (ref 0–1.3)
MONOCYTES NFR BLD AUTO: 9 %
NEUTROPHILS # BLD AUTO: 4.3 10E3/UL (ref 1.6–8.3)
NEUTROPHILS NFR BLD AUTO: 46 %
NONHDLC SERPL-MCNC: 115 MG/DL
NRBC # BLD AUTO: 0 10E3/UL
NRBC BLD AUTO-RTO: 0 /100
PLATELET # BLD AUTO: 244 10E3/UL (ref 150–450)
POTASSIUM SERPL-SCNC: 4.3 MMOL/L (ref 3.4–5.3)
PROT SERPL-MCNC: 7.9 G/DL (ref 6.4–8.3)
RBC # BLD AUTO: 4.96 10E6/UL (ref 3.8–5.2)
SODIUM SERPL-SCNC: 140 MMOL/L (ref 135–145)
T4 FREE SERPL-MCNC: 1.07 NG/DL (ref 0.9–1.7)
TRIGL SERPL-MCNC: 228 MG/DL
TSH SERPL DL<=0.005 MIU/L-ACNC: 6.6 UIU/ML (ref 0.3–4.2)
WBC # BLD AUTO: 9.3 10E3/UL (ref 4–11)

## 2024-08-19 PROCEDURE — G0463 HOSPITAL OUTPT CLINIC VISIT: HCPCS

## 2024-08-19 PROCEDURE — 99213 OFFICE O/P EST LOW 20 MIN: CPT | Mod: 25 | Performed by: FAMILY MEDICINE

## 2024-08-19 PROCEDURE — 80061 LIPID PANEL: CPT | Mod: ZL | Performed by: FAMILY MEDICINE

## 2024-08-19 PROCEDURE — G0439 PPPS, SUBSEQ VISIT: HCPCS | Performed by: FAMILY MEDICINE

## 2024-08-19 PROCEDURE — 82043 UR ALBUMIN QUANTITATIVE: CPT | Mod: ZL | Performed by: FAMILY MEDICINE

## 2024-08-19 PROCEDURE — 85041 AUTOMATED RBC COUNT: CPT | Mod: ZL | Performed by: FAMILY MEDICINE

## 2024-08-19 PROCEDURE — 82247 BILIRUBIN TOTAL: CPT | Mod: ZL | Performed by: FAMILY MEDICINE

## 2024-08-19 PROCEDURE — 84439 ASSAY OF FREE THYROXINE: CPT | Mod: ZL | Performed by: FAMILY MEDICINE

## 2024-08-19 PROCEDURE — 84443 ASSAY THYROID STIM HORMONE: CPT | Mod: ZL | Performed by: FAMILY MEDICINE

## 2024-08-19 PROCEDURE — 36415 COLL VENOUS BLD VENIPUNCTURE: CPT | Mod: ZL | Performed by: FAMILY MEDICINE

## 2024-08-19 RX ORDER — SIMVASTATIN 10 MG
10 TABLET ORAL DAILY
Qty: 90 TABLET | Refills: 3 | Status: SHIPPED | OUTPATIENT
Start: 2024-08-19

## 2024-08-19 RX ORDER — METOPROLOL SUCCINATE 25 MG/1
25 TABLET, EXTENDED RELEASE ORAL DAILY
Qty: 90 TABLET | Refills: 3 | Status: SHIPPED | OUTPATIENT
Start: 2024-08-19

## 2024-08-19 RX ORDER — LEVOTHYROXINE SODIUM 100 UG/1
100 TABLET ORAL DAILY
Qty: 90 TABLET | Refills: 3 | Status: SHIPPED | OUTPATIENT
Start: 2024-08-19 | End: 2024-09-16

## 2024-08-19 RX ORDER — METOPROLOL SUCCINATE 25 MG/1
25 TABLET, EXTENDED RELEASE ORAL DAILY
Qty: 90 TABLET | Refills: 0 | OUTPATIENT
Start: 2024-08-19

## 2024-08-19 ASSESSMENT — PATIENT HEALTH QUESTIONNAIRE - PHQ9
SUM OF ALL RESPONSES TO PHQ QUESTIONS 1-9: 0
SUM OF ALL RESPONSES TO PHQ QUESTIONS 1-9: 0
10. IF YOU CHECKED OFF ANY PROBLEMS, HOW DIFFICULT HAVE THESE PROBLEMS MADE IT FOR YOU TO DO YOUR WORK, TAKE CARE OF THINGS AT HOME, OR GET ALONG WITH OTHER PEOPLE: NOT DIFFICULT AT ALL

## 2024-08-19 ASSESSMENT — PAIN SCALES - GENERAL: PAINLEVEL: NO PAIN (0)

## 2024-08-19 NOTE — PROGRESS NOTES
Preventive Care Visit  [facility] AND [facility]  Linda Moreno MD, Family Medicine  Aug 19, 2024          Harrison Mena is a 84 year old, presenting for the following:  Wellness Visit        8/19/2024     2:14 PM   Additional Questions   Roomed by Rosa Reese         Health Care Directive  Patient has a Health Care Directive on file  Advance care planning document is on file and is current.    HPI  Here for Medicare Wellness Visit.  She has been doing generally well.  She has had a general cognitive decline over the past few years.  She lives with her  Brian.  He states that he does all of the cooking, bill paying, driving, etc.  He states that he doesn't have any concerns about her wandering and feels that she is still safe living at home with him.        Annual Wellness Visit     Patient has been advised of split billing requirements and indicates understanding: Yes       Health Care Directive  Patient has a Health Care Directive on file  Advance care planning document is on file and is current.      8/15/2024   General Health   How would you rate your overall physical health? (!) FAIR   Feel stress (tense, anxious, or unable to sleep) Not at all             8/15/2024   Nutrition   Diet: Regular (no restrictions)            8/15/2024   Exercise   Days per week of moderate/strenous exercise 0 days   Average minutes spent exercising at this level 0 min        (!) EXERCISE CONCERN      8/15/2024   Social Factors   Frequency of gathering with friends or relatives Once a week   Worry food won't last until get money to buy more No   Food not last or not have enough money for food? No   Do you have housing? (Housing is defined as stable permanent housing and does not include staying ouside in a car, in a tent, in an abandoned building, in an overnight shelter, or couch-surfing.) Yes   Are you worried about losing your housing? No   Lack of transportation? No   Unable to get utilities  (heat,electricity)? No              8/19/2024   Fall Risk   Gait Speed Test (Document in seconds) 8   Gait Speed Test Interpretation Greater than 5.01 seconds - ABNORMAL             8/15/2024   Activities of Daily Living- Home Safety   Needs help with the following daily activites Transportation   Safety concerns in the home None of the above            8/15/2024   Dental   Dentist two times every year? Yes            8/15/2024   Hearing Screening   Hearing concerns? (!) IT'S HARD TO FOLLOW A CONVERSATION IN A NOISY RESTAURANT OR CROWDED ROOM.    (!) TROUBLE UNDESTANDING A SPEAKER IN A PUBLIC MEETING OR [employer] SERVICE.    (!) TROUBLE UNDERSTANDING SOFT OR WHISPERED SPEECH.       Multiple values from one day are sorted in reverse-chronological order         8/15/2024   Driving Risk Screening   Patient/family members have concerns about driving (!) DECLINE            8/15/2024   General Alertness/Fatigue Screening   Have you been more tired than usual lately? No            8/15/2024   Urinary Incontinence Screening   Bothered by leaking urine in past 6 months Yes            8/15/2024   TB Screening   Were you born outside of the [place]? No          Social History     Tobacco Use    Smoking status: Never    Smokeless tobacco: Never   Vaping Use    Vaping status: Never Used   Substance Use Topics    Alcohol use: No    Drug use: Never       Today's PHQ-9 Score:       8/19/2024     2:04 PM   PHQ-9 SCORE   PHQ-9 Total Score MyChart 0   PHQ-9 Total Score 0        Mammogram Screening - After age 74- determine frequency with patient based on health status, life expectancy and patient goals                Reviewed and updated as needed this visit by Provider                    Past Medical History:   Diagnosis Date    Cerebral infarction (H)     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  MRI of brain after head CT showed multiple enhancing lesions, concerning for metastatic illness.  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  Referred to ENT and oncology 5/1/12.    Depression     Hypothyroidism     1993    Other hyperlipidemia     Personal history of other medical treatment (CODE)     G3, P3-0-0-3 status post three NSVDs     Past Surgical History:   Procedure Laterality Date    cataract Bilateral     5/2022 - Emigdio    COLONOSCOPY  2005    approximately 2005 in [facility] - was normal.    COLONOSCOPY N/A 06/28/2021    F/U N/A tubular adenoma     BP Readings from Last 3 Encounters:   08/19/24 132/76   05/16/23 136/80   10/07/22 130/74    Wt Readings from Last 3 Encounters:   08/19/24 83.9 kg (185 lb)   05/16/23 82.1 kg (181 lb)   10/07/22 81.2 kg (179 lb 1.6 oz)                  Patient Active Problem List   Diagnosis    Abnormal electrocardiogram    Allergic rhinitis    Carpal tunnel syndrome    Retinal vascular occlusion    Cerebral lesion    CKD (chronic kidney disease)    Cerebral artery occlusion with cerebral infarction (H)    Depression    Dysthymia    Hearing loss    Hyperlipidemia    Hypothyroidism    Intermittent atrial fibrillation (H)    Symptomatic menopausal or female climacteric states    Other nonspecific abnormal result of function study of brain and central nervous system    Abnormal glucose    Vaginitis, atrophic    Viral upper respiratory tract infection    Vitamin D deficiency    Osteopenia, unspecified location    Rectal bleeding    Thrombosed hemorrhoids    H/O adenomatous polyp of colon    Sigmoid diverticulosis    Stage 3a chronic kidney disease (H)     Past Surgical History:   Procedure Laterality Date    cataract Bilateral     5/2022 - Emigdio    COLONOSCOPY  2005    approximately 2005 in [facility] - was normal.    COLONOSCOPY N/A 06/28/2021    F/U N/A tubular adenoma       Social History     Tobacco Use    Smoking status: Never    Smokeless  tobacco: Never   Substance Use Topics    Alcohol use: No     Family History   Problem Relation Age of Onset    Heart Disease Mother         Heart Disease,D & C at 91/Heart Disease,pacemaker    Hypertension Mother         Hypertension    Other - See Comments Father          at 76.  He had peripheral vascular disease. Bypass./COPD    Heart Disease Brother         Heart Disease, carotid disease and has gone through stenting    Other - See Comments Brother         tinnitis    Heart Disease Brother         Heart Disease,CABG x 3    Heart Surgery Son         Aortic Valve replacement due to bicuspid aortic valve.    Breast Cancer No family hx of         Cancer-breast         Current Outpatient Medications   Medication Sig Dispense Refill    ARIPiprazole (ABILIFY) 10 MG tablet Take 0.5 tablets (5 mg) by mouth daily 30 tablet 1    Aspirin Buf,CaCarb-MgCarb-MgO, (BUFFERED ASPIRIN) 325 MG TABS Take 1 tablet by mouth daily      levothyroxine (SYNTHROID/LEVOTHROID) 100 MCG tablet Take 1 tablet (100 mcg) by mouth daily 90 tablet 3    metoprolol succinate ER (TOPROL XL) 25 MG 24 hr tablet Take 1 tablet (25 mg) by mouth daily 90 tablet 3    nortriptyline (PAMELOR) 25 MG capsule Take 1 capsule by mouth 2 times daily      RSV vaccine, bivalent, ABRYSVO, injection Inject 0.5 mLs into the muscle once for 1 dose Pharmacist administered 0.5 mL 0    simvastatin (ZOCOR) 10 MG tablet Take 1 tablet (10 mg) by mouth daily 90 tablet 3    Tdap, tetanus-diptheria-acell pertussis, (BOOSTRIX) 5-2.5-18.5 LF-MCG/0.5 CHITO injection Inject 0.5 mLs into the muscle once for 1 dose 0.5 mL 0    zoster vaccine recombinant adjuvanted (SHINGRIX) injection Inject 0.5 mLs into the muscle once for 1 dose Pharmacist administered 0.5 mL 0     No Known Allergies    Current providers sharing in care for this patient include:  Patient Care Team:  Linda Moreno MD as PCP - General (Family Practice)  Linda Moreno MD as Assigned PCP    The  following health maintenance items are reviewed in Epic and correct as of today:  Health Maintenance   Topic Date Due    RSV VACCINE (Pregnancy & 60+) (1 - 1-dose 60+ series) Never done    ZOSTER IMMUNIZATION (3 of 3) 11/01/2019    DTAP/TDAP/TD IMMUNIZATION (2 - Tdap) 06/09/2021    COVID-19 Vaccine (4 - 2023-24 season) 09/01/2023    MEDICARE ANNUAL WELLNESS VISIT  05/16/2024    BMP  05/16/2024    LIPID  05/16/2024    MICROALBUMIN  05/18/2024    TSH W/FREE T4 REFLEX  06/21/2024    HEMOGLOBIN  05/16/2024    INFLUENZA VACCINE (1) 09/01/2024    PHQ-9  02/19/2025    FALL RISK ASSESSMENT  08/19/2025    ADVANCE CARE PLANNING  05/09/2029    DEXA  03/20/2034    DEPRESSION ACTION PLAN  Completed    Pneumococcal Vaccine: 65+ Years  Completed    URINALYSIS  Completed    IPV IMMUNIZATION  Aged Out    HPV IMMUNIZATION  Aged Out    MENINGITIS IMMUNIZATION  Aged Out    RSV MONOCLONAL ANTIBODY  Aged Out       Appropriate preventive services were discussed with this patient, including applicable screening as appropriate for fall prevention, nutrition, physical activity, Tobacco-use cessation, weight loss and cognition.  Checklist reviewing preventive services available has been given to the patient.          8/19/2024   Mini Cog   Clock Draw Score 0 Abnormal   3 Item Recall 1 object recalled   Mini Cog Total Score 1                     8/15/2024   General Health   How would you rate your overall physical health? (!) FAIR   Feel stress (tense, anxious, or unable to sleep) Not at all            8/15/2024   Nutrition   Diet: Regular (no restrictions)            8/15/2024   Exercise   Days per week of moderate/strenous exercise 0 days   Average minutes spent exercising at this level 0 min      (!) EXERCISE CONCERN      8/15/2024   Social Factors   Frequency of gathering with friends or relatives Once a week   Worry food won't last until get money to buy more No   Food not last or not have enough money for food? No   Do you have housing?  (Housing is defined as stable permanent housing and does not include staying ouside in a car, in a tent, in an abandoned building, in an overnight shelter, or couch-surfing.) Yes   Are you worried about losing your housing? No   Lack of transportation? No   Unable to get utilities (heat,electricity)? No            8/19/2024   Fall Risk   Gait Speed Test (Document in seconds) 8   Gait Speed Test Interpretation Greater than 5.01 seconds - ABNORMAL             8/15/2024   Activities of Daily Living- Home Safety   Needs help with the following daily activites Transportation   Safety concerns in the home None of the above            8/15/2024   Dental   Dentist two times every year? Yes            8/15/2024   Hearing Screening   Hearing concerns? (!) IT'S HARD TO FOLLOW A CONVERSATION IN A NOISY RESTAURANT OR CROWDED ROOM.    (!) TROUBLE UNDESTANDING A SPEAKER IN A PUBLIC MEETING OR [employer] SERVICE.    (!) TROUBLE UNDERSTANDING SOFT OR WHISPERED SPEECH.       Multiple values from one day are sorted in reverse-chronological order         8/15/2024   Driving Risk Screening   Patient/family members have concerns about driving (!) DECLINE            8/15/2024   General Alertness/Fatigue Screening   Have you been more tired than usual lately? No            8/15/2024   Urinary Incontinence Screening   Bothered by leaking urine in past 6 months Yes            8/15/2024   TB Screening   Were you born outside of the [place]? No          Today's PHQ-9 Score:       8/19/2024     2:04 PM   PHQ-9 SCORE   PHQ-9 Total Score MyChart 0   PHQ-9 Total Score 0         8/15/2024   Substance Use   Alcohol more than 3/day or more than 7/wk Not Applicable   Do you have a current opioid prescription? No   How severe/bad is pain from 1 to 10? 0/10 (No Pain)   Do you use any other substances recreationally? 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  MRI of brain after head CT showed multiple enhancing lesions, concerning for metastatic illness.  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  Referred to ENT and oncology 5/1/12.    Depression     Hypothyroidism     1993    Other hyperlipidemia     Personal history of other medical treatment (CODE)     G3, P3-0-0-3 status post three NSVDs     Past Surgical History:   Procedure Laterality Date    cataract Bilateral     5/2022 - Emigdio    COLONOSCOPY  2005    approximately 2005 in the [facility] - was normal.    COLONOSCOPY N/A 06/28/2021    F/U N/A tubular adenoma     BP Readings from Last 3 Encounters:   08/19/24 132/76   05/16/23 136/80   10/07/22 130/74    Wt Readings from Last 3 Encounters:   08/19/24 83.9 kg (185 lb)   05/16/23 82.1 kg (181 lb)   10/07/22 81.2 kg (179 lb 1.6 oz)                  Patient Active Problem List   Diagnosis    Abnormal electrocardiogram    Allergic rhinitis    Carpal tunnel syndrome    Retinal vascular occlusion    Cerebral lesion    CKD (chronic kidney disease)    Cerebral artery occlusion with cerebral infarction (H)    Depression    Dysthymia    Hearing loss    Hyperlipidemia    Hypothyroidism    Intermittent atrial fibrillation (H)    Symptomatic menopausal or female climacteric states    Other nonspecific abnormal result of function study of brain and central nervous system    Abnormal glucose    Vaginitis, atrophic    Viral upper respiratory tract infection    Vitamin D deficiency    Osteopenia, unspecified location    Rectal bleeding    Thrombosed  hemorrhoids    H/O adenomatous polyp of colon    Sigmoid diverticulosis    Stage 3a chronic kidney disease (H)     Past Surgical History:   Procedure Laterality Date    cataract Bilateral     2022 - Emigdio    COLONOSCOPY      approximately  in the [facility] - was normal.    COLONOSCOPY N/A 2021    F/U N/A tubular adenoma       Social History     Tobacco Use    Smoking status: Never    Smokeless tobacco: Never   Substance Use Topics    Alcohol use: No     Family History   Problem Relation Age of Onset    Heart Disease Mother         Heart Disease,D & C at 91/Heart Disease,pacemaker    Hypertension Mother         Hypertension    Other - See Comments Father          at 76.  He had peripheral vascular disease. Bypass./COPD    Heart Disease Brother         Heart Disease, carotid disease and has gone through stenting    Other - See Comments Brother         tinnitis    Heart Disease Brother         Heart Disease,CABG x 3    Heart Surgery Son         Aortic Valve replacement due to bicuspid aortic valve.    Breast Cancer No family hx of         Cancer-breast         Current Outpatient Medications   Medication Sig Dispense Refill    ARIPiprazole (ABILIFY) 10 MG tablet Take 0.5 tablets (5 mg) by mouth daily 30 tablet 1    Aspirin Buf,CaCarb-MgCarb-MgO, (BUFFERED ASPIRIN) 325 MG TABS Take 1 tablet by mouth daily      levothyroxine (SYNTHROID/LEVOTHROID) 100 MCG tablet Take 1 tablet (100 mcg) by mouth daily 90 tablet 3    metoprolol succinate ER (TOPROL XL) 25 MG 24 hr tablet Take 1 tablet (25 mg) by mouth daily 90 tablet 3    nortriptyline (PAMELOR) 25 MG capsule Take 1 capsule by mouth 2 times daily      RSV vaccine, bivalent, ABRYSVO, injection Inject 0.5 mLs into the muscle once for 1 dose Pharmacist administered 0.5 mL 0    simvastatin (ZOCOR) 10 MG tablet Take 1 tablet (10 mg) by mouth daily 90 tablet 3    Tdap, tetanus-diptheria-acell pertussis, (BOOSTRIX) 5-2.5-18.5 LF-MCG/0.5 CHITO injection  "Inject 0.5 mLs into the muscle once for 1 dose 0.5 mL 0    zoster vaccine recombinant adjuvanted (SHINGRIX) injection Inject 0.5 mLs into the muscle once for 1 dose Pharmacist administered 0.5 mL 0     No Known Allergies  Current providers sharing in care for this patient include:  Patient Care Team:  Linda Moreno MD as PCP - General (Family Practice)  Linda Moreno MD as Assigned PCP    The following health maintenance items are reviewed in Epic and correct as of today:  Health Maintenance   Topic Date Due    RSV VACCINE (Pregnancy & 60+) (1 - 1-dose 60+ series) Never done    ZOSTER IMMUNIZATION (3 of 3) 11/01/2019    DTAP/TDAP/TD IMMUNIZATION (2 - Tdap) 06/09/2021    COVID-19 Vaccine (4 - 2023-24 season) 09/01/2023    MEDICARE ANNUAL WELLNESS VISIT  05/16/2024    BMP  05/16/2024    LIPID  05/16/2024    MICROALBUMIN  05/18/2024    TSH W/FREE T4 REFLEX  06/21/2024    HEMOGLOBIN  05/16/2024    INFLUENZA VACCINE (1) 09/01/2024    PHQ-9  02/19/2025    FALL RISK ASSESSMENT  08/19/2025    ADVANCE CARE PLANNING  05/09/2029    DEXA  03/20/2034    DEPRESSION ACTION PLAN  Completed    Pneumococcal Vaccine: 65+ Years  Completed    URINALYSIS  Completed    IPV IMMUNIZATION  Aged Out    HPV IMMUNIZATION  Aged Out    MENINGITIS IMMUNIZATION  Aged Out    RSV MONOCLONAL ANTIBODY  Aged Out         Review of Systems  Constitutional, HEENT, cardiovascular, pulmonary, GI, , musculoskeletal, neuro, skin, endocrine and psych systems are negative, except as otherwise noted.     Objective    Exam  /76   Pulse 74   Temp 98.6  F (37  C) (Tympanic)   Resp 16   Wt 83.9 kg (185 lb)   LMP  (LMP Unknown)   SpO2 97%   Breastfeeding No   BMI 32.77 kg/m     Estimated body mass index is 32.77 kg/m  as calculated from the following:    Height as of 5/16/23: 1.6 m (5' 3\").    Weight as of this encounter: 83.9 kg (185 lb).    Physical Exam  Constitutional:       General: She is not in acute distress.     " Appearance: She is well-developed.   HENT:      Head: Normocephalic.      Right Ear: Tympanic membrane and external ear normal.      Left Ear: Tympanic membrane and external ear normal.      Nose: Nose normal.      Mouth/Throat:      Mouth: Mucous membranes are moist.      Pharynx: Oropharynx is clear. No oropharyngeal exudate or posterior oropharyngeal erythema.   Eyes:      General:         Right eye: No discharge.         Left eye: No discharge.      Conjunctiva/sclera: Conjunctivae normal.      Pupils: Pupils are equal, round, and reactive to light.   Neck:      Thyroid: No thyromegaly.      Trachea: No tracheal deviation.   Cardiovascular:      Rate and Rhythm: Normal rate and regular rhythm.      Pulses: Normal pulses.      Heart sounds: Normal heart sounds, S1 normal and S2 normal. No murmur heard.     No friction rub. No gallop. No S3 or S4 sounds.   Pulmonary:      Effort: Pulmonary effort is normal. No respiratory distress.      Breath sounds: Normal breath sounds. No wheezing or rales.      Comments: Breast exam:  deferred per patient.  Abdominal:      General: Bowel sounds are normal. There is no distension.      Palpations: Abdomen is soft. There is no mass.      Tenderness: There is no abdominal tenderness.   Genitourinary:     Comments: Pelvic/Rectal exams deferred per patient.  Musculoskeletal:         General: Normal range of motion.      Cervical back: Neck supple.   Lymphadenopathy:      Cervical: No cervical adenopathy.   Skin:     General: Skin is warm and dry.      Findings: No rash.   Neurological:      Mental Status: She is alert and oriented to person, place, and time.      Motor: No abnormal muscle tone.      Deep Tendon Reflexes: Reflexes are normal and symmetric.   Psychiatric:         Mood and Affect: Mood normal.         Thought Content: Thought content normal.         Judgment: Judgment normal.               8/19/2024   Mini Cog   Clock Draw Score 0 Abnormal   3 Item Recall 1 object  recalled   Mini Cog Total Score 1               Answers submitted by the patient for this visit:  Patient Health Questionnaire (Submitted on 8/19/2024)  If you checked off any problems, how difficult have these problems made it for you to do your work, take care of things at home, or get along with other people?: Not difficult at all  PHQ9 TOTAL SCORE: 0        ICD-10-CM    1. Encounter for Medicare annual wellness exam  Z00.00       2. Need for shingles vaccine  Z23 zoster vaccine recombinant adjuvanted (SHINGRIX) injection      3. Need for Tdap vaccination  Z23 Tdap, tetanus-diptheria-acell pertussis, (BOOSTRIX) 5-2.5-18.5 LF-MCG/0.5 CHITO injection      4. Need for vaccination against respiratory syncytial virus  Z29.11 RSV vaccine, bivalent, ABRYSVO, injection      5. Stage 3a chronic kidney disease (H)  N18.31 Albumin Random Urine Quantitative with Creat Ratio     CBC with Platelets & Differential     Comprehensive metabolic panel     Albumin Random Urine Quantitative with Creat Ratio     CBC with Platelets & Differential     Comprehensive metabolic panel      6. Hypothyroidism, unspecified type  E03.9 levothyroxine (SYNTHROID/LEVOTHROID) 100 MCG tablet     TSH with free T4 reflex     TSH with free T4 reflex      7. Intermittent atrial fibrillation (H)  I48.0 metoprolol succinate ER (TOPROL XL) 25 MG 24 hr tablet      8. Hyperlipidemia, unspecified hyperlipidemia type  E78.5 simvastatin (ZOCOR) 10 MG tablet     Lipid Profile     Lipid Profile           Mammogram declined by patient.  DEXA declined.  Colon cancer screening deferred due to age >75.  Pap Smear deferred due to age > 65.  Abdominal aortic aneurysm screening previously completed in 2019 and was normal.  Orders for Shingrix, RSV and Tdap vaccines sent to pharmacy.  Pneumococcal vaccines are up to date.  See #1.  See #1.  See #1.  Labs ordered as above.  Levothyroxine refilled as above.  TSH ordered as above.  Stable.  She has been evaluated by  Cardiology in the past.  She is on metoprolol, aspirin 325 mg daily and also Simvastatin.  Simvastatin refilled as above.  Lipid profile as above.     No follow-ups on file.       Linda Moreno MD

## 2024-08-19 NOTE — NURSING NOTE
Chief Complaint   Patient presents with    Wellness Visit         Medication Reconciliation: complete    Rosa Reese, LPN

## 2024-08-19 NOTE — PATIENT INSTRUCTIONS
Patient Education   Preventive Care Advice   This is general advice given by our system to help you stay healthy. However, your care team may have specific advice just for you. Please talk to your care team about your preventive care needs.  Nutrition  Eat 5 or more servings of fruits and vegetables each day.  Try wheat bread, brown rice and whole grain pasta (instead of white bread, rice, and pasta).  Get enough calcium and vitamin D. Check the label on foods and aim for 100% of the RDA (recommended daily allowance).  Lifestyle  Exercise at least 150 minutes each week  (30 minutes a day, 5 days a week).  Do muscle strengthening activities 2 days a week. These help control your weight and prevent disease.  No smoking.  Wear sunscreen to prevent skin cancer.  Have a dental exam and cleaning every 6 months.  Yearly exams  See your health care team every year to talk about:  Any changes in your health.  Any medicines your care team has prescribed.  Preventive care, family planning, and ways to prevent chronic diseases.  Shots (vaccines)   HPV shots (up to age 26), if you've never had them before.  Hepatitis B shots (up to age 59), if you've never had them before.  COVID-19 shot: Get this shot when it's due.  Flu shot: Get a flu shot every year.  Tetanus shot: Get a tetanus shot every 10 years.  Pneumococcal, hepatitis A, and RSV shots: Ask your care team if you need these based on your risk.  Shingles shot (for age 50 and up)  General health tests  Diabetes screening:  Starting at age 35, Get screened for diabetes at least every 3 years.  If you are younger than age 35, ask your care team if you should be screened for diabetes.  Cholesterol test: At age 39, start having a cholesterol test every 5 years, or more often if advised.  Bone density scan (DEXA): At age 50, ask your care team if you should have this scan for osteoporosis (brittle bones).  Hepatitis C: Get tested at least once in your life.  STIs (sexually  transmitted infections)  Before age 24: Ask your care team if you should be screened for STIs.  After age 24: Get screened for STIs if you're at risk. You are at risk for STIs (including HIV) if:  You are sexually active with more than one person.  You don't use condoms every time.  You or a partner was diagnosed with a sexually transmitted infection.  If you are at risk for HIV, ask about PrEP medicine to prevent HIV.  Get tested for HIV at least once in your life, whether you are at risk for HIV or not.  Cancer screening tests  Cervical cancer screening: If you have a cervix, begin getting regular cervical cancer screening tests starting at age 21.  Breast cancer scan (mammogram): If you've ever had breasts, begin having regular mammograms starting at age 40. This is a scan to check for breast cancer.  Colon cancer screening: It is important to start screening for colon cancer at age 45.  Have a colonoscopy test every 10 years (or more often if you're at risk) Or, ask your provider about stool tests like a FIT test every year or Cologuard test every 3 years.  To learn more about your testing options, visit:   .  For help making a decision, visit:   https://bit.ly/gx33788.  Prostate cancer screening test: If you have a prostate, ask your care team if a prostate cancer screening test (PSA) at age 55 is right for you.  Lung cancer screening: If you are a current or former smoker ages 50 to 80, ask your care team if ongoing lung cancer screenings are right for you.  For informational purposes only. Not to replace the advice of your health care provider. Copyright   2023 [facility]. All rights reserved. Clinically reviewed by the [facility]. People [employer] Pages 764999 - REV 01/24.  Learning About Activities of Daily Living  What are activities of daily living?     Activities of daily living (ADLs) are the basic self-care tasks you do every day. These include eating, bathing, dressing,  and moving around.  As you age, and if you have health problems, you may find that it's harder to do some of these tasks. If so, your doctor can suggest ideas that may help.  To measure what kind of help you may need, your doctor will ask how well you are able to do ADLs. Let your doctor know if there are any tasks that you are having trouble doing. This is an important first step to getting help. And when you have the help you need, you can stay as independent as possible.  How will a doctor assess your ADLs?  Asking about ADLs is part of a routine health checkup your doctor will likely do as you age. Your health check might be done in a doctor's office, in your home, or at a hospital. The goal is to find out if you are having any problems that could make it hard to care for yourself or that make it unsafe for you to be on your own.  To measure your ADLs, your doctor will ask how hard it is for you to do routine tasks. Your doctor may also want to know if you have changed the way you do a task because of a health problem. Your doctor may watch how you:  Walk back and forth.  Keep your balance while you stand or walk.  Move from sitting to standing or from a bed to a chair.  Button or unbutton a shirt or sweater.  Remove and put on your shoes.  It's common to feel a little worried or anxious if you find you can't do all the things you used to be able to do. Talking with your doctor about ADLs is a way to make sure you're as safe as possible and able to care for yourself as well as you can. You may want to bring a caregiver, friend, or family member to your checkup. They can help you talk to your doctor.  Follow-up care is a key part of your treatment and safety. Be sure to make and go to all appointments, and call your doctor if you are having problems. It's also a good idea to know your test results and keep a list of the medicines you take.  Current as of: October 24, 2023  Content Version: 14.1    7871-7572  Healthwise, [employer].   Care instructions adapted under license by your healthcare professional. If you have questions about a medical condition or this instruction, always ask your healthcare professional. [employer] disclaims any warranty or liability for your use of this information.    Preventing Falls: Care Instructions  Injuries and health problems such as trouble walking or poor eyesight can increase your risk of falling. So can some medicines. But there are things you can do to help prevent falls. You can exercise to get stronger. You can also arrange your home to make it safer.    Talk to your doctor about the medicines you take. Ask if any of them increase the risk of falls and whether they can be changed or stopped.   Try to exercise regularly. It can help improve your strength and balance. This can help lower your risk of falling.     Practice fall safety and prevention.    Wear low-heeled shoes that fit well and give your feet good support. Talk to your doctor if you have foot problems that make this hard.  Carry a cellphone or wear a medical alert device that you can use to call for help.  Use stepladders instead of chairs to reach high objects. Don't climb if you're at risk for falls. Ask for help, if needed.  Wear the correct eyeglasses, if you need them.    Make your home safer.    Remove rugs, cords, clutter, and furniture from walkways.  Keep your house well lit. Use night-lights in hallways and bathrooms.  Install and use sturdy handrails on stairways.  Wear nonskid footwear, even inside. Don't walk barefoot or in socks without shoes.    Be safe outside.    Use handrails, curb cuts, and ramps whenever possible.  Keep your hands free by using a shoulder bag or backpack.  Try to walk in well-lit areas. Watch out for uneven ground, changes in pavement, and debris.  Be careful in the winter. Walk on the grass or gravel when sidewalks are slippery. Use de-icer on steps and walkways.  "Add non-slip devices to shoes.    Put grab bars and nonskid mats in your shower or tub and near the toilet. Try to use a shower chair or bath bench when bathing.   Get into a tub or shower by putting in your weaker leg first. Get out with your strong side first. Have a phone or medical alert device in the bathroom with you.   Where can you learn more?  Go to https://www.SmartVault.kinkon/patiented  Enter G117 in the search box to learn more about \"Preventing Falls: Care Instructions.\"  Current as of: July 17, 2023               Content Version: 14.0    4351-5460 [employer].   Care instructions adapted under license by your healthcare professional. If you have questions about a medical condition or this instruction, always ask your healthcare professional. [employer] disclaims any warranty or liability for your use of this information.      Hearing Loss: Care Instructions  Overview     Hearing loss is a sudden or slow decrease in how well you hear. It can range from slight to profound. Permanent hearing loss can occur with aging. It also can happen when you are exposed long-term to loud noise. Examples include listening to loud music, riding motorcycles, or being around other loud machines.  Hearing loss can affect your work and home life. It can make you feel lonely or depressed. You may feel that you have lost your independence. But hearing aids and other devices can help you hear better and feel connected to others.  Follow-up care is a key part of your treatment and safety. Be sure to make and go to all appointments, and call your doctor if you are having problems. It's also a good idea to know your test results and keep a list of the medicines you take.  How can you care for yourself at home?  Avoid loud noises whenever possible. This helps keep your hearing from getting worse.  Always wear hearing protection around loud noises.  Wear a hearing aid as directed.  A professional can help " "you pick a hearing aid that will work best for you.  You can also get hearing aids over the counter for mild to moderate hearing loss.  Have hearing tests as your doctor suggests. They can show whether your hearing has changed. Your hearing aid may need to be adjusted.  Use other devices as needed. These may include:  Telephone amplifiers and hearing aids that can connect to a television, stereo, radio, or microphone.  Devices that use lights or vibrations. These alert you to the doorbell, a ringing telephone, or a baby monitor.  Television closed-captioning. This shows the words at the bottom of the screen. Most new TVs can do this.  TTY (text telephone). This lets you type messages back and forth on the telephone instead of talking or listening. These devices are also called TDD. When messages are typed on the keyboard, they are sent over the phone line to a receiving TTY. The message is shown on a monitor.  Use text messaging, social media, and email if it is hard for you to communicate by telephone.  Try to learn a listening technique called speechreading. It is not lipreading. You pay attention to people's gestures, expressions, posture, and tone of voice. These clues can help you understand what a person is saying. Face the person you are talking to, and have them face you. Make sure the lighting is good. You need to see the other person's face clearly.  Think about counseling if you need help to adjust to your hearing loss.  When should you call for help?  Watch closely for changes in your health, and be sure to contact your doctor if:    You think your hearing is getting worse.     You have new symptoms, such as dizziness or nausea.   Where can you learn more?  Go to https://www.nWay.net/patiented  Enter R798 in the search box to learn more about \"Hearing Loss: Care Instructions.\"  Current as of: September 27, 2023               Content Version: 14.0    6842-7078 Healthwise, [employer].   Care " instructions adapted under license by your healthcare professional. If you have questions about a medical condition or this instruction, always ask your healthcare professional. [employer] disclaims any warranty or liability for your use of this information.      Bladder Training: Care Instructions  Your Care Instructions     Bladder training is used to treat urge incontinence and stress incontinence. Urge incontinence means that the need to urinate comes on so fast that you can't get to a toilet in time. Stress incontinence means that you leak urine because of pressure on your bladder. For example, it may happen when you laugh, cough, or lift something heavy.  Bladder training can increase how long you can wait before you have to urinate. It can also help your bladder hold more urine. And it can give you better control over the urge to urinate.  It is important to remember that bladder training takes a few weeks to a few months to make a difference. You may not see results right away, but don't give up.  Follow-up care is a key part of your treatment and safety. Be sure to make and go to all appointments, and call your doctor if you are having problems. It's also a good idea to know your test results and keep a list of the medicines you take.  How can you care for yourself at home?  Work with your doctor to come up with a bladder training program that is right for you. You may use one or more of the following methods.  Delayed urination  In the beginning, try to keep from urinating for 5 minutes after you first feel the need to go.  While you wait, take deep, slow breaths to relax. Kegel exercises can also help you delay the need to go to the bathroom.  After some practice, when you can easily wait 5 minutes to urinate, try to wait 10 minutes before you urinate.  Slowly increase the waiting period until you are able to control when you have to urinate.  Scheduled urination  Empty your bladder when you  "first wake up in the morning.  Schedule times throughout the day when you will urinate.  Start by going to the bathroom every hour, even if you don't need to go.  Slowly increase the time between trips to the bathroom.  When you have found a schedule that works well for you, keep doing it.  If you wake up during the night and have to urinate, do it. Apply your schedule to waking hours only.  Kegel exercises  These tighten and strengthen pelvic muscles, which can help you control the flow of urine. (If doing these exercises causes pain, stop doing them and talk with your doctor.) To do Kegel exercises:  Squeeze your muscles as if you were trying not to pass gas. Or squeeze your muscles as if you were stopping the flow of urine. Your belly, legs, and buttocks shouldn't move.  Hold the squeeze for 3 seconds, then relax for 5 to 10 seconds.  Start with 3 seconds, then add 1 second each week until you are able to squeeze for 10 seconds.  Repeat the exercise 10 times a session. Do 3 to 8 sessions a day.  When should you call for help?  Watch closely for changes in your health, and be sure to contact your doctor if:    Your incontinence is getting worse.     You do not get better as expected.   Where can you learn more?  Go to https://www.LinkCloud.net/patiented  Enter V684 in the search box to learn more about \"Bladder Training: Care Instructions.\"  Current as of: November 15, 2023               Content Version: 14.0    6233-6711 [employer].   Care instructions adapted under license by your healthcare professional. If you have questions about a medical condition or this instruction, always ask your healthcare professional. [employer] disclaims any warranty or liability for your use of this information.         "

## 2024-09-13 ENCOUNTER — MYC MEDICAL ADVICE (OUTPATIENT)
Dept: FAMILY MEDICINE | Facility: OTHER | Age: 84
End: 2024-09-13
Payer: COMMERCIAL

## 2024-09-13 DIAGNOSIS — E03.9 HYPOTHYROIDISM, UNSPECIFIED TYPE: Primary | ICD-10-CM

## 2024-09-16 RX ORDER — LEVOTHYROXINE SODIUM 112 UG/1
112 TABLET ORAL DAILY
Qty: 90 TABLET | Refills: 4 | Status: SHIPPED | OUTPATIENT
Start: 2024-09-16

## 2024-09-16 NOTE — TELEPHONE ENCOUNTER
Increased Levothyroxine to 112 mcg daily.  Will need repeat TSH in 4-6 weeks.  Order placed.  Linda Moreno MD

## 2024-09-16 NOTE — TELEPHONE ENCOUNTER
Per Lab comments from 8/19/24:    Arden Torres and Trina -the TSH was slightly elevated.  If some doses of levothyroxine have been missed recently, this could cause this elevation.  If this is the case, I would recommend taking it regularly on a daily basis over the next month and then consider having her TSH rechecked at that time.  If she has been taking the levothyroxine every day regularly, I would recommend a small dose increase in the levothyroxine and then recheck the TSH again in 4 to 6 weeks.  Please let me know which scenario fits and I can place orders as needed.     Currently prescribed Levothyroxine 100 mcg. Navarro'd up order for Levothyroxine 112 mcg.     Routing to provider to review and respond.  Janet Glover RN on 9/16/2024 at 3:00 PM

## 2024-10-30 ENCOUNTER — LAB (OUTPATIENT)
Dept: LAB | Facility: OTHER | Age: 84
End: 2024-10-30
Payer: MEDICARE

## 2024-10-30 DIAGNOSIS — E03.9 HYPOTHYROIDISM, UNSPECIFIED TYPE: ICD-10-CM

## 2024-10-30 DIAGNOSIS — Z79.899 NEED FOR PROPHYLACTIC CHEMOTHERAPY: ICD-10-CM

## 2024-10-30 LAB
BASOPHILS # BLD AUTO: 0.1 10E3/UL (ref 0–0.2)
BASOPHILS NFR BLD AUTO: 1 %
CHOLEST SERPL-MCNC: 153 MG/DL
EOSINOPHIL # BLD AUTO: 0.3 10E3/UL (ref 0–0.7)
EOSINOPHIL NFR BLD AUTO: 4 %
ERYTHROCYTE [DISTWIDTH] IN BLOOD BY AUTOMATED COUNT: 15.3 % (ref 10–15)
EST. AVERAGE GLUCOSE BLD GHB EST-MCNC: 123 MG/DL
FASTING STATUS PATIENT QL REPORTED: NORMAL
HBA1C MFR BLD: 5.9 %
HCT VFR BLD AUTO: 43.1 % (ref 35–47)
HDLC SERPL-MCNC: 59 MG/DL
HGB BLD-MCNC: 13.3 G/DL (ref 11.7–15.7)
IMM GRANULOCYTES # BLD: 0 10E3/UL
IMM GRANULOCYTES NFR BLD: 1 %
LDLC SERPL CALC-MCNC: 74 MG/DL
LYMPHOCYTES # BLD AUTO: 3.1 10E3/UL (ref 0.8–5.3)
LYMPHOCYTES NFR BLD AUTO: 36 %
MCH RBC QN AUTO: 26.7 PG (ref 26.5–33)
MCHC RBC AUTO-ENTMCNC: 30.9 G/DL (ref 31.5–36.5)
MCV RBC AUTO: 86 FL (ref 78–100)
MONOCYTES # BLD AUTO: 0.7 10E3/UL (ref 0–1.3)
MONOCYTES NFR BLD AUTO: 9 %
NEUTROPHILS # BLD AUTO: 4.3 10E3/UL (ref 1.6–8.3)
NEUTROPHILS NFR BLD AUTO: 50 %
NONHDLC SERPL-MCNC: 94 MG/DL
NRBC # BLD AUTO: 0 10E3/UL
NRBC BLD AUTO-RTO: 0 /100
PLATELET # BLD AUTO: 297 10E3/UL (ref 150–450)
RBC # BLD AUTO: 4.99 10E6/UL (ref 3.8–5.2)
T4 FREE SERPL-MCNC: 1.12 NG/DL (ref 0.9–1.7)
TRIGL SERPL-MCNC: 99 MG/DL
TSH SERPL DL<=0.005 MIU/L-ACNC: 6.05 UIU/ML (ref 0.3–4.2)
WBC # BLD AUTO: 8.5 10E3/UL (ref 4–11)

## 2024-10-30 PROCEDURE — 84439 ASSAY OF FREE THYROXINE: CPT | Mod: ZL

## 2024-10-30 PROCEDURE — 80335 ANTIDEPRESSANT TRICYCLIC 1/2: CPT | Mod: ZL

## 2024-10-30 PROCEDURE — 84481 FREE ASSAY (FT-3): CPT | Mod: ZL

## 2024-10-30 PROCEDURE — 36415 COLL VENOUS BLD VENIPUNCTURE: CPT | Mod: ZL

## 2024-10-30 PROCEDURE — 84443 ASSAY THYROID STIM HORMONE: CPT | Mod: ZL

## 2024-10-30 PROCEDURE — 82465 ASSAY BLD/SERUM CHOLESTEROL: CPT | Mod: ZL

## 2024-10-30 PROCEDURE — 83036 HEMOGLOBIN GLYCOSYLATED A1C: CPT | Mod: ZL

## 2024-10-30 PROCEDURE — 85004 AUTOMATED DIFF WBC COUNT: CPT | Mod: ZL

## 2024-10-31 DIAGNOSIS — E03.9 HYPOTHYROIDISM, UNSPECIFIED TYPE: ICD-10-CM

## 2024-10-31 RX ORDER — LEVOTHYROXINE SODIUM 125 UG/1
125 TABLET ORAL DAILY
Qty: 90 TABLET | Refills: 3 | Status: SHIPPED | OUTPATIENT
Start: 2024-10-31

## 2024-11-01 LAB — T3FREE SERPL-MCNC: 2.7 PG/ML (ref 2–4.4)

## 2024-11-09 LAB — NORTRIP SERPL-MCNC: 103 NG/ML

## 2024-12-18 ENCOUNTER — LAB (OUTPATIENT)
Dept: LAB | Facility: OTHER | Age: 84
End: 2024-12-18
Payer: MEDICARE

## 2024-12-18 DIAGNOSIS — E03.9 HYPOTHYROIDISM, UNSPECIFIED TYPE: ICD-10-CM

## 2024-12-18 LAB — TSH SERPL DL<=0.005 MIU/L-ACNC: 3.4 UIU/ML (ref 0.3–4.2)

## 2024-12-18 PROCEDURE — 84443 ASSAY THYROID STIM HORMONE: CPT | Mod: ZL

## 2024-12-18 PROCEDURE — 36415 COLL VENOUS BLD VENIPUNCTURE: CPT | Mod: ZL

## 2024-12-19 ENCOUNTER — ALLIED HEALTH/NURSE VISIT (OUTPATIENT)
Dept: FAMILY MEDICINE | Facility: OTHER | Age: 84
End: 2024-12-19
Attending: FAMILY MEDICINE
Payer: MEDICARE

## 2024-12-19 DIAGNOSIS — Z23 ENCOUNTER FOR IMMUNIZATION: Primary | ICD-10-CM

## 2024-12-19 PROCEDURE — G0008 ADMIN INFLUENZA VIRUS VAC: HCPCS

## 2024-12-19 PROCEDURE — 90662 IIV NO PRSV INCREASED AG IM: CPT

## 2024-12-19 NOTE — PROGRESS NOTES
Prior to immunization administration, verified patients identity using patient s name and date of birth. Please see Immunization Activity for additional information.     Is the patient's temperature normal (100.5 or less)? Yes     Patient MEETS CRITERIA. PROCEED with vaccine administration.      Patient instructed to remain in clinic for 15 minutes afterwards, and to report any adverse reactions.      Link to Ancillary Visit Immunization Standing Orders SmartSet     Screening performed by Haroldo Huynh RN on 12/19/2024 at 1:55 PM.

## 2025-08-21 DIAGNOSIS — I48.0 INTERMITTENT ATRIAL FIBRILLATION (H): ICD-10-CM

## 2025-08-25 RX ORDER — METOPROLOL SUCCINATE 25 MG/1
25 TABLET, EXTENDED RELEASE ORAL DAILY
Qty: 90 TABLET | Refills: 0 | Status: SHIPPED | OUTPATIENT
Start: 2025-08-25

## (undated) DEVICE — SOL WATER 1500ML

## (undated) DEVICE — SUCTION MANIFOLD NEPTUNE 2 SYS 4 PORT 0702-020-000

## (undated) DEVICE — ENDO BRUSH CHANNEL MASTER CLEANING 2-4.2MM BW-412T

## (undated) DEVICE — ESU GROUND PAD ADULT W/CORD E7507

## (undated) DEVICE — ENDO TRAP POLYP E-TRAP 00711099

## (undated) DEVICE — ENDO KIT COMPLIANCE DYKENDOCMPLY

## (undated) DEVICE — ESU ENDO FORCEP BX HOT FD-210U

## (undated) DEVICE — ENDO SNARE POLYPECTOMY OVAL 25MM LOOP SD-240U-25

## (undated) DEVICE — TUBING SUCTION 10'X3/16" N510

## (undated) RX ORDER — MAGNESIUM OXIDE 400 MG/1
TABLET ORAL
Status: DISPENSED
Start: 2021-11-09

## (undated) RX ORDER — ENALAPRILAT 1.25 MG/ML
INJECTION INTRAVENOUS
Status: DISPENSED
Start: 2021-11-09